# Patient Record
Sex: FEMALE | Race: WHITE | Employment: OTHER | URBAN - METROPOLITAN AREA
[De-identification: names, ages, dates, MRNs, and addresses within clinical notes are randomized per-mention and may not be internally consistent; named-entity substitution may affect disease eponyms.]

---

## 2017-02-21 ENCOUNTER — OFFICE VISIT (OUTPATIENT)
Dept: FAMILY MEDICINE CLINIC | Age: 62
End: 2017-02-21

## 2017-02-21 VITALS
BODY MASS INDEX: 24.29 KG/M2 | OXYGEN SATURATION: 99 % | RESPIRATION RATE: 19 BRPM | HEIGHT: 63 IN | DIASTOLIC BLOOD PRESSURE: 68 MMHG | WEIGHT: 137.1 LBS | SYSTOLIC BLOOD PRESSURE: 114 MMHG | HEART RATE: 79 BPM | TEMPERATURE: 99.1 F

## 2017-02-21 DIAGNOSIS — J20.9 ACUTE BRONCHITIS, UNSPECIFIED ORGANISM: Primary | ICD-10-CM

## 2017-02-21 DIAGNOSIS — J01.80 OTHER ACUTE SINUSITIS: ICD-10-CM

## 2017-02-21 LAB
INFLUENZA A ANTIBODY: NORMAL
INFLUENZA B ANTIBODY: NORMAL

## 2017-02-21 PROCEDURE — 99213 OFFICE O/P EST LOW 20 MIN: CPT | Performed by: FAMILY MEDICINE

## 2017-02-21 PROCEDURE — 87804 INFLUENZA ASSAY W/OPTIC: CPT | Performed by: FAMILY MEDICINE

## 2017-02-21 RX ORDER — DEXTROMETHORPHAN HYDROBROMIDE AND PROMETHAZINE HYDROCHLORIDE 15; 6.25 MG/5ML; MG/5ML
5 SYRUP ORAL 4 TIMES DAILY PRN
Qty: 150 BOTTLE | Refills: 0 | Status: SHIPPED | OUTPATIENT
Start: 2017-02-21 | End: 2017-02-28

## 2017-02-21 RX ORDER — CEFUROXIME AXETIL 250 MG/1
250 TABLET ORAL 2 TIMES DAILY
Qty: 20 TABLET | Refills: 0 | Status: SHIPPED | OUTPATIENT
Start: 2017-02-21 | End: 2017-03-03

## 2017-02-21 ASSESSMENT — ENCOUNTER SYMPTOMS
SHORTNESS OF BREATH: 0
ABDOMINAL PAIN: 0

## 2017-03-07 ENCOUNTER — OFFICE VISIT (OUTPATIENT)
Dept: FAMILY MEDICINE CLINIC | Age: 62
End: 2017-03-07

## 2017-03-07 VITALS
SYSTOLIC BLOOD PRESSURE: 128 MMHG | DIASTOLIC BLOOD PRESSURE: 64 MMHG | TEMPERATURE: 98.8 F | BODY MASS INDEX: 24.27 KG/M2 | OXYGEN SATURATION: 99 % | HEART RATE: 79 BPM | WEIGHT: 137 LBS | HEIGHT: 63 IN | RESPIRATION RATE: 19 BRPM

## 2017-03-07 DIAGNOSIS — J32.9 RECURRENT SINUSITIS: Primary | ICD-10-CM

## 2017-03-07 DIAGNOSIS — J30.9 ALLERGIC RHINITIS, UNSPECIFIED ALLERGIC RHINITIS TRIGGER, UNSPECIFIED RHINITIS SEASONALITY: ICD-10-CM

## 2017-03-07 PROCEDURE — 99213 OFFICE O/P EST LOW 20 MIN: CPT | Performed by: FAMILY MEDICINE

## 2017-03-07 RX ORDER — AMOXICILLIN AND CLAVULANATE POTASSIUM 875; 125 MG/1; MG/1
1 TABLET, FILM COATED ORAL 2 TIMES DAILY
Qty: 20 TABLET | Refills: 0 | Status: SHIPPED | OUTPATIENT
Start: 2017-03-07 | End: 2017-03-17

## 2017-03-07 ASSESSMENT — ENCOUNTER SYMPTOMS
ABDOMINAL PAIN: 0
SHORTNESS OF BREATH: 0

## 2017-04-05 ENCOUNTER — OFFICE VISIT (OUTPATIENT)
Dept: OBGYN | Age: 62
End: 2017-04-05

## 2017-04-05 VITALS
DIASTOLIC BLOOD PRESSURE: 80 MMHG | WEIGHT: 137 LBS | SYSTOLIC BLOOD PRESSURE: 108 MMHG | BODY MASS INDEX: 24.27 KG/M2 | HEIGHT: 63 IN

## 2017-04-05 DIAGNOSIS — N81.6 RECTOCELE: Primary | ICD-10-CM

## 2017-04-05 PROCEDURE — 99203 OFFICE O/P NEW LOW 30 MIN: CPT | Performed by: OBSTETRICS & GYNECOLOGY

## 2017-04-25 ENCOUNTER — OFFICE VISIT (OUTPATIENT)
Dept: FAMILY MEDICINE CLINIC | Age: 62
End: 2017-04-25

## 2017-04-25 VITALS
HEART RATE: 69 BPM | TEMPERATURE: 97.1 F | SYSTOLIC BLOOD PRESSURE: 118 MMHG | OXYGEN SATURATION: 99 % | WEIGHT: 137.8 LBS | BODY MASS INDEX: 24.41 KG/M2 | HEIGHT: 63 IN | RESPIRATION RATE: 14 BRPM | DIASTOLIC BLOOD PRESSURE: 68 MMHG

## 2017-04-25 DIAGNOSIS — R09.81 SINUS CONGESTION: ICD-10-CM

## 2017-04-25 DIAGNOSIS — R05.9 COUGH: ICD-10-CM

## 2017-04-25 DIAGNOSIS — R09.82 PND (POST-NASAL DRIP): ICD-10-CM

## 2017-04-25 DIAGNOSIS — J01.00 ACUTE NON-RECURRENT MAXILLARY SINUSITIS: Primary | ICD-10-CM

## 2017-04-25 PROCEDURE — 99213 OFFICE O/P EST LOW 20 MIN: CPT | Performed by: PHYSICIAN ASSISTANT

## 2017-04-25 RX ORDER — MOMETASONE FUROATE 50 UG/1
2 SPRAY, METERED NASAL DAILY
Status: ON HOLD | COMMUNITY
End: 2021-03-03

## 2017-04-25 RX ORDER — AMOXICILLIN AND CLAVULANATE POTASSIUM 875; 125 MG/1; MG/1
1 TABLET, FILM COATED ORAL EVERY 12 HOURS
Qty: 20 TABLET | Refills: 0 | Status: SHIPPED | OUTPATIENT
Start: 2017-04-25 | End: 2017-05-05

## 2017-04-25 ASSESSMENT — ENCOUNTER SYMPTOMS
CHEST TIGHTNESS: 0
SHORTNESS OF BREATH: 0
RHINORRHEA: 1
SINUS COMPLAINT: 1
COUGH: 1
SINUS PRESSURE: 1
COLOR CHANGE: 0

## 2017-05-10 ENCOUNTER — EMPLOYEE WELLNESS (OUTPATIENT)
Dept: OTHER | Age: 62
End: 2017-05-10

## 2017-05-10 LAB
CHOLESTEROL, TOTAL: 232 MG/DL (ref 0–199)
GLUCOSE BLD-MCNC: 78 MG/DL (ref 74–109)
HDLC SERPL-MCNC: 77 MG/DL (ref 40–59)
LDL CHOLESTEROL CALCULATED: 141 MG/DL (ref 0–129)
TRIGL SERPL-MCNC: 68 MG/DL (ref 0–200)

## 2017-06-27 ENCOUNTER — OFFICE VISIT (OUTPATIENT)
Dept: FAMILY MEDICINE CLINIC | Age: 62
End: 2017-06-27

## 2017-06-27 VITALS
WEIGHT: 138 LBS | HEART RATE: 50 BPM | DIASTOLIC BLOOD PRESSURE: 60 MMHG | BODY MASS INDEX: 24.45 KG/M2 | OXYGEN SATURATION: 98 % | TEMPERATURE: 97.2 F | SYSTOLIC BLOOD PRESSURE: 112 MMHG | RESPIRATION RATE: 12 BRPM | HEIGHT: 63 IN

## 2017-06-27 DIAGNOSIS — J45.20 MILD INTERMITTENT ASTHMA WITHOUT COMPLICATION: ICD-10-CM

## 2017-06-27 DIAGNOSIS — M77.42 METATARSALGIA OF LEFT FOOT: Primary | ICD-10-CM

## 2017-06-27 PROCEDURE — 99213 OFFICE O/P EST LOW 20 MIN: CPT | Performed by: FAMILY MEDICINE

## 2017-06-27 RX ORDER — ALBUTEROL SULFATE 90 UG/1
2 AEROSOL, METERED RESPIRATORY (INHALATION) EVERY 6 HOURS PRN
Qty: 1 INHALER | Refills: 3 | Status: SHIPPED | OUTPATIENT
Start: 2017-06-27 | End: 2019-06-10 | Stop reason: SDUPTHER

## 2017-06-27 ASSESSMENT — ENCOUNTER SYMPTOMS
RESPIRATORY NEGATIVE: 1
VOMITING: 0
SORE THROAT: 0
CHANGE IN BOWEL HABIT: 0
ABDOMINAL PAIN: 0
GASTROINTESTINAL NEGATIVE: 1
COUGH: 0
SWOLLEN GLANDS: 0
NAUSEA: 0
VISUAL CHANGE: 0

## 2017-07-05 ENCOUNTER — OFFICE VISIT (OUTPATIENT)
Dept: PODIATRY | Age: 62
End: 2017-07-05

## 2017-07-05 VITALS
HEIGHT: 63 IN | RESPIRATION RATE: 16 BRPM | BODY MASS INDEX: 24.8 KG/M2 | OXYGEN SATURATION: 99 % | DIASTOLIC BLOOD PRESSURE: 58 MMHG | WEIGHT: 140 LBS | SYSTOLIC BLOOD PRESSURE: 104 MMHG | HEART RATE: 58 BPM

## 2017-07-05 DIAGNOSIS — M77.40 METATARSALGIA, UNSPECIFIED LATERALITY: Primary | ICD-10-CM

## 2017-07-05 PROCEDURE — 99202 OFFICE O/P NEW SF 15 MIN: CPT | Performed by: PODIATRIST

## 2017-07-06 ENCOUNTER — OFFICE VISIT (OUTPATIENT)
Dept: OBGYN | Age: 62
End: 2017-07-06

## 2017-07-06 VITALS
BODY MASS INDEX: 24.27 KG/M2 | DIASTOLIC BLOOD PRESSURE: 86 MMHG | HEIGHT: 63 IN | SYSTOLIC BLOOD PRESSURE: 120 MMHG | WEIGHT: 137 LBS

## 2017-07-06 DIAGNOSIS — Z46.89 PESSARY MAINTENANCE: ICD-10-CM

## 2017-07-06 DIAGNOSIS — N81.6 RECTOCELE: Primary | ICD-10-CM

## 2017-07-06 PROCEDURE — 99213 OFFICE O/P EST LOW 20 MIN: CPT | Performed by: OBSTETRICS & GYNECOLOGY

## 2017-09-11 RX ORDER — FLUCONAZOLE 150 MG/1
TABLET ORAL
Qty: 3 TABLET | Refills: 1 | Status: SHIPPED | OUTPATIENT
Start: 2017-09-11 | End: 2017-11-07 | Stop reason: ALTCHOICE

## 2017-11-07 ENCOUNTER — OFFICE VISIT (OUTPATIENT)
Dept: FAMILY MEDICINE CLINIC | Age: 62
End: 2017-11-07

## 2017-11-07 ENCOUNTER — TELEPHONE (OUTPATIENT)
Dept: FAMILY MEDICINE CLINIC | Age: 62
End: 2017-11-07

## 2017-11-07 VITALS
TEMPERATURE: 98.7 F | DIASTOLIC BLOOD PRESSURE: 68 MMHG | HEIGHT: 63 IN | OXYGEN SATURATION: 98 % | BODY MASS INDEX: 25.16 KG/M2 | WEIGHT: 142 LBS | RESPIRATION RATE: 14 BRPM | SYSTOLIC BLOOD PRESSURE: 110 MMHG | HEART RATE: 76 BPM

## 2017-11-07 DIAGNOSIS — J01.90 ACUTE BACTERIAL SINUSITIS: ICD-10-CM

## 2017-11-07 DIAGNOSIS — B96.89 ACUTE BACTERIAL SINUSITIS: ICD-10-CM

## 2017-11-07 DIAGNOSIS — J40 BRONCHITIS: Primary | ICD-10-CM

## 2017-11-07 RX ORDER — PROMETHAZINE HYDROCHLORIDE AND CODEINE PHOSPHATE 6.25; 1 MG/5ML; MG/5ML
5 SYRUP ORAL EVERY 4 HOURS PRN
Qty: 180 ML | Refills: 0 | Status: SHIPPED | OUTPATIENT
Start: 2017-11-07 | End: 2019-06-10 | Stop reason: SDUPTHER

## 2017-11-07 RX ORDER — AMOXICILLIN AND CLAVULANATE POTASSIUM 875; 125 MG/1; MG/1
1 TABLET, FILM COATED ORAL 2 TIMES DAILY
Qty: 20 TABLET | Refills: 0 | Status: SHIPPED | OUTPATIENT
Start: 2017-11-07 | End: 2018-03-26 | Stop reason: SDUPTHER

## 2017-11-07 RX ORDER — BENZONATATE 100 MG/1
CAPSULE ORAL
Refills: 0 | COMMUNITY
Start: 2017-11-05 | End: 2017-11-07 | Stop reason: SDUPTHER

## 2017-11-07 RX ORDER — DOXYCYCLINE HYCLATE 100 MG/1
100 CAPSULE ORAL
COMMUNITY
Start: 2017-11-05 | End: 2017-11-13

## 2017-11-07 RX ORDER — BENZONATATE 100 MG/1
CAPSULE ORAL
Qty: 40 CAPSULE | Refills: 0 | Status: SHIPPED | OUTPATIENT
Start: 2017-11-07 | End: 2018-03-26 | Stop reason: ALTCHOICE

## 2017-11-07 RX ORDER — PREDNISONE 10 MG/1
TABLET ORAL
Qty: 30 TABLET | Refills: 0 | Status: SHIPPED | OUTPATIENT
Start: 2017-11-07 | End: 2018-03-26 | Stop reason: ALTCHOICE

## 2017-11-07 RX ORDER — BENZONATATE 100 MG/1
CAPSULE ORAL
COMMUNITY
Start: 2017-11-05 | End: 2017-11-07 | Stop reason: SDUPTHER

## 2017-11-07 ASSESSMENT — PATIENT HEALTH QUESTIONNAIRE - PHQ9
2. FEELING DOWN, DEPRESSED OR HOPELESS: 0
SUM OF ALL RESPONSES TO PHQ9 QUESTIONS 1 & 2: 0
SUM OF ALL RESPONSES TO PHQ QUESTIONS 1-9: 0
1. LITTLE INTEREST OR PLEASURE IN DOING THINGS: 0

## 2017-11-07 NOTE — TELEPHONE ENCOUNTER
States she was seen for a sinus infection at [address] [facility], was given antibiotics, she states shes still coughing and having problems with congestion. She states she really needs to see you because she has taking so many meds and nothing is still working. I let her know you are booked for the rest of the week and she states she does not want to see no once but you.  Is there anyway she can be squeezed in somewhere she wants to know

## 2017-11-12 ASSESSMENT — ENCOUNTER SYMPTOMS
SNORING: 0
NAUSEA: 0
HOARSE VOICE: 0
COUGH: 1
RHINORRHEA: 0
SORE THROAT: 0
SHORTNESS OF BREATH: 0
SWOLLEN GLANDS: 1
VOMITING: 0
WHEEZING: 1

## 2017-11-12 NOTE — PROGRESS NOTES
Subjective  Lara Bigness, 65 y.o. female presents today with:  Chief Complaint   Patient presents with    Check-Up     sinus infection       The history is provided by the patient. Sinusitis   Pain details:     Location:  Frontal and maxillary    Quality:  Pressure    Severity:  Moderate    Duration:  2 weeks    Timing:  Constant  Progression:  Waxing and waning  Context: not allergies, not chemical odor, not deviated nasal septum, not recent URI and not smoke inhalation    Relieved by:  Nothing  Worsened by:  Nothing  Ineffective treatments:  Antibiotics and oral decongestants  Associated symptoms: congestion, cough, ear pain, headaches, swollen glands and wheezing    Associated symptoms: no chest pain, no chills, no fatigue, no fever, no hoarse voice, no mouth breathing, no nausea, no rhinorrhea, no shortness of breath, no sneezing, no snoring, no sore throat, no tooth pain, no vertigo and no vomiting    Risk factors: no allergic reaction, no asthma, no BiPAP, no COPD, no CPAP use, no cystic fibrosis, no diabetes, no immune deficiency, no nasal cannula, no nasal polyps and no smoke exposure          Review of Systems   Constitutional: Negative for chills, fatigue and fever. HENT: Positive for congestion and ear pain. Negative for hoarse voice, rhinorrhea, sneezing and sore throat. Respiratory: Positive for cough and wheezing. Negative for snoring and shortness of breath. Cardiovascular: Negative for chest pain. Gastrointestinal: Negative for nausea and vomiting. Neurological: Positive for headaches. Negative for vertigo. Past Medical History:   Diagnosis Date    Asthma     GERD (gastroesophageal reflux disease)     Osteopenia      Past Surgical History:   Procedure Laterality Date    HYSTERECTOMY  2004    total    KNEE ARTHROSCOPY  age 22    POLYPECTOMY  2010    colon, stomach    TONSILLECTOMY      child    UPPER GASTROINTESTINAL ENDOSCOPY  11/5/15     DR. Ryan Pacheco      Social History tablet Take 1 tablet by mouth daily.  Ascorbic Acid (VITAMIN C) 250 MG tablet Take 250 mg by mouth daily. No current facility-administered medications for this visit. PMH, Surgical Hx, Family Hx, and Social Hx reviewed and updated. Health Maintenance reviewed. Objective    Vitals:    11/07/17 1411   BP: 110/68   Site: Left Arm   Position: Sitting   Cuff Size: Medium Adult   Pulse: 76   Resp: 14   Temp: 98.7 °F (37.1 °C)   TempSrc: Tympanic   SpO2: 98%   Weight: 142 lb (64.4 kg)   Height: 5' 3\" (1.6 m)       Physical Exam   Constitutional: She is oriented to person, place, and time. She appears well-developed and well-nourished. No distress. HENT:   Head: Normocephalic. Right Ear: Tympanic membrane, external ear and ear canal normal.   Left Ear: Tympanic membrane, external ear and ear canal normal.   Nose: Mucosal edema, rhinorrhea and sinus tenderness present. Right sinus exhibits frontal sinus tenderness. Right sinus exhibits no maxillary sinus tenderness. Left sinus exhibits frontal sinus tenderness. Left sinus exhibits no maxillary sinus tenderness. Mouth/Throat: Uvula is midline and mucous membranes are normal. Posterior oropharyngeal erythema present. No oropharyngeal exudate. Eyes: Conjunctivae are normal. Pupils are equal, round, and reactive to light. Neck: Neck supple. Cardiovascular: Normal rate, regular rhythm and intact distal pulses. Pulmonary/Chest: Effort normal and breath sounds normal. No respiratory distress. She has no rales. Lymphadenopathy:     She has cervical adenopathy. Neurological: She is alert and oriented to person, place, and time. Skin: Skin is warm and dry. No rash noted. Psychiatric: She has a normal mood and affect. Assessment & Plan   Joe Tristan was seen today for check-up.     Diagnoses and all orders for this visit:    Bronchitis  -     benzonatate (TESSALON) 100 MG capsule; 1-2 capsules every 8 hours as needed  -

## 2017-11-13 ENCOUNTER — PATIENT MESSAGE (OUTPATIENT)
Dept: FAMILY MEDICINE CLINIC | Age: 62
End: 2017-11-13

## 2017-11-13 NOTE — TELEPHONE ENCOUNTER
From: Zaira Sutton  To: Marylene Pollen, NP  Sent: 11/12/2017 3:27 PM EST  Subject: RE:Thank you for your visit! Alvaro Darnell,   Yesterday the sinus headache and drainage started again and I am still feeling worn out. Chest cough has improved tons. Was I supposed to stop the Vibramycin while I was on the Augmentin? I did, but after starting to feel like the sinusitis was returning with frontal headaches, chills , and having yellow drainage down the back of my throat, I restarted the Vibramycin also. Hope that is ok. Feel like I did not get rid of the sinusitis and it is still infected up in frontal sinuses. Sandra Cruz  ----- Message -----  From: Julia Rivas NP  Sent: 11/12/2017 2:53 PM EST  To: Zaira Sutton  Subject: Thank you for your visit!  11/12/2017   Julia Rivas NP   [facility] PCP  [address] [address]  Dept: [phone]  Dept Fax: [phone]: [phone]     Dear Zena Myrick,  Thank you for your recent visit. We at [facility] are committed to providing amazing patient care, and would like to make sure we were successful in providing you a great experience at our office. In the coming days, you may receive a survey via mail or email about your experience with my office. We ask that you please take a couple of minutes to complete and return it. We use our patients feedback to make improvements within the office that will make the experience even better for all of our patients. Thank you, and be well!   Julia Rivas NP

## 2018-03-20 VITALS — BODY MASS INDEX: 24.62 KG/M2 | WEIGHT: 139 LBS

## 2018-03-26 ENCOUNTER — OFFICE VISIT (OUTPATIENT)
Dept: INTERNAL MEDICINE CLINIC | Age: 63
End: 2018-03-26
Payer: COMMERCIAL

## 2018-03-26 VITALS
RESPIRATION RATE: 18 BRPM | BODY MASS INDEX: 25.69 KG/M2 | DIASTOLIC BLOOD PRESSURE: 77 MMHG | OXYGEN SATURATION: 98 % | TEMPERATURE: 98.1 F | HEART RATE: 62 BPM | HEIGHT: 63 IN | WEIGHT: 145 LBS | SYSTOLIC BLOOD PRESSURE: 128 MMHG

## 2018-03-26 DIAGNOSIS — H93.11 TINNITUS OF RIGHT EAR: ICD-10-CM

## 2018-03-26 DIAGNOSIS — H66.90 ACUTE OTITIS MEDIA, UNSPECIFIED OTITIS MEDIA TYPE: Primary | ICD-10-CM

## 2018-03-26 DIAGNOSIS — T36.95XA ANTIBIOTIC-INDUCED YEAST INFECTION: ICD-10-CM

## 2018-03-26 DIAGNOSIS — B37.9 ANTIBIOTIC-INDUCED YEAST INFECTION: ICD-10-CM

## 2018-03-26 PROCEDURE — 99213 OFFICE O/P EST LOW 20 MIN: CPT | Performed by: NURSE PRACTITIONER

## 2018-03-26 RX ORDER — FLUCONAZOLE 150 MG/1
150 TABLET ORAL ONCE
Qty: 2 TABLET | Refills: 0 | Status: SHIPPED | OUTPATIENT
Start: 2018-03-26 | End: 2019-06-10 | Stop reason: SDUPTHER

## 2018-03-26 RX ORDER — AMOXICILLIN AND CLAVULANATE POTASSIUM 875; 125 MG/1; MG/1
1 TABLET, FILM COATED ORAL 2 TIMES DAILY
Qty: 20 TABLET | Refills: 0 | Status: SHIPPED | OUTPATIENT
Start: 2018-03-26 | End: 2018-04-05

## 2018-03-26 ASSESSMENT — ENCOUNTER SYMPTOMS
FACIAL SWELLING: 0
SINUS PRESSURE: 0
SORE THROAT: 0
WHEEZING: 0
SHORTNESS OF BREATH: 0
COUGH: 0

## 2018-03-26 NOTE — PROGRESS NOTES
name: N/A    Number of children: N/A    Years of education: N/A     Occupational History    Not on file. Social History Main Topics    Smoking status: Never Smoker    Smokeless tobacco: Never Used    Alcohol use Yes      Comment: socially    Drug use: No    Sexual activity: Yes     Partners: Male     Other Topics Concern    Not on file     Social History Narrative    No narrative on file     Allergies:  Sulfamethoxazole-trimethoprim and Sulfamethoxazole-trimethoprim    Review of Systems   Constitutional: Negative for chills, diaphoresis and fever. HENT: Positive for postnasal drip. Negative for congestion, ear pain, facial swelling, sinus pressure and sore throat. Respiratory: Negative for cough, shortness of breath and wheezing. Cardiovascular: Negative for chest pain and palpitations. Neurological: Positive for dizziness (reports very intermittent and only lasts a moment). Negative for syncope, weakness, light-headedness and headaches. Objective:   /77 (Site: Right Arm, Position: Sitting, Cuff Size: Large Adult)   Pulse 62   Temp 98.1 °F (36.7 °C) (Temporal)   Resp 18   Ht 5' 3\" (1.6 m)   Wt 145 lb (65.8 kg)   SpO2 98%   Breastfeeding? No   BMI 25.69 kg/m²     Physical Exam   Constitutional: She is oriented to person, place, and time. She appears well-developed and well-nourished. No distress. HENT:   Head: Normocephalic and atraumatic. Right Ear: Hearing and ear canal normal. No tenderness. Tympanic membrane is injected. A middle ear effusion is present. Left Ear: External ear and ear canal normal. A middle ear effusion is present. Nose: Nose normal.   Mouth/Throat: Uvula is midline, oropharynx is clear and moist and mucous membranes are normal.   Eyes: Conjunctivae are normal. Pupils are equal, round, and reactive to light. Neck: Neck supple. Cardiovascular: Normal rate, regular rhythm and normal heart sounds.     Pulmonary/Chest: Effort normal and breath

## 2018-06-28 ENCOUNTER — OFFICE VISIT (OUTPATIENT)
Dept: INTERNAL MEDICINE CLINIC | Age: 63
End: 2018-06-28
Payer: COMMERCIAL

## 2018-06-28 VITALS
BODY MASS INDEX: 25.48 KG/M2 | HEIGHT: 63 IN | RESPIRATION RATE: 16 BRPM | TEMPERATURE: 97.6 F | DIASTOLIC BLOOD PRESSURE: 72 MMHG | WEIGHT: 143.8 LBS | OXYGEN SATURATION: 98 % | HEART RATE: 67 BPM | SYSTOLIC BLOOD PRESSURE: 124 MMHG

## 2018-06-28 DIAGNOSIS — H69.83 DYSFUNCTION OF BOTH EUSTACHIAN TUBES: ICD-10-CM

## 2018-06-28 DIAGNOSIS — Z00.00 PHYSICAL EXAM, ANNUAL: Primary | ICD-10-CM

## 2018-06-28 PROCEDURE — 99396 PREV VISIT EST AGE 40-64: CPT | Performed by: FAMILY MEDICINE

## 2018-06-28 ASSESSMENT — ENCOUNTER SYMPTOMS
EYES NEGATIVE: 1
CHEST TIGHTNESS: 0
GASTROINTESTINAL NEGATIVE: 1
RESPIRATORY NEGATIVE: 1
RHINORRHEA: 0
COUGH: 0

## 2018-06-28 NOTE — PROGRESS NOTES
108 (90 Base) MCG/ACT inhaler Inhale 2 puffs into the lungs every 6 hours as needed for Wheezing 1 Inhaler 3    mometasone (NASONEX) 50 MCG/ACT nasal spray 2 sprays by Nasal route daily      Vitamin D (CHOLECALCIFEROL) 1000 UNITS CAPS capsule Take 2 capsules by mouth daily. 30 capsule     calcium carbonate 600 MG TABS tablet Take 1 tablet by mouth daily.  Ascorbic Acid (VITAMIN C) 250 MG tablet Take 250 mg by mouth daily. No current facility-administered medications for this visit. Current Outpatient Prescriptions on File Prior to Visit   Medication Sig Dispense Refill    estradiol (VIVELLE) 0.1 MG/24HR 1 patch bid 24 patch 3    albuterol sulfate  (90 Base) MCG/ACT inhaler Inhale 2 puffs into the lungs every 6 hours as needed for Wheezing 1 Inhaler 3    mometasone (NASONEX) 50 MCG/ACT nasal spray 2 sprays by Nasal route daily      Vitamin D (CHOLECALCIFEROL) 1000 UNITS CAPS capsule Take 2 capsules by mouth daily. 30 capsule     calcium carbonate 600 MG TABS tablet Take 1 tablet by mouth daily.  Ascorbic Acid (VITAMIN C) 250 MG tablet Take 250 mg by mouth daily. No current facility-administered medications on file prior to visit.       Allergies   Allergen Reactions    Sulfamethoxazole-Trimethoprim Itching and Rash    Sulfamethoxazole-Trimethoprim Itching and Rash     body rash     Health Maintenance   Topic Date Due    Pneumococcal med risk (1 of 1 - PPSV23) 09/28/2018 (Originally 5/4/1975)    Shingles Vaccine (1 of 2 - 2 Dose Series) 10/28/2018 (Originally 5/4/2006)    Breast cancer screen  12/01/2018    Cervical cancer screen  05/27/2019    Lipid screen  05/10/2022    DTaP/Tdap/Td vaccine (2 - Td) 08/13/2023    Colon cancer screen colonoscopy  11/05/2025    Flu vaccine  Completed    Hepatitis C screen  Addressed    HIV screen  Addressed       Review of Systems    Review of Systems   Constitutional: Negative for activity change, appetite change, fatigue and Panel     Standing Status:   Future     Standing Expiration Date:   6/28/2019     Order Specific Question:   Is Patient Fasting?/# of Hours     Answer:   8     Will try flonase twice a day  No Follow-up on file.   Dolores Amos MD

## 2018-06-29 ENCOUNTER — TELEPHONE (OUTPATIENT)
Dept: INTERNAL MEDICINE CLINIC | Age: 63
End: 2018-06-29

## 2018-06-29 DIAGNOSIS — Z00.00 PHYSICAL EXAM, ANNUAL: ICD-10-CM

## 2018-06-29 LAB
ALBUMIN SERPL-MCNC: 4.1 G/DL (ref 3.9–4.9)
ALP BLD-CCNC: 38 U/L (ref 40–130)
ALT SERPL-CCNC: 12 U/L (ref 0–33)
ANION GAP SERPL CALCULATED.3IONS-SCNC: 10 MEQ/L (ref 7–13)
AST SERPL-CCNC: 15 U/L (ref 0–35)
BASOPHILS ABSOLUTE: 0 K/UL (ref 0–0.2)
BASOPHILS RELATIVE PERCENT: 0.7 %
BILIRUB SERPL-MCNC: 0.5 MG/DL (ref 0–1.2)
BUN BLDV-MCNC: 13 MG/DL (ref 8–23)
CALCIUM SERPL-MCNC: 8.7 MG/DL (ref 8.6–10.2)
CHLORIDE BLD-SCNC: 100 MEQ/L (ref 98–107)
CHOLESTEROL, TOTAL: 237 MG/DL (ref 0–199)
CO2: 30 MEQ/L (ref 22–29)
CREAT SERPL-MCNC: 0.53 MG/DL (ref 0.5–0.9)
EOSINOPHILS ABSOLUTE: 0.1 K/UL (ref 0–0.7)
EOSINOPHILS RELATIVE PERCENT: 3 %
GFR AFRICAN AMERICAN: >60
GFR NON-AFRICAN AMERICAN: >60
GLOBULIN: 2.5 G/DL (ref 2.3–3.5)
GLUCOSE BLD-MCNC: 84 MG/DL (ref 74–109)
HCT VFR BLD CALC: 40.5 % (ref 37–47)
HDLC SERPL-MCNC: 81 MG/DL (ref 40–59)
HEMOGLOBIN: 13.8 G/DL (ref 12–16)
LDL CHOLESTEROL CALCULATED: 145 MG/DL (ref 0–129)
LYMPHOCYTES ABSOLUTE: 1.6 K/UL (ref 1–4.8)
LYMPHOCYTES RELATIVE PERCENT: 33.8 %
MCH RBC QN AUTO: 32.1 PG (ref 27–31.3)
MCHC RBC AUTO-ENTMCNC: 34.2 % (ref 33–37)
MCV RBC AUTO: 94 FL (ref 82–100)
MONOCYTES ABSOLUTE: 0.4 K/UL (ref 0.2–0.8)
MONOCYTES RELATIVE PERCENT: 9.4 %
NEUTROPHILS ABSOLUTE: 2.5 K/UL (ref 1.4–6.5)
NEUTROPHILS RELATIVE PERCENT: 53.1 %
PDW BLD-RTO: 13.5 % (ref 11.5–14.5)
PLATELET # BLD: 220 K/UL (ref 130–400)
POTASSIUM SERPL-SCNC: 4.2 MEQ/L (ref 3.5–5.1)
RBC # BLD: 4.31 M/UL (ref 4.2–5.4)
SODIUM BLD-SCNC: 140 MEQ/L (ref 132–144)
TOTAL PROTEIN: 6.6 G/DL (ref 6.4–8.1)
TRIGL SERPL-MCNC: 56 MG/DL (ref 0–200)
WBC # BLD: 4.7 K/UL (ref 4.8–10.8)

## 2018-07-03 ENCOUNTER — OFFICE VISIT (OUTPATIENT)
Dept: SURGERY | Age: 63
End: 2018-07-03
Payer: COMMERCIAL

## 2018-07-03 VITALS
HEIGHT: 63 IN | BODY MASS INDEX: 25.52 KG/M2 | SYSTOLIC BLOOD PRESSURE: 124 MMHG | WEIGHT: 144 LBS | TEMPERATURE: 97.6 F | DIASTOLIC BLOOD PRESSURE: 78 MMHG

## 2018-07-03 DIAGNOSIS — N81.6 RECTOCELE: Primary | ICD-10-CM

## 2018-07-03 PROCEDURE — 99204 OFFICE O/P NEW MOD 45 MIN: CPT | Performed by: COLON & RECTAL SURGERY

## 2018-07-03 ASSESSMENT — ENCOUNTER SYMPTOMS
ABDOMINAL PAIN: 0
RECTAL PAIN: 0
ABDOMINAL DISTENTION: 0
CHEST TIGHTNESS: 0
VOMITING: 0
CONSTIPATION: 0
SHORTNESS OF BREATH: 0
BLOOD IN STOOL: 0
WHEEZING: 0
VOICE CHANGE: 0

## 2018-07-03 NOTE — PROGRESS NOTES
Subjective:      Patient ID: Delfino Wright is a 59 y.o. female who presents for:  Chief Complaint   Patient presents with    New Patient     rectocele had a few years uncomfortable hard to move marla        This is a 73-year-old female who's had a long-standing problem with rectocele with obstructive defecation. Her last colonoscopy was 2015 with some benign polyps removed. Patient denies fecal incontinence or smearing. Patient requires posterior vaginal wall manipulation during defecation. She denies any abdominal issues other than a total abdominal hysterectomy in the past.    She reports that she eats well with a good fiber intake. She doesn't drink a lot of water during the day. She denies rectal bleeding. She denies any abdominal pain or recent weight loss. Past Medical History:   Diagnosis Date    Asthma     GERD (gastroesophageal reflux disease)     Osteopenia      Past Surgical History:   Procedure Laterality Date    HYSTERECTOMY  2004    total    KNEE ARTHROSCOPY  age 22    POLYPECTOMY  2010    colon, stomach    TONSILLECTOMY      child    UPPER GASTROINTESTINAL ENDOSCOPY  11/5/15     DR. LOVING      Social History     Social History    Marital status:      Spouse name: N/A    Number of children: N/A    Years of education: N/A     Occupational History    Not on file.      Social History Main Topics    Smoking status: Never Smoker    Smokeless tobacco: Never Used    Alcohol use Yes      Comment: socially    Drug use: No    Sexual activity: Yes     Partners: Male     Other Topics Concern    Not on file     Social History Narrative    No narrative on file     Family History   Problem Relation Age of Onset    Arthritis Mother         RA    Asthma Mother     Kidney Disease Mother     Arthritis Father         RA    Cancer Father         prostate    Heart Disease Father     High Blood Pressure Father     High Cholesterol Father     Stroke Father     Other

## 2018-07-06 ENCOUNTER — TELEPHONE (OUTPATIENT)
Dept: INTERNAL MEDICINE CLINIC | Age: 63
End: 2018-07-06

## 2018-07-06 NOTE — TELEPHONE ENCOUNTER
Patient needs [facility] form completed. It is done and awaiting her signature, then it can be faxed. Left message for patient to call back.

## 2018-07-24 ENCOUNTER — HOSPITAL ENCOUNTER (OUTPATIENT)
Dept: WOMENS IMAGING | Age: 63
Discharge: HOME | End: 2018-07-26
Payer: COMMERCIAL

## 2018-07-24 DIAGNOSIS — Z01.419 WELL WOMAN EXAM WITH ROUTINE GYNECOLOGICAL EXAM: ICD-10-CM

## 2018-07-24 DIAGNOSIS — Z12.31 SCREENING MAMMOGRAM, ENCOUNTER FOR: ICD-10-CM

## 2018-07-24 PROCEDURE — 77080 DXA BONE DENSITY AXIAL: CPT

## 2018-07-24 PROCEDURE — 77063 BREAST TOMOSYNTHESIS BI: CPT

## 2018-07-24 NOTE — TELEPHONE ENCOUNTER
Spoke with patient today, she requests I fax it to her tomorrow morning to [phone]. She will then forward it to [facility].

## 2018-09-14 ENCOUNTER — OFFICE VISIT (OUTPATIENT)
Dept: SURGERY | Age: 63
End: 2018-09-14
Payer: COMMERCIAL

## 2018-09-14 VITALS — HEIGHT: 63 IN | BODY MASS INDEX: 25.69 KG/M2 | WEIGHT: 145 LBS | TEMPERATURE: 98.3 F

## 2018-09-14 DIAGNOSIS — N81.6 RECTOCELE: Primary | ICD-10-CM

## 2018-09-14 PROCEDURE — 99214 OFFICE O/P EST MOD 30 MIN: CPT | Performed by: COLON & RECTAL SURGERY

## 2018-09-14 ASSESSMENT — ENCOUNTER SYMPTOMS
ANAL BLEEDING: 0
DIARRHEA: 0
WHEEZING: 0
VOMITING: 0
APNEA: 0
ABDOMINAL DISTENTION: 0
BACK PAIN: 0
ABDOMINAL PAIN: 0
CONSTIPATION: 0
RECTAL PAIN: 0
NAUSEA: 0
SHORTNESS OF BREATH: 0
COLOR CHANGE: 0
BLOOD IN STOOL: 0
CHEST TIGHTNESS: 0

## 2018-09-17 RX ORDER — FLUCONAZOLE 150 MG/1
TABLET ORAL
Qty: 3 TABLET | Refills: 3 | Status: SHIPPED | OUTPATIENT
Start: 2018-09-17 | End: 2018-09-17 | Stop reason: SDUPTHER

## 2018-09-17 RX ORDER — FLUCONAZOLE 150 MG/1
TABLET ORAL
Qty: 3 TABLET | Refills: 3 | Status: SHIPPED | OUTPATIENT
Start: 2018-09-17 | End: 2018-11-02

## 2018-09-24 ENCOUNTER — ANESTHESIA EVENT (OUTPATIENT)
Dept: OPERATING ROOM | Age: 63
End: 2018-09-24
Payer: COMMERCIAL

## 2018-09-26 ENCOUNTER — HOSPITAL ENCOUNTER (OUTPATIENT)
Dept: PREADMISSION TESTING | Age: 63
Discharge: HOME | End: 2018-09-30
Payer: COMMERCIAL

## 2018-09-26 VITALS
HEART RATE: 71 BPM | RESPIRATION RATE: 16 BRPM | DIASTOLIC BLOOD PRESSURE: 68 MMHG | WEIGHT: 141 LBS | TEMPERATURE: 97.8 F | BODY MASS INDEX: 24.98 KG/M2 | SYSTOLIC BLOOD PRESSURE: 121 MMHG | HEIGHT: 63 IN | OXYGEN SATURATION: 100 %

## 2018-09-26 LAB
ANION GAP SERPL CALCULATED.3IONS-SCNC: 9 MEQ/L (ref 7–13)
BUN BLDV-MCNC: 13 MG/DL (ref 8–23)
CALCIUM SERPL-MCNC: 9.2 MG/DL (ref 8.6–10.2)
CHLORIDE BLD-SCNC: 100 MEQ/L (ref 98–107)
CO2: 29 MEQ/L (ref 22–29)
CREAT SERPL-MCNC: 0.49 MG/DL (ref 0.5–0.9)
EKG ATRIAL RATE: 57 BPM
EKG P AXIS: 69 DEGREES
EKG P-R INTERVAL: 140 MS
EKG Q-T INTERVAL: 424 MS
EKG QRS DURATION: 84 MS
EKG QTC CALCULATION (BAZETT): 412 MS
EKG R AXIS: 63 DEGREES
EKG T AXIS: 71 DEGREES
EKG VENTRICULAR RATE: 57 BPM
GFR AFRICAN AMERICAN: >60
GFR NON-AFRICAN AMERICAN: >60
GLUCOSE BLD-MCNC: 86 MG/DL (ref 74–109)
HCT VFR BLD CALC: 42.5 % (ref 37–47)
HEMOGLOBIN: 14.4 G/DL (ref 12–16)
MCH RBC QN AUTO: 32 PG (ref 27–31.3)
MCHC RBC AUTO-ENTMCNC: 34 % (ref 33–37)
MCV RBC AUTO: 94 FL (ref 82–100)
PDW BLD-RTO: 13.3 % (ref 11.5–14.5)
PLATELET # BLD: 228 K/UL (ref 130–400)
POTASSIUM SERPL-SCNC: 3.9 MEQ/L (ref 3.5–5.1)
RBC # BLD: 4.52 M/UL (ref 4.2–5.4)
SODIUM BLD-SCNC: 138 MEQ/L (ref 132–144)
WBC # BLD: 6.9 K/UL (ref 4.8–10.8)

## 2018-09-26 PROCEDURE — 93005 ELECTROCARDIOGRAM TRACING: CPT

## 2018-09-26 PROCEDURE — 80048 BASIC METABOLIC PNL TOTAL CA: CPT

## 2018-09-26 PROCEDURE — 85027 COMPLETE CBC AUTOMATED: CPT

## 2018-09-26 RX ORDER — SODIUM CHLORIDE 0.9 % (FLUSH) 0.9 %
10 SYRINGE (ML) INJECTION EVERY 12 HOURS SCHEDULED
Status: CANCELLED | OUTPATIENT
Start: 2018-09-26

## 2018-09-26 RX ORDER — SODIUM CHLORIDE 0.9 % (FLUSH) 0.9 %
10 SYRINGE (ML) INJECTION PRN
Status: CANCELLED | OUTPATIENT
Start: 2018-09-26

## 2018-09-26 RX ORDER — SODIUM CHLORIDE, SODIUM LACTATE, POTASSIUM CHLORIDE, CALCIUM CHLORIDE 600; 310; 30; 20 MG/100ML; MG/100ML; MG/100ML; MG/100ML
INJECTION, SOLUTION INTRAVENOUS CONTINUOUS
Status: CANCELLED | OUTPATIENT
Start: 2018-09-26

## 2018-09-26 RX ORDER — LIDOCAINE HYDROCHLORIDE 10 MG/ML
1 INJECTION, SOLUTION EPIDURAL; INFILTRATION; INTRACAUDAL; PERINEURAL
Status: CANCELLED | OUTPATIENT
Start: 2018-09-26 | End: 2018-09-26

## 2018-09-28 PROCEDURE — 93010 ELECTROCARDIOGRAM REPORT: CPT | Performed by: INTERNAL MEDICINE

## 2018-10-03 ENCOUNTER — HOSPITAL ENCOUNTER (OUTPATIENT)
Age: 63
Setting detail: OBSERVATION
Discharge: HOME | End: 2018-10-04
Attending: COLON & RECTAL SURGERY | Admitting: COLON & RECTAL SURGERY
Payer: COMMERCIAL

## 2018-10-03 ENCOUNTER — ANESTHESIA (OUTPATIENT)
Dept: OPERATING ROOM | Age: 63
End: 2018-10-03
Payer: COMMERCIAL

## 2018-10-03 VITALS — TEMPERATURE: 95 F | DIASTOLIC BLOOD PRESSURE: 71 MMHG | OXYGEN SATURATION: 100 % | SYSTOLIC BLOOD PRESSURE: 123 MMHG

## 2018-10-03 DIAGNOSIS — K46.9 ENTEROCELE: Primary | ICD-10-CM

## 2018-10-03 PROCEDURE — 57268 REPAIR OF BOWEL BULGE: CPT | Performed by: COLON & RECTAL SURGERY

## 2018-10-03 PROCEDURE — 94664 DEMO&/EVAL PT USE INHALER: CPT

## 2018-10-03 PROCEDURE — 2500000003 HC RX 250 WO HCPCS: Performed by: COLON & RECTAL SURGERY

## 2018-10-03 PROCEDURE — 6360000002 HC RX W HCPCS: Performed by: NURSE ANESTHETIST, CERTIFIED REGISTERED

## 2018-10-03 PROCEDURE — 2580000003 HC RX 258: Performed by: COLON & RECTAL SURGERY

## 2018-10-03 PROCEDURE — 2500000003 HC RX 250 WO HCPCS: Performed by: NURSE ANESTHETIST, CERTIFIED REGISTERED

## 2018-10-03 PROCEDURE — 2580000003 HC RX 258: Performed by: NURSE PRACTITIONER

## 2018-10-03 PROCEDURE — 2709999900 HC NON-CHARGEABLE SUPPLY: Performed by: COLON & RECTAL SURGERY

## 2018-10-03 PROCEDURE — 7100000000 HC PACU RECOVERY - FIRST 15 MIN: Performed by: COLON & RECTAL SURGERY

## 2018-10-03 PROCEDURE — 6360000002 HC RX W HCPCS: Performed by: STUDENT IN AN ORGANIZED HEALTH CARE EDUCATION/TRAINING PROGRAM

## 2018-10-03 PROCEDURE — G0378 HOSPITAL OBSERVATION PER HR: HCPCS

## 2018-10-03 PROCEDURE — 2500000003 HC RX 250 WO HCPCS: Performed by: NURSE PRACTITIONER

## 2018-10-03 PROCEDURE — 3600000014 HC SURGERY LEVEL 4 ADDTL 15MIN: Performed by: COLON & RECTAL SURGERY

## 2018-10-03 PROCEDURE — 3700000000 HC ANESTHESIA ATTENDED CARE: Performed by: COLON & RECTAL SURGERY

## 2018-10-03 PROCEDURE — 6360000002 HC RX W HCPCS: Performed by: COLON & RECTAL SURGERY

## 2018-10-03 PROCEDURE — 7100000001 HC PACU RECOVERY - ADDTL 15 MIN: Performed by: COLON & RECTAL SURGERY

## 2018-10-03 PROCEDURE — 3600000004 HC SURGERY LEVEL 4 BASE: Performed by: COLON & RECTAL SURGERY

## 2018-10-03 PROCEDURE — 6370000000 HC RX 637 (ALT 250 FOR IP): Performed by: COLON & RECTAL SURGERY

## 2018-10-03 PROCEDURE — 6370000000 HC RX 637 (ALT 250 FOR IP): Performed by: SURGERY

## 2018-10-03 PROCEDURE — 49320 DIAG LAPARO SEPARATE PROC: CPT | Performed by: COLON & RECTAL SURGERY

## 2018-10-03 PROCEDURE — 3700000001 HC ADD 15 MINUTES (ANESTHESIA): Performed by: COLON & RECTAL SURGERY

## 2018-10-03 RX ORDER — SODIUM CHLORIDE 0.9 % (FLUSH) 0.9 %
10 SYRINGE (ML) INJECTION PRN
Status: DISCONTINUED | OUTPATIENT
Start: 2018-10-03 | End: 2018-10-04 | Stop reason: HOSPADM

## 2018-10-03 RX ORDER — OXYCODONE HYDROCHLORIDE 5 MG/1
10 TABLET ORAL EVERY 4 HOURS PRN
Status: DISCONTINUED | OUTPATIENT
Start: 2018-10-03 | End: 2018-10-04 | Stop reason: HOSPADM

## 2018-10-03 RX ORDER — SODIUM CHLORIDE 0.9 % (FLUSH) 0.9 %
10 SYRINGE (ML) INJECTION EVERY 12 HOURS SCHEDULED
Status: DISCONTINUED | OUTPATIENT
Start: 2018-10-03 | End: 2018-10-04 | Stop reason: HOSPADM

## 2018-10-03 RX ORDER — ROCURONIUM BROMIDE 10 MG/ML
INJECTION, SOLUTION INTRAVENOUS PRN
Status: DISCONTINUED | OUTPATIENT
Start: 2018-10-03 | End: 2018-10-03 | Stop reason: SDUPTHER

## 2018-10-03 RX ORDER — METOCLOPRAMIDE HYDROCHLORIDE 5 MG/ML
10 INJECTION INTRAMUSCULAR; INTRAVENOUS
Status: ACTIVE | OUTPATIENT
Start: 2018-10-03 | End: 2018-10-03

## 2018-10-03 RX ORDER — HYDROCODONE BITARTRATE AND ACETAMINOPHEN 5; 325 MG/1; MG/1
1 TABLET ORAL PRN
Status: ACTIVE | OUTPATIENT
Start: 2018-10-03 | End: 2018-10-03

## 2018-10-03 RX ORDER — DEXTROSE AND SODIUM CHLORIDE 5; .45 G/100ML; G/100ML
INJECTION, SOLUTION INTRAVENOUS CONTINUOUS
Status: DISCONTINUED | OUTPATIENT
Start: 2018-10-03 | End: 2018-10-04 | Stop reason: HOSPADM

## 2018-10-03 RX ORDER — MIDAZOLAM HYDROCHLORIDE 1 MG/ML
INJECTION INTRAMUSCULAR; INTRAVENOUS PRN
Status: DISCONTINUED | OUTPATIENT
Start: 2018-10-03 | End: 2018-10-03 | Stop reason: SDUPTHER

## 2018-10-03 RX ORDER — DIPHENHYDRAMINE HYDROCHLORIDE 50 MG/ML
12.5 INJECTION INTRAMUSCULAR; INTRAVENOUS
Status: ACTIVE | OUTPATIENT
Start: 2018-10-03 | End: 2018-10-03

## 2018-10-03 RX ORDER — FENTANYL CITRATE 50 UG/ML
50 INJECTION, SOLUTION INTRAMUSCULAR; INTRAVENOUS EVERY 10 MIN PRN
Status: DISCONTINUED | OUTPATIENT
Start: 2018-10-03 | End: 2018-10-04 | Stop reason: HOSPADM

## 2018-10-03 RX ORDER — MEPERIDINE HYDROCHLORIDE 25 MG/ML
12.5 INJECTION INTRAMUSCULAR; INTRAVENOUS; SUBCUTANEOUS EVERY 5 MIN PRN
Status: DISCONTINUED | OUTPATIENT
Start: 2018-10-03 | End: 2018-10-04 | Stop reason: HOSPADM

## 2018-10-03 RX ORDER — OXYCODONE HYDROCHLORIDE 5 MG/1
5 TABLET ORAL EVERY 4 HOURS PRN
Status: DISCONTINUED | OUTPATIENT
Start: 2018-10-03 | End: 2018-10-04 | Stop reason: HOSPADM

## 2018-10-03 RX ORDER — PROPOFOL 10 MG/ML
INJECTION, EMULSION INTRAVENOUS PRN
Status: DISCONTINUED | OUTPATIENT
Start: 2018-10-03 | End: 2018-10-03 | Stop reason: SDUPTHER

## 2018-10-03 RX ORDER — MAGNESIUM HYDROXIDE 1200 MG/15ML
LIQUID ORAL CONTINUOUS PRN
Status: COMPLETED | OUTPATIENT
Start: 2018-10-03 | End: 2018-10-03

## 2018-10-03 RX ORDER — FENTANYL CITRATE 50 UG/ML
INJECTION, SOLUTION INTRAMUSCULAR; INTRAVENOUS PRN
Status: DISCONTINUED | OUTPATIENT
Start: 2018-10-03 | End: 2018-10-03 | Stop reason: SDUPTHER

## 2018-10-03 RX ORDER — LIDOCAINE HYDROCHLORIDE 20 MG/ML
INJECTION, SOLUTION INFILTRATION; PERINEURAL PRN
Status: DISCONTINUED | OUTPATIENT
Start: 2018-10-03 | End: 2018-10-03 | Stop reason: SDUPTHER

## 2018-10-03 RX ORDER — DOCUSATE SODIUM 100 MG/1
100 CAPSULE, LIQUID FILLED ORAL DAILY
Status: DISCONTINUED | OUTPATIENT
Start: 2018-10-04 | End: 2018-10-04 | Stop reason: HOSPADM

## 2018-10-03 RX ORDER — ACETAMINOPHEN 325 MG/1
650 TABLET ORAL EVERY 4 HOURS PRN
Status: DISCONTINUED | OUTPATIENT
Start: 2018-10-03 | End: 2018-10-04 | Stop reason: HOSPADM

## 2018-10-03 RX ORDER — ALBUTEROL SULFATE 90 UG/1
2 AEROSOL, METERED RESPIRATORY (INHALATION) EVERY 6 HOURS PRN
Status: DISCONTINUED | OUTPATIENT
Start: 2018-10-03 | End: 2018-10-04 | Stop reason: HOSPADM

## 2018-10-03 RX ORDER — ONDANSETRON 2 MG/ML
4 INJECTION INTRAMUSCULAR; INTRAVENOUS
Status: ACTIVE | OUTPATIENT
Start: 2018-10-03 | End: 2018-10-03

## 2018-10-03 RX ORDER — BUPIVACAINE HYDROCHLORIDE AND EPINEPHRINE 5; 5 MG/ML; UG/ML
INJECTION, SOLUTION EPIDURAL; INTRACAUDAL; PERINEURAL PRN
Status: DISCONTINUED | OUTPATIENT
Start: 2018-10-03 | End: 2018-10-03 | Stop reason: HOSPADM

## 2018-10-03 RX ORDER — HYDROCODONE BITARTRATE AND ACETAMINOPHEN 5; 325 MG/1; MG/1
2 TABLET ORAL PRN
Status: ACTIVE | OUTPATIENT
Start: 2018-10-03 | End: 2018-10-03

## 2018-10-03 RX ORDER — MAGNESIUM HYDROXIDE/ALUMINUM HYDROXICE/SIMETHICONE 120; 1200; 1200 MG/30ML; MG/30ML; MG/30ML
30 SUSPENSION ORAL EVERY 6 HOURS PRN
Status: DISCONTINUED | OUTPATIENT
Start: 2018-10-03 | End: 2018-10-04 | Stop reason: HOSPADM

## 2018-10-03 RX ORDER — KETOROLAC TROMETHAMINE 30 MG/ML
INJECTION, SOLUTION INTRAMUSCULAR; INTRAVENOUS PRN
Status: DISCONTINUED | OUTPATIENT
Start: 2018-10-03 | End: 2018-10-03 | Stop reason: SDUPTHER

## 2018-10-03 RX ORDER — DEXAMETHASONE SODIUM PHOSPHATE 4 MG/ML
INJECTION, SOLUTION INTRA-ARTICULAR; INTRALESIONAL; INTRAMUSCULAR; INTRAVENOUS; SOFT TISSUE PRN
Status: DISCONTINUED | OUTPATIENT
Start: 2018-10-03 | End: 2018-10-03 | Stop reason: SDUPTHER

## 2018-10-03 RX ORDER — ONDANSETRON 2 MG/ML
INJECTION INTRAMUSCULAR; INTRAVENOUS PRN
Status: DISCONTINUED | OUTPATIENT
Start: 2018-10-03 | End: 2018-10-03 | Stop reason: SDUPTHER

## 2018-10-03 RX ORDER — ONDANSETRON 2 MG/ML
4 INJECTION INTRAMUSCULAR; INTRAVENOUS EVERY 6 HOURS PRN
Status: DISCONTINUED | OUTPATIENT
Start: 2018-10-03 | End: 2018-10-04 | Stop reason: HOSPADM

## 2018-10-03 RX ORDER — GLYCOPYRROLATE 1 MG/5 ML
SYRINGE (ML) INTRAVENOUS PRN
Status: DISCONTINUED | OUTPATIENT
Start: 2018-10-03 | End: 2018-10-03 | Stop reason: SDUPTHER

## 2018-10-03 RX ORDER — LIDOCAINE HYDROCHLORIDE 10 MG/ML
1 INJECTION, SOLUTION EPIDURAL; INFILTRATION; INTRACAUDAL; PERINEURAL
Status: COMPLETED | OUTPATIENT
Start: 2018-10-03 | End: 2018-10-03

## 2018-10-03 RX ORDER — SODIUM CHLORIDE, SODIUM LACTATE, POTASSIUM CHLORIDE, CALCIUM CHLORIDE 600; 310; 30; 20 MG/100ML; MG/100ML; MG/100ML; MG/100ML
INJECTION, SOLUTION INTRAVENOUS CONTINUOUS
Status: DISCONTINUED | OUTPATIENT
Start: 2018-10-03 | End: 2018-10-03

## 2018-10-03 RX ADMIN — PHENYLEPHRINE HYDROCHLORIDE 50 MCG: 10 INJECTION INTRAVENOUS at 07:56

## 2018-10-03 RX ADMIN — MIDAZOLAM HYDROCHLORIDE 2 MG: 1 INJECTION, SOLUTION INTRAMUSCULAR; INTRAVENOUS at 07:24

## 2018-10-03 RX ADMIN — OXYCODONE HYDROCHLORIDE 5 MG: 5 TABLET ORAL at 22:08

## 2018-10-03 RX ADMIN — SODIUM CHLORIDE, POTASSIUM CHLORIDE, SODIUM LACTATE AND CALCIUM CHLORIDE: 600; 310; 30; 20 INJECTION, SOLUTION INTRAVENOUS at 06:32

## 2018-10-03 RX ADMIN — PHENYLEPHRINE HYDROCHLORIDE 50 MCG: 10 INJECTION INTRAVENOUS at 07:49

## 2018-10-03 RX ADMIN — HYDROMORPHONE HYDROCHLORIDE 0.5 MG: 1 INJECTION, SOLUTION INTRAMUSCULAR; INTRAVENOUS; SUBCUTANEOUS at 10:13

## 2018-10-03 RX ADMIN — FENTANYL CITRATE 25 MCG: 50 INJECTION, SOLUTION INTRAMUSCULAR; INTRAVENOUS at 08:05

## 2018-10-03 RX ADMIN — PROPOFOL 100 MG: 10 INJECTION, EMULSION INTRAVENOUS at 07:30

## 2018-10-03 RX ADMIN — SODIUM CHLORIDE, POTASSIUM CHLORIDE, SODIUM LACTATE AND CALCIUM CHLORIDE: 600; 310; 30; 20 INJECTION, SOLUTION INTRAVENOUS at 09:21

## 2018-10-03 RX ADMIN — Medication 0.2 MG: at 08:12

## 2018-10-03 RX ADMIN — PHENYLEPHRINE HYDROCHLORIDE 50 MCG: 10 INJECTION INTRAVENOUS at 08:27

## 2018-10-03 RX ADMIN — ONDANSETRON HYDROCHLORIDE 4 MG: 2 INJECTION, SOLUTION INTRAMUSCULAR; INTRAVENOUS at 07:34

## 2018-10-03 RX ADMIN — PROPOFOL 175 MG: 10 INJECTION, EMULSION INTRAVENOUS at 07:28

## 2018-10-03 RX ADMIN — PHENYLEPHRINE HYDROCHLORIDE 50 MCG: 10 INJECTION INTRAVENOUS at 08:00

## 2018-10-03 RX ADMIN — ENOXAPARIN SODIUM 40 MG: 40 INJECTION SUBCUTANEOUS at 11:25

## 2018-10-03 RX ADMIN — LIDOCAINE HYDROCHLORIDE 0.1 ML: 10 INJECTION, SOLUTION EPIDURAL; INFILTRATION; INTRACAUDAL; PERINEURAL at 06:31

## 2018-10-03 RX ADMIN — SODIUM CHLORIDE, PRESERVATIVE FREE 10 ML: 5 INJECTION INTRAVENOUS at 20:06

## 2018-10-03 RX ADMIN — PROPOFOL 25 MG: 10 INJECTION, EMULSION INTRAVENOUS at 07:29

## 2018-10-03 RX ADMIN — OXYCODONE HYDROCHLORIDE 5 MG: 5 TABLET ORAL at 12:50

## 2018-10-03 RX ADMIN — Medication 2 G: at 07:34

## 2018-10-03 RX ADMIN — ROCURONIUM BROMIDE 40 MG: 10 INJECTION INTRAVENOUS at 08:55

## 2018-10-03 RX ADMIN — FENTANYL CITRATE 25 MCG: 50 INJECTION, SOLUTION INTRAMUSCULAR; INTRAVENOUS at 07:28

## 2018-10-03 RX ADMIN — DEXAMETHASONE SODIUM PHOSPHATE 4 MG: 4 INJECTION, SOLUTION INTRAMUSCULAR; INTRAVENOUS at 07:34

## 2018-10-03 RX ADMIN — SODIUM CHLORIDE, POTASSIUM CHLORIDE, SODIUM LACTATE AND CALCIUM CHLORIDE: 600; 310; 30; 20 INJECTION, SOLUTION INTRAVENOUS at 07:51

## 2018-10-03 RX ADMIN — LIDOCAINE HYDROCHLORIDE 40 MG: 20 INJECTION, SOLUTION INFILTRATION; PERINEURAL at 07:28

## 2018-10-03 RX ADMIN — HYDROMORPHONE HYDROCHLORIDE 0.5 MG: 1 INJECTION, SOLUTION INTRAMUSCULAR; INTRAVENOUS; SUBCUTANEOUS at 09:20

## 2018-10-03 RX ADMIN — MEPERIDINE HYDROCHLORIDE 12.5 MG: 25 INJECTION INTRAMUSCULAR; INTRAVENOUS; SUBCUTANEOUS at 10:04

## 2018-10-03 RX ADMIN — ALUMINUM HYDROXIDE, MAGNESIUM HYDROXIDE, AND SIMETHICONE 30 ML: 200; 200; 20 SUSPENSION ORAL at 22:08

## 2018-10-03 RX ADMIN — PHENYLEPHRINE HYDROCHLORIDE 50 MCG: 10 INJECTION INTRAVENOUS at 08:42

## 2018-10-03 RX ADMIN — PHENYLEPHRINE HYDROCHLORIDE 50 MCG: 10 INJECTION INTRAVENOUS at 08:04

## 2018-10-03 RX ADMIN — SUGAMMADEX 200 MG: 100 INJECTION, SOLUTION INTRAVENOUS at 09:26

## 2018-10-03 RX ADMIN — OXYCODONE HYDROCHLORIDE 5 MG: 5 TABLET ORAL at 17:07

## 2018-10-03 RX ADMIN — KETOROLAC TROMETHAMINE 30 MG: 30 INJECTION, SOLUTION INTRAMUSCULAR; INTRAVENOUS at 09:23

## 2018-10-03 RX ADMIN — FENTANYL CITRATE 50 MCG: 50 INJECTION, SOLUTION INTRAMUSCULAR; INTRAVENOUS at 08:08

## 2018-10-03 RX ADMIN — SODIUM CHLORIDE, PRESERVATIVE FREE 10 ML: 5 INJECTION INTRAVENOUS at 11:32

## 2018-10-03 RX ADMIN — DEXTROSE AND SODIUM CHLORIDE: 5; 450 INJECTION, SOLUTION INTRAVENOUS at 11:25

## 2018-10-03 RX ADMIN — METRONIDAZOLE 500 MG: 500 INJECTION, SOLUTION INTRAVENOUS at 07:41

## 2018-10-03 RX ADMIN — PHENYLEPHRINE HYDROCHLORIDE 100 MCG: 10 INJECTION INTRAVENOUS at 08:36

## 2018-10-03 ASSESSMENT — PULMONARY FUNCTION TESTS
PIF_VALUE: 12
PIF_VALUE: 16
PIF_VALUE: 2
PIF_VALUE: 22
PIF_VALUE: 10
PIF_VALUE: 2
PIF_VALUE: 1
PIF_VALUE: 11
PIF_VALUE: 11
PIF_VALUE: 2
PIF_VALUE: 10
PIF_VALUE: 17
PIF_VALUE: 0
PIF_VALUE: 10
PIF_VALUE: 10
PIF_VALUE: 12
PIF_VALUE: 16
PIF_VALUE: 16
PIF_VALUE: 10
PIF_VALUE: 16
PIF_VALUE: 12
PIF_VALUE: 10
PIF_VALUE: 2
PIF_VALUE: 18
PIF_VALUE: 23
PIF_VALUE: 17
PIF_VALUE: 12
PIF_VALUE: 13
PIF_VALUE: 10
PIF_VALUE: 11
PIF_VALUE: 10
PIF_VALUE: 10
PIF_VALUE: 16
PIF_VALUE: 10
PIF_VALUE: 10
PIF_VALUE: 12
PIF_VALUE: 11
PIF_VALUE: 11
PIF_VALUE: 21
PIF_VALUE: 2
PIF_VALUE: 10
PIF_VALUE: 12
PIF_VALUE: 10
PIF_VALUE: 23
PIF_VALUE: 16
PIF_VALUE: 10
PIF_VALUE: 1
PIF_VALUE: 10
PIF_VALUE: 10
PIF_VALUE: 4
PIF_VALUE: 18
PIF_VALUE: 10
PIF_VALUE: 14
PIF_VALUE: 10
PIF_VALUE: 10
PIF_VALUE: 16
PIF_VALUE: 20
PIF_VALUE: 10
PIF_VALUE: 10
PIF_VALUE: 15
PIF_VALUE: 15
PIF_VALUE: 23
PIF_VALUE: 11
PIF_VALUE: 10
PIF_VALUE: 12
PIF_VALUE: 10
PIF_VALUE: 12
PIF_VALUE: 2
PIF_VALUE: 16
PIF_VALUE: 15
PIF_VALUE: 10
PIF_VALUE: 16
PIF_VALUE: 10
PIF_VALUE: 11
PIF_VALUE: 10
PIF_VALUE: 14
PIF_VALUE: 21
PIF_VALUE: 10
PIF_VALUE: 10
PIF_VALUE: 15
PIF_VALUE: 24
PIF_VALUE: 14
PIF_VALUE: 16
PIF_VALUE: 24
PIF_VALUE: 10
PIF_VALUE: 10
PIF_VALUE: 18
PIF_VALUE: 23
PIF_VALUE: 10
PIF_VALUE: 11
PIF_VALUE: 10
PIF_VALUE: 22
PIF_VALUE: 22
PIF_VALUE: 16
PIF_VALUE: 18
PIF_VALUE: 10
PIF_VALUE: 10
PIF_VALUE: 11
PIF_VALUE: 10
PIF_VALUE: 16
PIF_VALUE: 10
PIF_VALUE: 24
PIF_VALUE: 10
PIF_VALUE: 16
PIF_VALUE: 16
PIF_VALUE: 10
PIF_VALUE: 10
PIF_VALUE: 17

## 2018-10-03 ASSESSMENT — PAIN DESCRIPTION - PROGRESSION
CLINICAL_PROGRESSION: GRADUALLY IMPROVING
CLINICAL_PROGRESSION: GRADUALLY WORSENING

## 2018-10-03 ASSESSMENT — PAIN DESCRIPTION - LOCATION
LOCATION: ABDOMEN;PELVIS
LOCATION: ABDOMEN;PELVIS

## 2018-10-03 ASSESSMENT — PAIN DESCRIPTION - ONSET
ONSET: ON-GOING
ONSET: ON-GOING

## 2018-10-03 ASSESSMENT — PAIN DESCRIPTION - DESCRIPTORS
DESCRIPTORS: PRESSURE
DESCRIPTORS: PRESSURE

## 2018-10-03 ASSESSMENT — PAIN SCALES - GENERAL
PAINLEVEL_OUTOF10: 5
PAINLEVEL_OUTOF10: 0
PAINLEVEL_OUTOF10: 4
PAINLEVEL_OUTOF10: 5
PAINLEVEL_OUTOF10: 4
PAINLEVEL_OUTOF10: 3

## 2018-10-03 ASSESSMENT — PAIN - FUNCTIONAL ASSESSMENT: PAIN_FUNCTIONAL_ASSESSMENT: 0-10

## 2018-10-03 ASSESSMENT — PAIN DESCRIPTION - PAIN TYPE
TYPE: SURGICAL PAIN
TYPE: SURGICAL PAIN

## 2018-10-03 ASSESSMENT — PAIN DESCRIPTION - FREQUENCY
FREQUENCY: CONTINUOUS
FREQUENCY: CONTINUOUS

## 2018-10-03 NOTE — ANESTHESIA PRE PROCEDURE
Department of Anesthesiology  Preprocedure Note       Name:  Saman Nelson   Age:  59 y.o.  :  1955                                          MRN:  [id number]         Date:  10/3/2018      Surgeon: Winsome Kauffman):  Andrew Anderson MD    Procedure: Procedure(s):  TRANSVAGINAL RECTOCELE REPAIR,(PT REQUESTING DR Lina Hanson)    Medications prior to admission:   Prior to Admission medications    Medication Sig Start Date End Date Taking? Authorizing Provider   terconazole (TERAZOL 7) 0.4 % vaginal cream Place 3 applicators vaginally nightly Place vaginally nightly. 10/2/18  Yes Neeraj Quinones,    mometasone (NASONEX) 50 MCG/ACT nasal spray 2 sprays by Nasal route daily   Yes Historical Provider, MD   terconazole (TERAZOL 7) 0.4 % vaginal cream Place vaginally nightly. 10/2/18 10/9/18  Lb Quinones DO   fluconazole (DIFLUCAN) 150 MG tablet Every three days for three doses 18   Sylvie Thomas,    estradiol (VIVELLE) 0.1 MG/24HR 1 patch bid 18   Lb Quinones DO   albuterol sulfate  (90 Base) MCG/ACT inhaler Inhale 2 puffs into the lungs every 6 hours as needed for Wheezing 17   Danielle Montenegro, DO   Vitamin D (CHOLECALCIFEROL) 1000 UNITS CAPS capsule Take 2 capsules by mouth daily. 14   Ilya Mckeon MD   calcium carbonate 600 MG TABS tablet Take 1 tablet by mouth daily.     Historical Provider, MD       Current medications:    Current Facility-Administered Medications   Medication Dose Route Frequency Provider Last Rate Last Dose    metronidazole (FLAGYL) 500 mg in NaCl 100 mL IVPB premix  500 mg Intravenous Once Andrew Anderson MD        lactated ringers infusion   Intravenous Continuous Sherlynn Bodfish, APRN -  mL/hr at 10/03/18 8380      sodium chloride flush 0.9 % injection 10 mL  10 mL Intravenous 2 times per day Sherlynn Bodfish, APRN - CNP        sodium chloride flush 0.9 % injection 10 mL  10 mL Intravenous PRN Jeronimo Ulloa PAUL Chowdhury - CNP        ceFAZolin (ANCEF) 2 g in dextrose 5 % 100 mL IVPB  2 g Intravenous Once Nela Last MD           Allergies: Allergies   Allergen Reactions    Sulfamethoxazole-Trimethoprim Itching and Rash    Sulfamethoxazole-Trimethoprim Itching and Rash     body rash       Problem List:    Patient Active Problem List   Diagnosis Code    Osteopenia M85.80    Vitamin D deficiency E55.9    Asthma J45.909    GERD (gastroesophageal reflux disease) K21.9    Pain in joint, lower leg M25.569    Synovitis and tenosynovitis, unspecified M65.9       Past Medical History:        Diagnosis Date    Asthma     GERD (gastroesophageal reflux disease)     Osteopenia        Past Surgical History:        Procedure Laterality Date    HYSTERECTOMY  2004    total    KNEE ARTHROSCOPY  age 22    POLYPECTOMY  2010    colon, stomach    TONSILLECTOMY      child    UPPER GASTROINTESTINAL ENDOSCOPY  11/5/15     DR. LOVING        Social History:    Social History   Substance Use Topics    Smoking status: Never Smoker    Smokeless tobacco: Never Used    Alcohol use Yes      Comment: socially                                Counseling given: Not Answered      Vital Signs (Current):   Vitals:    10/03/18 0612   BP: 116/66   Pulse: 68   Resp: 16   Temp: 97.9 °F (36.6 °C)   TempSrc: Temporal   SpO2: 100%                                              BP Readings from Last 3 Encounters:   10/03/18 116/66   10/02/18 (!) 142/76   09/26/18 121/68       NPO Status: Time of last liquid consumption: 2200                        Time of last solid consumption: 2100                        Date of last liquid consumption: 10/02/18                        Date of last solid food consumption: 10/01/18    BMI:   Wt Readings from Last 3 Encounters:   10/02/18 143 lb (64.9 kg)   09/26/18 141 lb (64 kg)   09/14/18 145 lb (65.8 kg)     There is no height or weight on file to calculate BMI.    CBC:   Lab Results   Component Value Date    WBC 6.9 09/26/2018    RBC 4.52 09/26/2018    HGB 14.4 09/26/2018    HCT 42.5 09/26/2018    MCV 94.0 09/26/2018    RDW 13.3 09/26/2018     09/26/2018       CMP:   Lab Results   Component Value Date     09/26/2018    K 3.9 09/26/2018     09/26/2018    CO2 29 09/26/2018    BUN 13 09/26/2018    CREATININE 0.49 09/26/2018    GFRAA >60.0 09/26/2018    LABGLOM >60.0 09/26/2018    GLUCOSE 86 09/26/2018    PROT 6.6 06/29/2018    CALCIUM 9.2 09/26/2018    BILITOT 0.5 06/29/2018    ALKPHOS 38 06/29/2018    AST 15 06/29/2018    ALT 12 06/29/2018       POC Tests: No results for input(s): POCGLU, POCNA, POCK, POCCL, POCBUN, POCHEMO, POCHCT in the last 72 hours. Coags: No results found for: PROTIME, INR, APTT    HCG (If Applicable): No results found for: PREGTESTUR, PREGSERUM, HCG, HCGQUANT     ABGs: No results found for: PHART, PO2ART, FWQ2MAH, BZY4FRL, BEART, D2FNXFSN     Type & Screen (If Applicable):  No results found for: LABABO, [address]    Anesthesia Evaluation  Patient summary reviewed and Nursing notes reviewed no history of anesthetic complications:   Airway: Mallampati: II  TM distance: >3 FB   Neck ROM: full  Mouth opening: > = 3 FB Dental: normal exam         Pulmonary:normal exam  breath sounds clear to auscultation  (+) asthma:                            Cardiovascular:Negative CV ROS  Exercise tolerance: good (>4 METS),         ECG reviewed  Rhythm: regular  Rate: normal           Beta Blocker:  Not on Beta Blocker      ROS comment: Sinus bradycardia  Otherwise normal ECG  No previous ECGs available     Neuro/Psych:   Negative Neuro/Psych ROS              GI/Hepatic/Renal:   (+) GERD:,           Endo/Other: Negative Endo/Other ROS             Pt had PAT visit. Abdominal:           Vascular:                                        Anesthesia Plan      general     ASA 1     (LMA)  Induction: intravenous.     MIPS: Postoperative opioids intended and Prophylactic antiemetics

## 2018-10-03 NOTE — BRIEF OP NOTE
Brief Postoperative Note    Tommy April  YOB: 1955  [id number]    Pre-operative Diagnosis: Symptomatic rectocele    Post-operative Diagnosis: Same, enterocele    Procedure: Transvaginal enterocele repair, diagnostic laparoscopy    Anesthesia: General    Surgeons/Assistants: Karissa Acevedo    Estimated Blood Loss: 50    Complications: None    Specimens: Was Not Obtained    Findings: Large enterocele    Electronically signed by Dafne Daily MD on 10/3/2018 at 9:41 AM

## 2018-10-03 NOTE — OP NOTE
[address]                       [facility], [address] [facility]                                 OPERATIVE REPORT    PATIENT NAME: Prakash Officer                     :        1955  MED REC NO:   [id number]                            ROOM:  ACCOUNT NO:   [de-identified]                           ADMIT DATE: 10/03/2018  PROVIDER:     Andrew Perea MD    DATE OF PROCEDURE:  10/03/2018    PREOPERATIVE DIAGNOSIS:  Third-degree rectocele. POSTOPERATIVE DIAGNOSES:  1. Third-degree rectocele. 2.  Enterocele. OPERATION PERFORMED:  1. Transvaginal rectocele and enterocele repair. 2.  Diagnostic laparoscopy. ANESTHESIA:  General endotracheal anesthesia. SPECIMENS:  None. ESTIMATED BLOOD LOSS:  50 mL. COMPLICATIONS:  None. INDICATIONS:  This is a 59-year-old female with a symptomatic rectocele,  who has been having problems with defecation along with pain during  intercourse. She had failed conservative management of this rectocele. I  discussed the risks and benefits of transvaginal rectocele repair. Risks  of infection, bleeding, and recurrence were outlined. Decrease in efficacy  overtime was outlined as well. Wound complications from the vaginal  incision were discussed as well. Despite these risks, she understood the  potential benefits and wished to proceed. Consents obtained. OPERATIVE PROCEDURE:  She was taken to the operating room and placed in the  supine position. General endotracheal anesthesia was administered without  problems. She was placed in stirrups. All pressure points were properly  padded. An in and out catheter was placed to drain her bladder. Her  vaginal area was prepped and draped with a Betadine-containing solution. She received Ancef and Flagyl preoperatively. A time-out was taken for  appropriate verification. She had mechanical DVT prophylaxis present.     A vaginal exam combined with a rectal exam showed a large rectocele. At  this time, I injected 0.25% Marcaine into the vaginal mucosa and made an  elliptical incision removing a section of vaginal mucosa. When I was doing  this, I entered into a hernia sac consistent with an enterocele. Small  bowel was identified in the sac. There was no injury to the small bowel. I took the sac and excised the redundant peritoneum using electrocautery  keeping the bowel proximally with a sponge stick. Once I excised all the  redundant hernia sac, I used 0 Ethibond to reapproximate the pelvic floor  and collapse the space. I used six Ethibonds to recreate the pelvic floor  in this area to not only obliterate the enterocele but also repair  concurrent rectocele. This was done with interrupted 0 Ethibond as  described above. Once this was complete, rectal exam showed no impingement of the rectum. The rectovaginal septum was much stronger after repair. Excellent  hemostasis was achieved and assured. A running 2-0 Vicryl suture was used  to approximate the vaginal mucosa. Additional 0.25% Marcaine was injected. At this time, given the unsuspected enterocele diagnosis, I wished to  proceed with laparoscopy to ensure that there was no injury to the small  bowel and to ensure that the cavity had been obliterated. I went out  personally and spoke to the  who gave permission for diagnostic  laparoscopy confirmed by the nurse in the room. A Betadine-soaked vaginal packing was placed and the dressings were all  removed. The abdomen was prepped and draped with a ChloraPrep-containing  solution. A Vance catheter was placed. A time-out was again taken for  verification. A left upper quadrant 5-mm incision was made and optical trocar was placed. Pneumoperitoneum was established. The patient was placed in Trendelenburg. The enterocele cavity was completely obliterated with the repair and  appropriate pictures and images were taken.   These were added to the

## 2018-10-04 VITALS
HEART RATE: 50 BPM | DIASTOLIC BLOOD PRESSURE: 46 MMHG | SYSTOLIC BLOOD PRESSURE: 102 MMHG | WEIGHT: 142.86 LBS | HEIGHT: 63 IN | BODY MASS INDEX: 25.31 KG/M2 | OXYGEN SATURATION: 100 % | TEMPERATURE: 97.9 F | RESPIRATION RATE: 18 BRPM

## 2018-10-04 PROCEDURE — G0378 HOSPITAL OBSERVATION PER HR: HCPCS

## 2018-10-04 PROCEDURE — 6370000000 HC RX 637 (ALT 250 FOR IP): Performed by: SURGERY

## 2018-10-04 PROCEDURE — 6370000000 HC RX 637 (ALT 250 FOR IP): Performed by: COLON & RECTAL SURGERY

## 2018-10-04 PROCEDURE — 6360000002 HC RX W HCPCS: Performed by: COLON & RECTAL SURGERY

## 2018-10-04 PROCEDURE — 99024 POSTOP FOLLOW-UP VISIT: CPT | Performed by: COLON & RECTAL SURGERY

## 2018-10-04 RX ORDER — OXYCODONE HYDROCHLORIDE AND ACETAMINOPHEN 5; 325 MG/1; MG/1
1 TABLET ORAL EVERY 4 HOURS PRN
Qty: 20 TABLET | Refills: 0 | Status: SHIPPED | OUTPATIENT
Start: 2018-10-04 | End: 2018-10-07

## 2018-10-04 RX ADMIN — ENOXAPARIN SODIUM 40 MG: 40 INJECTION SUBCUTANEOUS at 09:14

## 2018-10-04 RX ADMIN — OXYCODONE HYDROCHLORIDE 5 MG: 5 TABLET ORAL at 02:19

## 2018-10-04 RX ADMIN — OXYCODONE HYDROCHLORIDE 5 MG: 5 TABLET ORAL at 11:20

## 2018-10-04 RX ADMIN — DOCUSATE SODIUM 100 MG: 100 CAPSULE, LIQUID FILLED ORAL at 09:14

## 2018-10-04 RX ADMIN — OXYCODONE HYDROCHLORIDE 10 MG: 5 TABLET ORAL at 06:22

## 2018-10-04 ASSESSMENT — PAIN SCALES - GENERAL
PAINLEVEL_OUTOF10: 2
PAINLEVEL_OUTOF10: 5
PAINLEVEL_OUTOF10: 7
PAINLEVEL_OUTOF10: 5

## 2018-10-04 NOTE — DISCHARGE SUMMARY
Physician Discharge Summary     Patient ID:  Tyson Thurston  [id number]  4 y.o.  1955    Admit date: 10/3/2018    Discharge date and time: 10/4/2018    Admitting Physician: Nela Last MD     Discharge Physician: Same    Admission Diagnoses: Enterocele [K46.9]    Discharge Diagnoses: Same    Admission Condition: good    Discharged Condition: good    Indication for Admission: Symptomatic rectocele    Hospital Course: She was brought to the operating room on 10/3/2018 for a rectocele repair. She had a concurrent enterocele which was repaired as well. The details of the operation can be found in the operative report. She was taken to the floor. She had excellent pain control. DVT prophylaxis was initiated. She was started on clear liquids and advance to regular diet prior to discharge. The following morning her Vance catheter was removed and the vaginal packing was removed. Discharge instructions were given the bedside regarding activity, medications, activity, and wound care. Follow-up was given. Consults: none    Significant Diagnostic Studies: None    Treatments: surgery: Diagnostic laparoscopy/rectocele/enterocele repair    Discharge Exam:  See progress note dated 10/4/2018    Disposition: home    In process/preliminary results:  Outstanding Order Results     No orders found for last 30 day(s). Patient Instructions:   Current Discharge Medication List      START taking these medications    Details   oxyCODONE-acetaminophen (PERCOCET) 5-325 MG per tablet Take 1 tablet by mouth every 4 hours as needed for Pain for up to 3 days. Intended supply: 3 days. Take lowest dose possible to manage pain.   Qty: 20 tablet, Refills: 0    Associated Diagnoses: Enterocele         CONTINUE these medications which have NOT CHANGED    Details   mometasone (NASONEX) 50 MCG/ACT nasal spray 2 sprays by Nasal route daily    Associated Diagnoses: Sinus congestion      fluconazole (DIFLUCAN) 150 MG tablet Every three days for three doses  Qty: 3 tablet, Refills: 3      estradiol (VIVELLE) 0.1 MG/24HR 1 patch bid  Qty: 24 patch, Refills: 3      albuterol sulfate  (90 Base) MCG/ACT inhaler Inhale 2 puffs into the lungs every 6 hours as needed for Wheezing  Qty: 1 Inhaler, Refills: 3    Associated Diagnoses: Mild intermittent asthma without complication      Vitamin D (CHOLECALCIFEROL) 1000 UNITS CAPS capsule Take 2 capsules by mouth daily. Qty: 30 capsule      calcium carbonate 600 MG TABS tablet Take 1 tablet by mouth daily. STOP taking these medications       terconazole (TERAZOL 7) 0.4 % vaginal cream Comments:   Reason for Stopping:         terconazole (TERAZOL 7) 0.4 % vaginal cream Comments:   Reason for Stopping:             Activity: no heavy lifting for 3 weeks  Diet: regular diet  Wound Care: keep wound clean and dry    Follow-up with Elizabeth Trinidad in 8 days.     SignedVern Byron  10/4/2018  9:28 AM

## 2018-10-08 ENCOUNTER — TELEPHONE (OUTPATIENT)
Dept: INTERNAL MEDICINE CLINIC | Age: 63
End: 2018-10-08

## 2018-10-12 ENCOUNTER — OFFICE VISIT (OUTPATIENT)
Dept: SURGERY | Age: 63
End: 2018-10-12

## 2018-10-12 VITALS — WEIGHT: 143 LBS | BODY MASS INDEX: 25.34 KG/M2 | HEIGHT: 63 IN | TEMPERATURE: 97.3 F

## 2018-10-12 DIAGNOSIS — K46.9 ENTEROCELE: Primary | ICD-10-CM

## 2018-10-12 PROCEDURE — 99024 POSTOP FOLLOW-UP VISIT: CPT | Performed by: COLON & RECTAL SURGERY

## 2018-10-12 NOTE — PROGRESS NOTES
Subjective:      Patient ID: Yasmine Plata is a 59 y.o. female who presents for:  Chief Complaint   Patient presents with    Post-Op Check       She returns to the office 9 days out from a enterocele/rectocele repair. She's done very well. Her bowels are working without issues or difficulty. She had some urinary retention which has since resolved. She has minimal vaginal drainage. She no longer requires pain medication. Past Medical History:   Diagnosis Date    Asthma     GERD (gastroesophageal reflux disease)     Osteopenia      Past Surgical History:   Procedure Laterality Date    HYSTERECTOMY  2004    total    KNEE ARTHROSCOPY  age 22    LAPAROSCOPY N/A 10/3/2018    LAPAROSCOPY EXPLORATORY performed by Houston Quintana MD at [address] [place] [address] POLYPECTOMY  2010    colon, stomach    VT RECTUM SURGERY PROCEDURE UNLISTED N/A 10/3/2018    TRANSVAGINAL RECTOCELE REPAIR and Diagnostic Laproscopy performed by Houston Quintana MD at [address] [place] [address]      child    UPPER GASTROINTESTINAL ENDOSCOPY  11/5/15     DR. LOVING      Social History     Social History    Marital status:      Spouse name: N/A    Number of children: N/A    Years of education: N/A     Occupational History    Not on file.      Social History Main Topics    Smoking status: Never Smoker    Smokeless tobacco: Never Used    Alcohol use Yes      Comment: socially    Drug use: No    Sexual activity: Yes     Partners: Male     Other Topics Concern    Not on file     Social History Narrative    No narrative on file     Family History   Problem Relation Age of Onset    Arthritis Mother         RA    Asthma Mother     Kidney Disease Mother     Arthritis Father         RA    Cancer Father         prostate    Heart Disease Father     High Blood Pressure Father     High Cholesterol Father     Stroke Father     Other Father         melenoma     Allergies:  Sulfamethoxazole-trimethoprim and

## 2018-11-02 ENCOUNTER — OFFICE VISIT (OUTPATIENT)
Dept: SURGERY | Age: 63
End: 2018-11-02

## 2018-11-02 VITALS
BODY MASS INDEX: 25.16 KG/M2 | HEIGHT: 63 IN | WEIGHT: 142 LBS | TEMPERATURE: 98.2 F | SYSTOLIC BLOOD PRESSURE: 116 MMHG | DIASTOLIC BLOOD PRESSURE: 78 MMHG

## 2018-11-02 DIAGNOSIS — N81.5 VAGINAL ENTEROCELE: Primary | ICD-10-CM

## 2018-11-02 PROCEDURE — 99024 POSTOP FOLLOW-UP VISIT: CPT | Performed by: COLON & RECTAL SURGERY

## 2018-11-02 ASSESSMENT — ENCOUNTER SYMPTOMS
ANAL BLEEDING: 0
CONSTIPATION: 0
ABDOMINAL DISTENTION: 0
DIARRHEA: 0
RECTAL PAIN: 0
BLOOD IN STOOL: 0

## 2018-12-14 ENCOUNTER — OFFICE VISIT (OUTPATIENT)
Dept: INTERNAL MEDICINE CLINIC | Age: 63
End: 2018-12-14
Payer: COMMERCIAL

## 2018-12-14 VITALS
BODY MASS INDEX: 25.65 KG/M2 | WEIGHT: 144.8 LBS | TEMPERATURE: 98.5 F | SYSTOLIC BLOOD PRESSURE: 119 MMHG | DIASTOLIC BLOOD PRESSURE: 63 MMHG | RESPIRATION RATE: 17 BRPM | HEART RATE: 81 BPM | OXYGEN SATURATION: 98 %

## 2018-12-14 DIAGNOSIS — J01.11 ACUTE RECURRENT FRONTAL SINUSITIS: Primary | ICD-10-CM

## 2018-12-14 DIAGNOSIS — R05.9 COUGH: ICD-10-CM

## 2018-12-14 PROCEDURE — 99213 OFFICE O/P EST LOW 20 MIN: CPT | Performed by: FAMILY MEDICINE

## 2018-12-14 RX ORDER — PROMETHAZINE HYDROCHLORIDE AND CODEINE PHOSPHATE 6.25; 1 MG/5ML; MG/5ML
5 SYRUP ORAL EVERY 4 HOURS PRN
Qty: 240 ML | Refills: 1 | Status: SHIPPED | OUTPATIENT
Start: 2018-12-14 | End: 2019-04-09

## 2018-12-14 RX ORDER — LEVOFLOXACIN 500 MG/1
500 TABLET, FILM COATED ORAL DAILY
Qty: 10 TABLET | Refills: 0 | Status: SHIPPED | OUTPATIENT
Start: 2018-12-14 | End: 2018-12-24

## 2018-12-14 ASSESSMENT — ENCOUNTER SYMPTOMS
EYES NEGATIVE: 1
CHEST TIGHTNESS: 0
COUGH: 1
SORE THROAT: 1
GASTROINTESTINAL NEGATIVE: 1
RHINORRHEA: 0
SINUS COMPLAINT: 1
HOARSE VOICE: 1
SINUS PRESSURE: 1

## 2018-12-14 ASSESSMENT — PATIENT HEALTH QUESTIONNAIRE - PHQ9
SUM OF ALL RESPONSES TO PHQ QUESTIONS 1-9: 0
DEPRESSION UNABLE TO ASSESS: FUNCTIONAL CAPACITY MOTIVATION LIMITS ACCURACY
SUM OF ALL RESPONSES TO PHQ9 QUESTIONS 1 & 2: 0
1. LITTLE INTEREST OR PLEASURE IN DOING THINGS: 0
2. FEELING DOWN, DEPRESSED OR HOPELESS: 0
SUM OF ALL RESPONSES TO PHQ QUESTIONS 1-9: 0

## 2018-12-14 NOTE — PROGRESS NOTES
exhibits no distension and no mass. There is no tenderness. There is no rebound and no guarding. Musculoskeletal: Normal range of motion. Lymphadenopathy:     She has no cervical adenopathy. Neurological: She is alert and oriented to person, place, and time. No cranial nerve deficit. Coordination normal.   Skin: Skin is warm and dry. Psychiatric: She has a normal mood and affect. Assessment   Diagnosis Orders   1. Acute recurrent frontal sinusitis  levofloxacin (LEVAQUIN) 500 MG tablet   2. Cough  promethazine-codeine (PHENERGAN WITH CODEINE) 6.25-10 MG/5ML syrup     Problem List     None          Plan  No orders of the defined types were placed in this encounter. Orders Placed This Encounter   Medications    levofloxacin (LEVAQUIN) 500 MG tablet     Sig: Take 1 tablet by mouth daily for 10 days     Dispense:  10 tablet     Refill:  0    promethazine-codeine (PHENERGAN WITH CODEINE) 6.25-10 MG/5ML syrup     Sig: Take 5 mLs by mouth every 4 hours as needed for Cough. .     Dispense:  240 mL     Refill:  1     No Follow-up on file.   Merline Going, MD

## 2018-12-17 ENCOUNTER — TELEPHONE (OUTPATIENT)
Dept: INTERNAL MEDICINE CLINIC | Age: 63
End: 2018-12-17

## 2018-12-17 RX ORDER — AMOXICILLIN AND CLAVULANATE POTASSIUM 875; 125 MG/1; MG/1
1 TABLET, FILM COATED ORAL 2 TIMES DAILY
Qty: 20 TABLET | Refills: 0 | Status: SHIPPED | OUTPATIENT
Start: 2018-12-17 | End: 2019-04-09 | Stop reason: SDUPTHER

## 2018-12-17 NOTE — TELEPHONE ENCOUNTER
Patient calling into the office stating that the Levaquin is not work and would like to know if you can put her back on the Augmentin. Please advise.

## 2019-03-22 ENCOUNTER — HOSPITAL ENCOUNTER (OUTPATIENT)
Dept: ORTHOPEDIC SURGERY | Age: 64
Discharge: HOME | End: 2019-03-24
Payer: COMMERCIAL

## 2019-03-22 DIAGNOSIS — M25.569 ARTHRALGIA OF LOWER LEG, UNSPECIFIED LATERALITY: ICD-10-CM

## 2019-03-22 PROCEDURE — 73564 X-RAY EXAM KNEE 4 OR MORE: CPT

## 2019-04-09 ENCOUNTER — OFFICE VISIT (OUTPATIENT)
Dept: INTERNAL MEDICINE CLINIC | Age: 64
End: 2019-04-09
Payer: COMMERCIAL

## 2019-04-09 VITALS
OXYGEN SATURATION: 98 % | WEIGHT: 146 LBS | DIASTOLIC BLOOD PRESSURE: 74 MMHG | TEMPERATURE: 98.1 F | SYSTOLIC BLOOD PRESSURE: 132 MMHG | HEIGHT: 63 IN | BODY MASS INDEX: 25.87 KG/M2 | HEART RATE: 69 BPM | RESPIRATION RATE: 14 BRPM

## 2019-04-09 DIAGNOSIS — B96.89 ACUTE BACTERIAL SINUSITIS: Primary | ICD-10-CM

## 2019-04-09 DIAGNOSIS — J45.21 MILD INTERMITTENT ASTHMA WITH ACUTE EXACERBATION: ICD-10-CM

## 2019-04-09 DIAGNOSIS — T36.95XA ANTIBIOTIC-INDUCED YEAST INFECTION: ICD-10-CM

## 2019-04-09 DIAGNOSIS — J01.90 ACUTE BACTERIAL SINUSITIS: Primary | ICD-10-CM

## 2019-04-09 DIAGNOSIS — B37.9 ANTIBIOTIC-INDUCED YEAST INFECTION: ICD-10-CM

## 2019-04-09 PROCEDURE — 1036F TOBACCO NON-USER: CPT | Performed by: NURSE PRACTITIONER

## 2019-04-09 PROCEDURE — 99213 OFFICE O/P EST LOW 20 MIN: CPT | Performed by: NURSE PRACTITIONER

## 2019-04-09 PROCEDURE — G8427 DOCREV CUR MEDS BY ELIG CLIN: HCPCS | Performed by: NURSE PRACTITIONER

## 2019-04-09 PROCEDURE — G8419 CALC BMI OUT NRM PARAM NOF/U: HCPCS | Performed by: NURSE PRACTITIONER

## 2019-04-09 PROCEDURE — 3017F COLORECTAL CA SCREEN DOC REV: CPT | Performed by: NURSE PRACTITIONER

## 2019-04-09 RX ORDER — AMOXICILLIN AND CLAVULANATE POTASSIUM 875; 125 MG/1; MG/1
1 TABLET, FILM COATED ORAL 2 TIMES DAILY
Qty: 20 TABLET | Refills: 0 | Status: SHIPPED | OUTPATIENT
Start: 2019-04-09 | End: 2019-06-10 | Stop reason: SDUPTHER

## 2019-04-09 RX ORDER — FLUCONAZOLE 150 MG/1
150 TABLET ORAL ONCE
Qty: 2 TABLET | Refills: 0 | Status: SHIPPED | OUTPATIENT
Start: 2019-04-09 | End: 2019-04-09

## 2019-04-09 RX ORDER — METHYLPREDNISOLONE 4 MG/1
TABLET ORAL
Qty: 1 KIT | Refills: 0 | Status: SHIPPED | OUTPATIENT
Start: 2019-04-09 | End: 2019-06-10

## 2019-04-09 ASSESSMENT — PATIENT HEALTH QUESTIONNAIRE - PHQ9
2. FEELING DOWN, DEPRESSED OR HOPELESS: 0
1. LITTLE INTEREST OR PLEASURE IN DOING THINGS: 0
SUM OF ALL RESPONSES TO PHQ9 QUESTIONS 1 & 2: 0
SUM OF ALL RESPONSES TO PHQ QUESTIONS 1-9: 0
SUM OF ALL RESPONSES TO PHQ QUESTIONS 1-9: 0

## 2019-04-09 ASSESSMENT — ENCOUNTER SYMPTOMS
SORE THROAT: 1
SINUS PRESSURE: 1
SHORTNESS OF BREATH: 0
DIARRHEA: 0
TROUBLE SWALLOWING: 0
FACIAL SWELLING: 0
EYE PAIN: 0
NAUSEA: 0
ABDOMINAL PAIN: 0
VOMITING: 0
HOARSE VOICE: 1
SWOLLEN GLANDS: 0
COUGH: 1
RHINORRHEA: 1
EYE DISCHARGE: 0
WHEEZING: 0

## 2019-04-09 NOTE — PROGRESS NOTES
Subjective:     Patient ID: Jenny Springer is a 59 y.o. female who presentstoday for:  Chief Complaint   Patient presents with    Sinusitis     Pt. is here for a sinus infection X 1 week that is gradually worsening. Pt. c/o productive cough, sinus pressure, nasal congestion, rhinorrhea, PND, right ear pain, night sweats, chills, sore throat and headache. Pt. has taken Mucinex DM, inhaler, tessalon perles and netti pot with mild relief.  Health Maintenance     Pt. declines all vaccines. Sinusitis   This is a new problem. The current episode started in the past 7 days. The problem has been gradually worsening since onset. There has been no fever. Associated symptoms include chills, congestion, coughing, diaphoresis, headaches, a hoarse voice, sinus pressure, sneezing and a sore throat. Pertinent negatives include no ear pain, neck pain, shortness of breath or swollen glands. Past treatments include oral decongestants (inhaler). The treatment provided mild relief. Past Medical History:   Diagnosis Date    Asthma     GERD (gastroesophageal reflux disease)     Osteopenia      Current Outpatient Medications on File Prior to Visit   Medication Sig Dispense Refill    estradiol (VIVELLE) 0.1 MG/24HR 1 patch bid 24 patch 3    albuterol sulfate  (90 Base) MCG/ACT inhaler Inhale 2 puffs into the lungs every 6 hours as needed for Wheezing 1 Inhaler 3    mometasone (NASONEX) 50 MCG/ACT nasal spray 2 sprays by Nasal route daily      Vitamin D (CHOLECALCIFEROL) 1000 UNITS CAPS capsule Take 2 capsules by mouth daily. 30 capsule     calcium carbonate 600 MG TABS tablet Take 1 tablet by mouth daily. No current facility-administered medications on file prior to visit.       Past Surgical History:   Procedure Laterality Date    HYSTERECTOMY  2004    total    KNEE ARTHROSCOPY  age 22    LAPAROSCOPY N/A 10/3/2018    LAPAROSCOPY EXPLORATORY performed by Fernando Meyers MD at [address] [place] [address] Not on file   Social History Narrative    Not on file     Allergies:  Sulfamethoxazole-trimethoprim and Sulfamethoxazole-trimethoprim    Review of Systems   Constitutional: Positive for chills and diaphoresis. Negative for activity change, appetite change, fatigue and fever. HENT: Positive for congestion, hoarse voice, postnasal drip, rhinorrhea, sinus pressure, sneezing and sore throat. Negative for ear pain, facial swelling and trouble swallowing. Eyes: Negative for pain, discharge and visual disturbance. Respiratory: Positive for cough. Negative for shortness of breath and wheezing. Cardiovascular: Negative for chest pain, palpitations and leg swelling. Gastrointestinal: Negative for abdominal pain, diarrhea, nausea and vomiting. Musculoskeletal: Negative for neck pain. Skin: Negative for rash. Allergic/Immunologic: Positive for environmental allergies. Neurological: Positive for headaches. Negative for dizziness, weakness, light-headedness and numbness. Objective:    /74 (Site: Left Upper Arm, Position: Sitting, Cuff Size: Medium Adult)   Pulse 69   Temp 98.1 °F (36.7 °C) (Oral)   Resp 14   Ht 5' 3\" (1.6 m)   Wt 146 lb (66.2 kg)   LMP  (Exact Date)   SpO2 98%   Breastfeeding? No   BMI 25.86 kg/m²     Physical Exam   Constitutional: She is oriented to person, place, and time. She appears well-developed and well-nourished. No distress. HENT:   Head: Normocephalic and atraumatic. Right Ear: Hearing, tympanic membrane, external ear and ear canal normal.   Left Ear: Hearing, tympanic membrane, external ear and ear canal normal.   Nose: Mucosal edema and rhinorrhea present. Right sinus exhibits maxillary sinus tenderness and frontal sinus tenderness. Left sinus exhibits maxillary sinus tenderness and frontal sinus tenderness. Mouth/Throat: Uvula is midline and mucous membranes are normal. Posterior oropharyngeal erythema present.  No oropharyngeal exudate or posterior oropharyngeal edema. Eyes: Conjunctivae are normal. Right eye exhibits no discharge. Left eye exhibits no discharge. Neck: Normal range of motion. Neck supple. Cardiovascular: Normal rate, regular rhythm and normal heart sounds. Pulmonary/Chest: Effort normal. No respiratory distress. She has no decreased breath sounds. She has wheezes. She has no rhonchi. She has no rales. Lymphadenopathy:     She has no cervical adenopathy. Neurological: She is alert and oriented to person, place, and time. Skin: Skin is warm. No rash noted. She is not diaphoretic. Vitals reviewed. Assessment & Plan:       Diagnosis Orders   1. Acute bacterial sinusitis  amoxicillin-clavulanate (AUGMENTIN) 875-125 MG per tablet   2. Mild intermittent asthma with acute exacerbation  methylPREDNISolone (MEDROL, AIDAN,) 4 MG tablet   3. Antibiotic-induced yeast infection  fluconazole (DIFLUCAN) 150 MG tablet     No orders of the defined types were placed in this encounter. Orders Placed This Encounter   Medications    amoxicillin-clavulanate (AUGMENTIN) 875-125 MG per tablet     Sig: Take 1 tablet by mouth 2 times daily for 10 days     Dispense:  20 tablet     Refill:  0    methylPREDNISolone (MEDROL, AIDAN,) 4 MG tablet     Sig: Take by mouth. Dispense:  1 kit     Refill:  0    fluconazole (DIFLUCAN) 150 MG tablet     Sig: Take 1 tablet by mouth once for 1 dose     Dispense:  2 tablet     Refill:  0     Medications Discontinued During This Encounter   Medication Reason    promethazine-codeine (PHENERGAN WITH CODEINE) 6.25-10 MG/5ML syrup     amoxicillin-clavulanate (AUGMENTIN) 875-125 MG per tablet REORDER     Return if symptoms worsen or fail to improve. Reviewed with the patient: currentclinical status, medications, activities and diet.      Side effects, adverse effects of the medicationprescribed today, as well as treatment plan/ rationale and result expectations havebeen discussed with the patient who expresses

## 2019-05-03 ENCOUNTER — TELEPHONE (OUTPATIENT)
Dept: FAMILY MEDICINE CLINIC | Age: 64
End: 2019-05-03

## 2019-05-03 RX ORDER — DOXYCYCLINE HYCLATE 100 MG
100 TABLET ORAL 2 TIMES DAILY
Qty: 14 TABLET | Refills: 0 | Status: SHIPPED | OUTPATIENT
Start: 2019-05-03 | End: 2019-05-10

## 2019-05-03 NOTE — TELEPHONE ENCOUNTER
Pt called stating her sinus sx have returned. Same sx as before - cough, yellow/brownish phlegm   Asthma doing ok w/ inhalers. Pt states last antibiotic was on the 20th  Pt questioning if she should get another round of antibiotic.     Local pharm updated in pt's chart - [facility] out [facility] [place]

## 2019-06-10 ENCOUNTER — OFFICE VISIT (OUTPATIENT)
Dept: FAMILY MEDICINE CLINIC | Age: 64
End: 2019-06-10
Payer: COMMERCIAL

## 2019-06-10 VITALS
OXYGEN SATURATION: 99 % | SYSTOLIC BLOOD PRESSURE: 118 MMHG | RESPIRATION RATE: 14 BRPM | HEIGHT: 63 IN | BODY MASS INDEX: 26.05 KG/M2 | HEART RATE: 74 BPM | WEIGHT: 147 LBS | TEMPERATURE: 99.1 F | DIASTOLIC BLOOD PRESSURE: 70 MMHG

## 2019-06-10 DIAGNOSIS — J45.20 MILD INTERMITTENT ASTHMA WITHOUT COMPLICATION: ICD-10-CM

## 2019-06-10 DIAGNOSIS — T36.95XA ANTIBIOTIC-INDUCED YEAST INFECTION: ICD-10-CM

## 2019-06-10 DIAGNOSIS — J32.9 RECURRENT SINUSITIS: ICD-10-CM

## 2019-06-10 DIAGNOSIS — B37.9 ANTIBIOTIC-INDUCED YEAST INFECTION: ICD-10-CM

## 2019-06-10 PROCEDURE — G8427 DOCREV CUR MEDS BY ELIG CLIN: HCPCS | Performed by: NURSE PRACTITIONER

## 2019-06-10 PROCEDURE — 3017F COLORECTAL CA SCREEN DOC REV: CPT | Performed by: NURSE PRACTITIONER

## 2019-06-10 PROCEDURE — 99214 OFFICE O/P EST MOD 30 MIN: CPT | Performed by: NURSE PRACTITIONER

## 2019-06-10 PROCEDURE — G8419 CALC BMI OUT NRM PARAM NOF/U: HCPCS | Performed by: NURSE PRACTITIONER

## 2019-06-10 PROCEDURE — 1036F TOBACCO NON-USER: CPT | Performed by: NURSE PRACTITIONER

## 2019-06-10 RX ORDER — ALBUTEROL SULFATE 90 UG/1
2 AEROSOL, METERED RESPIRATORY (INHALATION) EVERY 6 HOURS PRN
Qty: 1 INHALER | Refills: 3 | Status: SHIPPED | OUTPATIENT
Start: 2019-06-10 | End: 2020-02-03 | Stop reason: SDUPTHER

## 2019-06-10 RX ORDER — METHYLPREDNISOLONE 4 MG/1
TABLET ORAL
Qty: 1 KIT | Refills: 0 | Status: SHIPPED | OUTPATIENT
Start: 2019-06-10 | End: 2020-02-03

## 2019-06-10 RX ORDER — PROMETHAZINE HYDROCHLORIDE AND CODEINE PHOSPHATE 6.25; 1 MG/5ML; MG/5ML
5 SYRUP ORAL EVERY 4 HOURS PRN
Qty: 180 ML | Refills: 0 | Status: SHIPPED | OUTPATIENT
Start: 2019-06-10 | End: 2019-06-17

## 2019-06-10 RX ORDER — FLUCONAZOLE 150 MG/1
150 TABLET ORAL ONCE
Qty: 2 TABLET | Refills: 0 | Status: SHIPPED | OUTPATIENT
Start: 2019-06-10 | End: 2019-06-10

## 2019-06-10 RX ORDER — AMOXICILLIN AND CLAVULANATE POTASSIUM 875; 125 MG/1; MG/1
1 TABLET, FILM COATED ORAL 2 TIMES DAILY
Qty: 28 TABLET | Refills: 0 | Status: SHIPPED | OUTPATIENT
Start: 2019-06-10 | End: 2020-08-25 | Stop reason: SDUPTHER

## 2019-06-10 ASSESSMENT — ENCOUNTER SYMPTOMS
SORE THROAT: 1
FACIAL SWELLING: 0
SINUS PRESSURE: 1
NAUSEA: 0
EYE DISCHARGE: 0
TROUBLE SWALLOWING: 0
RHINORRHEA: 1
VOMITING: 0
SWOLLEN GLANDS: 0
COUGH: 1
ABDOMINAL PAIN: 0
EYE PAIN: 0
WHEEZING: 0
DIARRHEA: 0
HOARSE VOICE: 1
SHORTNESS OF BREATH: 1

## 2019-06-10 NOTE — PROGRESS NOTES
Diagnostic Laproscopy performed by Alvina Asencio MD at [address] [facility]      child   [facility] ENDOSCOPY  11/5/15     DR. LOVING         Family History   Problem Relation Age of Onset    Arthritis Mother         RA    Asthma Mother     Kidney Disease Mother     Arthritis Father         RA    Cancer Father         prostate    Heart Disease Father     High Blood Pressure Father     High Cholesterol Father     Stroke Father     Other Father         melenoma     Social History     Socioeconomic History    Marital status:      Spouse name: Not on file    Number of children: Not on file    Years of education: Not on file    Highest education level: Not on file   Occupational History    Not on file   Social Needs    Financial resource strain: Not on file    Food insecurity:     Worry: Not on file     Inability: Not on file    Transportation needs:     Medical: Not on file     Non-medical: Not on file   Tobacco Use    Smoking status: Never Smoker    Smokeless tobacco: Never Used   Substance and Sexual Activity    Alcohol use: Yes     Comment: socially    Drug use: No    Sexual activity: Yes     Partners: Male   Lifestyle    Physical activity:     Days per week: Not on file     Minutes per session: Not on file    Stress: Not on file   Relationships    Social connections:     Talks on phone: Not on file     Gets together: Not on file     Attends [employer] service: Not on file     Active member of club or organization: Not on file     Attends meetings of clubs or organizations: Not on file     Relationship status: Not on file    Intimate partner violence:     Fear of current or ex partner: Not on file     Emotionally abused: Not on file     Physically abused: Not on file     Forced sexual activity: Not on file   Other Topics Concern    Not on file   Social History Narrative    Not on file     Allergies:  Sulfamethoxazole-trimethoprim and Sulfamethoxazole-trimethoprim    Review of Systems   Constitutional: Negative for activity change, appetite change, chills, diaphoresis, fatigue, fever and unexpected weight change. HENT: Positive for congestion, hoarse voice, postnasal drip, rhinorrhea, sinus pressure, sneezing and sore throat. Negative for ear pain, facial swelling and trouble swallowing. Eyes: Negative for pain, discharge and visual disturbance. Respiratory: Positive for cough and shortness of breath. Negative for wheezing. Cardiovascular: Negative for chest pain, palpitations and leg swelling. Gastrointestinal: Negative for abdominal pain, diarrhea, nausea and vomiting. Endocrine: Negative. Genitourinary: Negative. Musculoskeletal: Negative. Negative for neck pain. Skin: Negative for rash. Allergic/Immunologic: Positive for environmental allergies. Neurological: Positive for headaches. Negative for dizziness, weakness, light-headedness and numbness. Psychiatric/Behavioral: Negative. Objective:    /70 (Site: Left Upper Arm, Position: Sitting, Cuff Size: Medium Adult)   Pulse 74   Temp 99.1 °F (37.3 °C) (Oral)   Resp 14   Ht 5' 3\" (1.6 m)   Wt 147 lb (66.7 kg)   LMP  (Exact Date)   SpO2 99%   Breastfeeding? No   BMI 26.04 kg/m²     Physical Exam   Constitutional: She is oriented to person, place, and time. She appears well-developed and well-nourished. No distress. HENT:   Head: Normocephalic and atraumatic. Right Ear: Hearing, tympanic membrane, external ear and ear canal normal.   Left Ear: Hearing, tympanic membrane, external ear and ear canal normal.   Nose: Mucosal edema and rhinorrhea present. Right sinus exhibits maxillary sinus tenderness and frontal sinus tenderness. Left sinus exhibits maxillary sinus tenderness and frontal sinus tenderness. Mouth/Throat: Uvula is midline and mucous membranes are normal. Posterior oropharyngeal erythema present.  No oropharyngeal exudate or posterior oropharyngeal edema. Eyes: Conjunctivae are normal. Right eye exhibits no discharge. Left eye exhibits no discharge. Neck: Normal range of motion. Neck supple. Cardiovascular: Normal rate, regular rhythm and normal heart sounds. Pulmonary/Chest: Effort normal. No respiratory distress. She has no decreased breath sounds. She has no wheezes. She has no rhonchi. She has no rales. Abdominal: Soft. Bowel sounds are normal. There is no tenderness. Lymphadenopathy:     She has no cervical adenopathy. Neurological: She is alert and oriented to person, place, and time. Skin: Skin is warm. No rash noted. She is not diaphoretic. Vitals reviewed. Assessment & Plan:       Diagnosis Orders   1. Recurrent sinusitis  amoxicillin-clavulanate (AUGMENTIN) 875-125 MG per tablet    [facility] - Olu Goncalves MD, Otolaryngology, [facility]   2. Mild intermittent asthma without complication  albuterol sulfate  (90 Base) MCG/ACT inhaler   3. Antibiotic-induced yeast infection  fluconazole (DIFLUCAN) 150 MG tablet     Orders Placed This Encounter   Procedures   Yesi Coffman MD, Otolaryngology, [facility]     Referral Priority:   Routine     Referral Type:   Eval and Treat     Referral Reason:   Specialty Services Required     Referred to Provider:   Ankit Nunez MD     Requested Specialty:   Otolaryngology     Number of Visits Requested:   1     Orders Placed This Encounter   Medications    albuterol sulfate  (90 Base) MCG/ACT inhaler     Sig: Inhale 2 puffs into the lungs every 6 hours as needed for Wheezing     Dispense:  1 Inhaler     Refill:  3    amoxicillin-clavulanate (AUGMENTIN) 875-125 MG per tablet     Sig: Take 1 tablet by mouth 2 times daily for 14 days     Dispense:  28 tablet     Refill:  0    methylPREDNISolone (MEDROL, AIDAN,) 4 MG tablet     Sig: Take by mouth.      Dispense:  1 kit     Refill:  0    promethazine-codeine (PHENERGAN WITH CODEINE) 6.25-10 MG/5ML

## 2019-09-04 ENCOUNTER — HOSPITAL ENCOUNTER (OUTPATIENT)
Dept: WOMENS IMAGING | Age: 64
Discharge: HOME | End: 2019-09-06
Payer: COMMERCIAL

## 2019-09-04 DIAGNOSIS — Z12.31 ENCOUNTER FOR SCREENING MAMMOGRAM FOR BREAST CANCER: ICD-10-CM

## 2019-09-04 PROCEDURE — 77063 BREAST TOMOSYNTHESIS BI: CPT

## 2019-09-18 ENCOUNTER — TELEPHONE (OUTPATIENT)
Dept: FAMILY MEDICINE CLINIC | Age: 64
End: 2019-09-18

## 2019-09-18 DIAGNOSIS — L98.9 SKIN LESION: Primary | ICD-10-CM

## 2019-09-19 ENCOUNTER — OFFICE VISIT (OUTPATIENT)
Dept: OBGYN CLINIC | Age: 64
End: 2019-09-19
Payer: COMMERCIAL

## 2019-09-19 VITALS
SYSTOLIC BLOOD PRESSURE: 118 MMHG | HEIGHT: 62 IN | DIASTOLIC BLOOD PRESSURE: 68 MMHG | BODY MASS INDEX: 26.68 KG/M2 | WEIGHT: 145 LBS

## 2019-09-19 DIAGNOSIS — Z01.419 WOMEN'S ANNUAL ROUTINE GYNECOLOGICAL EXAMINATION: ICD-10-CM

## 2019-09-19 DIAGNOSIS — N36.41 URETHRAL HYPERMOBILITY: ICD-10-CM

## 2019-09-19 DIAGNOSIS — N39.3 SUI (STRESS URINARY INCONTINENCE, FEMALE): ICD-10-CM

## 2019-09-19 DIAGNOSIS — Z11.51 ENCOUNTER FOR SCREENING FOR HUMAN PAPILLOMAVIRUS (HPV): ICD-10-CM

## 2019-09-19 DIAGNOSIS — Z11.51 ENCOUNTER FOR SCREENING FOR HUMAN PAPILLOMAVIRUS (HPV): Primary | ICD-10-CM

## 2019-09-19 PROCEDURE — 99396 PREV VISIT EST AGE 40-64: CPT | Performed by: OBSTETRICS & GYNECOLOGY

## 2019-09-19 ASSESSMENT — ENCOUNTER SYMPTOMS
VOMITING: 0
COUGH: 0
VOICE CHANGE: 0
SHORTNESS OF BREATH: 0
COLOR CHANGE: 0
BACK PAIN: 0
ABDOMINAL DISTENTION: 0
WHEEZING: 0
CHEST TIGHTNESS: 0
ABDOMINAL PAIN: 0
CONSTIPATION: 0
NAUSEA: 0
SORE THROAT: 0
TROUBLE SWALLOWING: 0
BLOOD IN STOOL: 0

## 2019-09-19 NOTE — PROGRESS NOTES
Negative for activity change, appetite change, fatigue and unexpected weight change. HENT: Negative for dental problem, ear pain, hearing loss, nosebleeds, sore throat, trouble swallowing and voice change. Eyes: Negative for visual disturbance. Respiratory: Negative for cough, chest tightness, shortness of breath and wheezing. Cardiovascular: Negative for chest pain and palpitations. Gastrointestinal: Negative for abdominal distention, abdominal pain, blood in stool, constipation, nausea and vomiting. Endocrine: Negative for cold intolerance, heat intolerance, polydipsia, polyphagia and polyuria. Genitourinary: Positive for difficulty urinating. Negative for dyspareunia, dysuria, flank pain, frequency, genital sores, hematuria, menstrual problem, pelvic pain, urgency, vaginal bleeding, vaginal discharge and vaginal pain. Musculoskeletal: Negative for arthralgias, back pain, joint swelling and myalgias. Skin: Negative for color change and rash. Allergic/Immunologic: Negative for environmental allergies, food allergies and immunocompromised state. Neurological: Negative for dizziness, seizures, syncope, speech difficulty, weakness, numbness and headaches. Hematological: Negative for adenopathy. Does not bruise/bleed easily. Psychiatric/Behavioral: Negative for agitation, behavioral problems, confusion, decreased concentration, dysphoric mood and suicidal ideas. The patient is not nervous/anxious and is not hyperactive. All other systems reviewed and are negative. PHYSICAL EXAM  /68   Ht 5' 2\" (1.575 m)   Wt 145 lb (65.8 kg)   LMP  (Exact Date)   BMI 26.52 kg/m²      Physical Exam   Constitutional: She is oriented to person, place, and time. Vital signs are normal. She appears well-developed and well-nourished. HENT:   Head: Normocephalic and atraumatic. Eyes: Pupils are equal, round, and reactive to light. Neck: Normal range of motion. No tracheal deviation present. No thyromegaly present. Cardiovascular: Normal rate, regular rhythm and normal heart sounds. Pulmonary/Chest: Effort normal and breath sounds normal. She exhibits no tenderness. No breast tenderness or discharge. Abdominal: Soft. Bowel sounds are normal. She exhibits no distension and no mass. There is no tenderness. There is no rebound and no guarding. Hernia confirmed negative in the left inguinal area. Genitourinary: Rectum normal and vagina normal. Rectal exam shows no external hemorrhoid, no internal hemorrhoid, no fissure, no mass, no tenderness and anal tone normal.       No labial fusion. There is no rash, tenderness, lesion or injury on the right labia. There is no rash, tenderness, lesion or injury on the left labia. Right adnexum displays no mass, no tenderness and no fullness. Left adnexum displays no mass, no tenderness and no fullness. No erythema, tenderness or bleeding in the vagina. No foreign body in the vagina. No vaginal discharge found. Musculoskeletal: Normal range of motion. Lymphadenopathy:     She has no cervical adenopathy. Right: No inguinal adenopathy present. Left: No inguinal adenopathy present. Neurological: She is alert and oriented to person, place, and time. ASSESSMENT : Routine Annual Exam      Diagnosis Orders   1. Encounter for screening for human papillomavirus (HPV)  PAP SMEAR   2. Women's annual routine gynecological examination  PAP SMEAR   3. JOSUÉ (stress urinary incontinence, female)     4. Urethral hypermobility         PLAN: Mammogram recently done. Just intermittent use of a pessary versus TVT O versus more frequent voiding. Will decide and let me know if she wants to do. Pap smear obtained . continue using the estradiol.   Follow-up in 1 year or as needed  Orders Placed This Encounter   Procedures    PAP SMEAR     Standing Status:   Future     Standing Expiration Date:   9/18/2020     Order Specific Question:   Collection Type

## 2020-02-03 ENCOUNTER — HOSPITAL ENCOUNTER (INPATIENT)
Age: 65
LOS: 1 days | Discharge: HOME | DRG: 337 | End: 2020-02-04
Attending: STUDENT IN AN ORGANIZED HEALTH CARE EDUCATION/TRAINING PROGRAM | Admitting: COLON & RECTAL SURGERY
Payer: COMMERCIAL

## 2020-02-03 ENCOUNTER — APPOINTMENT (OUTPATIENT)
Dept: CT IMAGING | Age: 65
DRG: 337 | End: 2020-02-03
Payer: COMMERCIAL

## 2020-02-03 ENCOUNTER — OFFICE VISIT (OUTPATIENT)
Dept: FAMILY MEDICINE CLINIC | Age: 65
End: 2020-02-03
Payer: COMMERCIAL

## 2020-02-03 VITALS
HEART RATE: 86 BPM | OXYGEN SATURATION: 99 % | WEIGHT: 142 LBS | BODY MASS INDEX: 26.13 KG/M2 | RESPIRATION RATE: 16 BRPM | TEMPERATURE: 98.6 F | HEIGHT: 62 IN | SYSTOLIC BLOOD PRESSURE: 134 MMHG | DIASTOLIC BLOOD PRESSURE: 86 MMHG

## 2020-02-03 LAB
ALBUMIN SERPL-MCNC: 4.6 G/DL (ref 3.5–4.6)
ALP BLD-CCNC: 64 U/L (ref 40–130)
ALT SERPL-CCNC: 11 U/L (ref 0–33)
AMYLASE: 42 U/L (ref 22–93)
ANION GAP SERPL CALCULATED.3IONS-SCNC: 13 MEQ/L (ref 9–15)
AST SERPL-CCNC: 18 U/L (ref 0–35)
BACTERIA: NEGATIVE /HPF
BASOPHILS ABSOLUTE: 0.1 K/UL (ref 0–0.2)
BASOPHILS RELATIVE PERCENT: 0.5 %
BILIRUB SERPL-MCNC: 0.4 MG/DL (ref 0.2–0.7)
BILIRUBIN URINE: NEGATIVE
BLOOD, URINE: NEGATIVE
BUN BLDV-MCNC: 10 MG/DL (ref 8–23)
CALCIUM SERPL-MCNC: 9.1 MG/DL (ref 8.5–9.9)
CHLORIDE BLD-SCNC: 97 MEQ/L (ref 95–107)
CLARITY: CLEAR
CO2: 25 MEQ/L (ref 20–31)
COLOR: YELLOW
CREAT SERPL-MCNC: 0.55 MG/DL (ref 0.5–0.9)
EOSINOPHILS ABSOLUTE: 0 K/UL (ref 0–0.7)
EOSINOPHILS RELATIVE PERCENT: 0.2 %
EPITHELIAL CELLS, UA: ABNORMAL /HPF (ref 0–5)
GFR AFRICAN AMERICAN: >60
GFR AFRICAN AMERICAN: >60
GFR NON-AFRICAN AMERICAN: >60
GFR NON-AFRICAN AMERICAN: >60
GLOBULIN: 3.1 G/DL (ref 2.3–3.5)
GLUCOSE BLD-MCNC: 100 MG/DL (ref 70–99)
GLUCOSE URINE: NEGATIVE MG/DL
HCT VFR BLD CALC: 42.1 % (ref 37–47)
HEMOGLOBIN: 14.1 G/DL (ref 12–16)
HYALINE CASTS: ABNORMAL /HPF (ref 0–5)
KETONES, URINE: NEGATIVE MG/DL
LACTIC ACID: 1.7 MMOL/L (ref 0.5–2.2)
LEUKOCYTE ESTERASE, URINE: ABNORMAL
LIPASE: 23 U/L (ref 12–95)
LYMPHOCYTES ABSOLUTE: 0.9 K/UL (ref 1–4.8)
LYMPHOCYTES RELATIVE PERCENT: 7.3 %
MCH RBC QN AUTO: 31.4 PG (ref 27–31.3)
MCHC RBC AUTO-ENTMCNC: 33.4 % (ref 33–37)
MCV RBC AUTO: 94 FL (ref 82–100)
MONOCYTES ABSOLUTE: 0.5 K/UL (ref 0.2–0.8)
MONOCYTES RELATIVE PERCENT: 3.6 %
NEUTROPHILS ABSOLUTE: 11.4 K/UL (ref 1.4–6.5)
NEUTROPHILS RELATIVE PERCENT: 88.4 %
NITRITE, URINE: NEGATIVE
PDW BLD-RTO: 13.6 % (ref 11.5–14.5)
PERFORMED ON: NORMAL
PH UA: 8.5 (ref 5–9)
PLATELET # BLD: 216 K/UL (ref 130–400)
POC CREATININE WHOLE BLOOD: 0.6
POC CREATININE: 0.6 MG/DL (ref 0.6–1.2)
POC SAMPLE TYPE: NORMAL
POTASSIUM SERPL-SCNC: 4.1 MEQ/L (ref 3.4–4.9)
PROTEIN UA: NEGATIVE MG/DL
RBC # BLD: 4.48 M/UL (ref 4.2–5.4)
RBC UA: ABNORMAL /HPF (ref 0–5)
SODIUM BLD-SCNC: 135 MEQ/L (ref 135–144)
SPECIFIC GRAVITY UA: 1.01 (ref 1–1.03)
TOTAL PROTEIN: 7.7 G/DL (ref 6.3–8)
URINE REFLEX TO CULTURE: YES
UROBILINOGEN, URINE: 0.2 E.U./DL
WBC # BLD: 12.9 K/UL (ref 4.8–10.8)
WBC UA: ABNORMAL /HPF (ref 0–5)

## 2020-02-03 PROCEDURE — 83605 ASSAY OF LACTIC ACID: CPT

## 2020-02-03 PROCEDURE — 94664 DEMO&/EVAL PT USE INHALER: CPT

## 2020-02-03 PROCEDURE — G8427 DOCREV CUR MEDS BY ELIG CLIN: HCPCS | Performed by: NURSE PRACTITIONER

## 2020-02-03 PROCEDURE — 82150 ASSAY OF AMYLASE: CPT

## 2020-02-03 PROCEDURE — 1210000000 HC MED SURG R&B

## 2020-02-03 PROCEDURE — 6360000002 HC RX W HCPCS: Performed by: NURSE PRACTITIONER

## 2020-02-03 PROCEDURE — 36415 COLL VENOUS BLD VENIPUNCTURE: CPT

## 2020-02-03 PROCEDURE — 2580000003 HC RX 258: Performed by: NURSE PRACTITIONER

## 2020-02-03 PROCEDURE — 1036F TOBACCO NON-USER: CPT | Performed by: NURSE PRACTITIONER

## 2020-02-03 PROCEDURE — 6360000004 HC RX CONTRAST MEDICATION: Performed by: NURSE PRACTITIONER

## 2020-02-03 PROCEDURE — G8419 CALC BMI OUT NRM PARAM NOF/U: HCPCS | Performed by: NURSE PRACTITIONER

## 2020-02-03 PROCEDURE — 74177 CT ABD & PELVIS W/CONTRAST: CPT

## 2020-02-03 PROCEDURE — 85025 COMPLETE CBC W/AUTO DIFF WBC: CPT

## 2020-02-03 PROCEDURE — 81001 URINALYSIS AUTO W/SCOPE: CPT

## 2020-02-03 PROCEDURE — 96361 HYDRATE IV INFUSION ADD-ON: CPT

## 2020-02-03 PROCEDURE — 87040 BLOOD CULTURE FOR BACTERIA: CPT

## 2020-02-03 PROCEDURE — G8482 FLU IMMUNIZE ORDER/ADMIN: HCPCS | Performed by: NURSE PRACTITIONER

## 2020-02-03 PROCEDURE — 80053 COMPREHEN METABOLIC PANEL: CPT

## 2020-02-03 PROCEDURE — 6370000000 HC RX 637 (ALT 250 FOR IP): Performed by: COLON & RECTAL SURGERY

## 2020-02-03 PROCEDURE — 83690 ASSAY OF LIPASE: CPT

## 2020-02-03 PROCEDURE — 99212 OFFICE O/P EST SF 10 MIN: CPT | Performed by: NURSE PRACTITIONER

## 2020-02-03 PROCEDURE — 3017F COLORECTAL CA SCREEN DOC REV: CPT | Performed by: NURSE PRACTITIONER

## 2020-02-03 PROCEDURE — 96374 THER/PROPH/DIAG INJ IV PUSH: CPT

## 2020-02-03 PROCEDURE — 96375 TX/PRO/DX INJ NEW DRUG ADDON: CPT

## 2020-02-03 PROCEDURE — 87086 URINE CULTURE/COLONY COUNT: CPT

## 2020-02-03 PROCEDURE — 2580000003 HC RX 258: Performed by: COLON & RECTAL SURGERY

## 2020-02-03 PROCEDURE — 99285 EMERGENCY DEPT VISIT HI MDM: CPT

## 2020-02-03 RX ORDER — CALCIUM CARBONATE 500(1250)
500 TABLET ORAL DAILY
Status: DISCONTINUED | OUTPATIENT
Start: 2020-02-04 | End: 2020-02-04 | Stop reason: HOSPADM

## 2020-02-03 RX ORDER — 0.9 % SODIUM CHLORIDE 0.9 %
1000 INTRAVENOUS SOLUTION INTRAVENOUS ONCE
Status: COMPLETED | OUTPATIENT
Start: 2020-02-03 | End: 2020-02-03

## 2020-02-03 RX ORDER — METRONIDAZOLE 7.5 MG/G
GEL TOPICAL
Status: ON HOLD | COMMUNITY
Start: 2020-01-22 | End: 2021-03-03

## 2020-02-03 RX ORDER — FENTANYL CITRATE 50 UG/ML
50 INJECTION, SOLUTION INTRAMUSCULAR; INTRAVENOUS
Status: DISCONTINUED | OUTPATIENT
Start: 2020-02-03 | End: 2020-02-03 | Stop reason: HOSPADM

## 2020-02-03 RX ORDER — FENTANYL CITRATE 50 UG/ML
25 INJECTION, SOLUTION INTRAMUSCULAR; INTRAVENOUS
Status: DISCONTINUED | OUTPATIENT
Start: 2020-02-03 | End: 2020-02-03 | Stop reason: HOSPADM

## 2020-02-03 RX ORDER — VITAMIN B COMPLEX
2000 TABLET ORAL DAILY
Status: DISCONTINUED | OUTPATIENT
Start: 2020-02-04 | End: 2020-02-04 | Stop reason: HOSPADM

## 2020-02-03 RX ORDER — SODIUM CHLORIDE 0.9 % (FLUSH) 0.9 %
10 SYRINGE (ML) INJECTION EVERY 12 HOURS SCHEDULED
Status: DISCONTINUED | OUTPATIENT
Start: 2020-02-03 | End: 2020-02-04 | Stop reason: HOSPADM

## 2020-02-03 RX ORDER — ALBUTEROL SULFATE 90 UG/1
2 AEROSOL, METERED RESPIRATORY (INHALATION) EVERY 6 HOURS PRN
Status: DISCONTINUED | OUTPATIENT
Start: 2020-02-03 | End: 2020-02-04 | Stop reason: HOSPADM

## 2020-02-03 RX ORDER — ONDANSETRON 2 MG/ML
4 INJECTION INTRAMUSCULAR; INTRAVENOUS EVERY 10 MIN PRN
Status: DISCONTINUED | OUTPATIENT
Start: 2020-02-03 | End: 2020-02-03

## 2020-02-03 RX ORDER — ALBUTEROL SULFATE 90 UG/1
2 AEROSOL, METERED RESPIRATORY (INHALATION) EVERY 6 HOURS PRN
Qty: 1 INHALER | Refills: 3 | Status: SHIPPED | OUTPATIENT
Start: 2020-02-03 | End: 2021-12-03 | Stop reason: SDUPTHER

## 2020-02-03 RX ORDER — MORPHINE SULFATE 2 MG/ML
4 INJECTION, SOLUTION INTRAMUSCULAR; INTRAVENOUS EVERY 30 MIN PRN
Status: DISCONTINUED | OUTPATIENT
Start: 2020-02-03 | End: 2020-02-04

## 2020-02-03 RX ORDER — SODIUM CHLORIDE 0.9 % (FLUSH) 0.9 %
10 SYRINGE (ML) INJECTION PRN
Status: DISCONTINUED | OUTPATIENT
Start: 2020-02-03 | End: 2020-02-04 | Stop reason: HOSPADM

## 2020-02-03 RX ORDER — METRONIDAZOLE 7.5 MG/G
GEL TOPICAL 2 TIMES DAILY
Status: DISCONTINUED | OUTPATIENT
Start: 2020-02-03 | End: 2020-02-04 | Stop reason: HOSPADM

## 2020-02-03 RX ORDER — SODIUM CHLORIDE 9 MG/ML
INJECTION, SOLUTION INTRAVENOUS CONTINUOUS
Status: DISCONTINUED | OUTPATIENT
Start: 2020-02-03 | End: 2020-02-04

## 2020-02-03 RX ORDER — ONDANSETRON 2 MG/ML
4 INJECTION INTRAMUSCULAR; INTRAVENOUS EVERY 6 HOURS PRN
Status: DISCONTINUED | OUTPATIENT
Start: 2020-02-03 | End: 2020-02-04 | Stop reason: HOSPADM

## 2020-02-03 RX ORDER — ACETAMINOPHEN 325 MG/1
650 TABLET ORAL EVERY 4 HOURS PRN
Status: DISCONTINUED | OUTPATIENT
Start: 2020-02-03 | End: 2020-02-04 | Stop reason: HOSPADM

## 2020-02-03 RX ORDER — KETOROLAC TROMETHAMINE 30 MG/ML
30 INJECTION, SOLUTION INTRAMUSCULAR; INTRAVENOUS ONCE
Status: COMPLETED | OUTPATIENT
Start: 2020-02-03 | End: 2020-02-03

## 2020-02-03 RX ADMIN — ONDANSETRON 4 MG: 2 INJECTION INTRAMUSCULAR; INTRAVENOUS at 17:35

## 2020-02-03 RX ADMIN — Medication 10 ML: at 22:36

## 2020-02-03 RX ADMIN — MORPHINE SULFATE 2 MG: 2 INJECTION, SOLUTION INTRAMUSCULAR; INTRAVENOUS at 17:35

## 2020-02-03 RX ADMIN — SODIUM CHLORIDE 1000 ML: 9 INJECTION, SOLUTION INTRAVENOUS at 17:34

## 2020-02-03 RX ADMIN — ACETAMINOPHEN 650 MG: 325 TABLET ORAL at 22:36

## 2020-02-03 RX ADMIN — KETOROLAC TROMETHAMINE 30 MG: 30 INJECTION, SOLUTION INTRAMUSCULAR at 17:35

## 2020-02-03 RX ADMIN — IOPAMIDOL 100 ML: 755 INJECTION, SOLUTION INTRAVENOUS at 18:06

## 2020-02-03 RX ADMIN — SODIUM CHLORIDE: 9 INJECTION, SOLUTION INTRAVENOUS at 22:36

## 2020-02-03 RX ADMIN — PIPERACILLIN AND TAZOBACTAM 3.38 G: 3; .375 INJECTION, POWDER, LYOPHILIZED, FOR SOLUTION INTRAVENOUS at 18:31

## 2020-02-03 ASSESSMENT — PAIN DESCRIPTION - DESCRIPTORS
DESCRIPTORS: BURNING
DESCRIPTORS: BURNING

## 2020-02-03 ASSESSMENT — ENCOUNTER SYMPTOMS
SHORTNESS OF BREATH: 0
DIARRHEA: 0
CONSTIPATION: 0
PHOTOPHOBIA: 0
COUGH: 0
BACK PAIN: 0
FLATUS: 0
BELCHING: 0
ABDOMINAL PAIN: 1
DIARRHEA: 0
VOMITING: 0
ABDOMINAL PAIN: 1
SHORTNESS OF BREATH: 0
NAUSEA: 0
NAUSEA: 1
COUGH: 0
WHEEZING: 0
EYE PAIN: 0
HEMATOCHEZIA: 0
SORE THROAT: 0
VOMITING: 0
RHINORRHEA: 0

## 2020-02-03 ASSESSMENT — PAIN SCALES - GENERAL
PAINLEVEL_OUTOF10: 3
PAINLEVEL_OUTOF10: 5
PAINLEVEL_OUTOF10: 2

## 2020-02-03 ASSESSMENT — PAIN DESCRIPTION - ORIENTATION
ORIENTATION: RIGHT;LOWER
ORIENTATION: RIGHT;LOWER

## 2020-02-03 ASSESSMENT — PAIN DESCRIPTION - PAIN TYPE
TYPE: ACUTE PAIN
TYPE: ACUTE PAIN

## 2020-02-03 ASSESSMENT — PATIENT HEALTH QUESTIONNAIRE - PHQ9
2. FEELING DOWN, DEPRESSED OR HOPELESS: 0
SUM OF ALL RESPONSES TO PHQ QUESTIONS 1-9: 0
1. LITTLE INTEREST OR PLEASURE IN DOING THINGS: 0
SUM OF ALL RESPONSES TO PHQ9 QUESTIONS 1 & 2: 0
SUM OF ALL RESPONSES TO PHQ QUESTIONS 1-9: 0

## 2020-02-03 ASSESSMENT — PAIN DESCRIPTION - LOCATION
LOCATION: ABDOMEN
LOCATION: ABDOMEN

## 2020-02-03 NOTE — PROGRESS NOTES
N/A 10/3/2018    TRANSVAGINAL RECTOCELE REPAIR and Diagnostic Laproscopy performed by Jeannie Amor MD at [address] [facility]      child   [facility] ENDOSCOPY  11/5/15     DR. Anthony Winslow         Family History   Problem Relation Age of Onset    Arthritis Mother         RA   [place] Asthma Mother     Kidney Disease Mother     Arthritis Father         RA    Cancer Father         prostate    Heart Disease Father     High Blood Pressure Father     High Cholesterol Father     Stroke Father     Other Father         melenoma    Breast Cancer Neg Hx      Social History     Socioeconomic History    Marital status:      Spouse name: Not on file    Number of children: Not on file    Years of education: Not on file    Highest education level: Not on file   Occupational History    Not on file   Social Needs    Financial resource strain: Not on file    Food insecurity:     Worry: Not on file     Inability: Not on file    Transportation needs:     Medical: Not on file     Non-medical: Not on file   Tobacco Use    Smoking status: Never Smoker    Smokeless tobacco: Never Used   Substance and Sexual Activity    Alcohol use: Yes     Comment: socially    Drug use: No    Sexual activity: Yes     Partners: Male   Lifestyle    Physical activity:     Days per week: Not on file     Minutes per session: Not on file    Stress: Not on file   Relationships    Social connections:     Talks on phone: Not on file     Gets together: Not on file     Attends Latter day service: Not on file     Active member of club or organization: Not on file     Attends meetings of clubs or organizations: Not on file     Relationship status: Not on file    Intimate partner violence:     Fear of current or ex partner: Not on file     Emotionally abused: Not on file     Physically abused: Not on file     Forced sexual activity: Not on file   Other Topics Concern    Not on file   Social History Narrative    Not on This Encounter   Medications    albuterol sulfate  (90 Base) MCG/ACT inhaler     Sig: Inhale 2 puffs into the lungs every 6 hours as needed for Wheezing     Dispense:  1 Inhaler     Refill:  3     Medications Discontinued During This Encounter   Medication Reason    estradiol (CLIMARA) 0.1 MG/24HR     methylPREDNISolone (MEDROL, AIDAN,) 4 MG tablet     albuterol sulfate  (90 Base) MCG/ACT inhaler REORDER      to transport to ER to r/o appendicitis  Stable upon leaving office    Return for f/u after ER evaluation. Reviewed with the patient: currentclinical status, medications, activities and diet. Side effects, adverse effects of the medicationprescribed today, as well as treatment plan/ rationale and result expectations havebeen discussed with the patient who expresses understanding and desires to proceed. Pt instructions reviewed and given to patient.     Close follow up to evaluate treatment resultsand for coordination of care. I have reviewed the patient's medical historyin detail and updated the computerized patient record.     Calista Galeazzi, APRN - CNP

## 2020-02-03 NOTE — ED PROVIDER NOTES
Response: Oriented  Best Motor Response: Obeys commands  [place] Coma Scale Score: 15        PHYSICAL EXAM    (up to 7 for level 4, 8 or more for level 5)     ED Triage Vitals   BP Temp Temp Source Pulse Resp SpO2 Height Weight   02/03/20 1628 02/03/20 1627 02/03/20 1627 02/03/20 1627 02/03/20 1627 02/03/20 1627 02/03/20 1627 02/03/20 1627   127/70 98.6 °F (37 °C) Oral 102 18 98 % 5' 3\" (1.6 m) 142 lb (64.4 kg)       Physical Exam  Vitals signs and nursing note reviewed. Constitutional:       General: She is not in acute distress. Appearance: Normal appearance. She is well-developed. She is not diaphoretic. HENT:      Head: Normocephalic and atraumatic. Eyes:      General: Lids are normal.      Conjunctiva/sclera: Conjunctivae normal.   Neck:      Musculoskeletal: Normal range of motion and neck supple. Cardiovascular:      Rate and Rhythm: Normal rate and regular rhythm. Pulses: Normal pulses. Heart sounds: Normal heart sounds. Pulmonary:      Effort: Pulmonary effort is normal.      Breath sounds: Normal breath sounds. Abdominal:      General: Bowel sounds are normal.      Palpations: Abdomen is soft. Tenderness: There is abdominal tenderness in the right lower quadrant. There is no right CVA tenderness or left CVA tenderness. Hernia: No hernia is present. Lymphadenopathy:      Cervical: No cervical adenopathy. Skin:     General: Skin is warm and dry. Capillary Refill: Capillary refill takes less than 2 seconds. Findings: No rash. Neurological:      Mental Status: She is alert and oriented to person, place, and time. Psychiatric:         Thought Content:  Thought content normal.         Judgment: Judgment normal.         RESULTS     EKG: All EKG's are interpreted by the Emergency Department Physician who either signs or Co-signsthis chart in the absence of a cardiologist.        RADIOLOGY:   Non-plain filmimages such as CT, Ultrasound and MRI are read by the

## 2020-02-04 ENCOUNTER — ANESTHESIA (OUTPATIENT)
Dept: OPERATING ROOM | Age: 65
DRG: 337 | End: 2020-02-04
Payer: COMMERCIAL

## 2020-02-04 ENCOUNTER — ANESTHESIA EVENT (OUTPATIENT)
Dept: OPERATING ROOM | Age: 65
DRG: 337 | End: 2020-02-04
Payer: COMMERCIAL

## 2020-02-04 VITALS
OXYGEN SATURATION: 96 % | HEART RATE: 74 BPM | DIASTOLIC BLOOD PRESSURE: 54 MMHG | WEIGHT: 143.08 LBS | HEIGHT: 63 IN | BODY MASS INDEX: 25.35 KG/M2 | SYSTOLIC BLOOD PRESSURE: 111 MMHG | RESPIRATION RATE: 19 BRPM | TEMPERATURE: 99.7 F

## 2020-02-04 VITALS — DIASTOLIC BLOOD PRESSURE: 63 MMHG | OXYGEN SATURATION: 100 % | TEMPERATURE: 98.2 F | SYSTOLIC BLOOD PRESSURE: 111 MMHG

## 2020-02-04 LAB
ANION GAP SERPL CALCULATED.3IONS-SCNC: 14 MEQ/L (ref 9–15)
BASOPHILS ABSOLUTE: 0.1 K/UL (ref 0–0.2)
BASOPHILS RELATIVE PERCENT: 0.4 %
BUN BLDV-MCNC: 8 MG/DL (ref 8–23)
CALCIUM SERPL-MCNC: 8.4 MG/DL (ref 8.5–9.9)
CHLORIDE BLD-SCNC: 102 MEQ/L (ref 95–107)
CO2: 22 MEQ/L (ref 20–31)
CREAT SERPL-MCNC: 0.65 MG/DL (ref 0.5–0.9)
EOSINOPHILS ABSOLUTE: 0 K/UL (ref 0–0.7)
EOSINOPHILS RELATIVE PERCENT: 0.2 %
GFR AFRICAN AMERICAN: >60
GFR NON-AFRICAN AMERICAN: >60
GLUCOSE BLD-MCNC: 115 MG/DL (ref 70–99)
HCT VFR BLD CALC: 39.4 % (ref 37–47)
HEMOGLOBIN: 13.2 G/DL (ref 12–16)
LYMPHOCYTES ABSOLUTE: 1.1 K/UL (ref 1–4.8)
LYMPHOCYTES RELATIVE PERCENT: 8.3 %
MCH RBC QN AUTO: 31.4 PG (ref 27–31.3)
MCHC RBC AUTO-ENTMCNC: 33.4 % (ref 33–37)
MCV RBC AUTO: 94.1 FL (ref 82–100)
MONOCYTES ABSOLUTE: 0.7 K/UL (ref 0.2–0.8)
MONOCYTES RELATIVE PERCENT: 5.3 %
NEUTROPHILS ABSOLUTE: 11.4 K/UL (ref 1.4–6.5)
NEUTROPHILS RELATIVE PERCENT: 85.8 %
PDW BLD-RTO: 13.4 % (ref 11.5–14.5)
PLATELET # BLD: 204 K/UL (ref 130–400)
POTASSIUM SERPL-SCNC: 3.6 MEQ/L (ref 3.4–4.9)
RBC # BLD: 4.18 M/UL (ref 4.2–5.4)
SODIUM BLD-SCNC: 138 MEQ/L (ref 135–144)
WBC # BLD: 13.3 K/UL (ref 4.8–10.8)

## 2020-02-04 PROCEDURE — 36415 COLL VENOUS BLD VENIPUNCTURE: CPT

## 2020-02-04 PROCEDURE — 6360000002 HC RX W HCPCS: Performed by: NURSE ANESTHETIST, CERTIFIED REGISTERED

## 2020-02-04 PROCEDURE — 6360000002 HC RX W HCPCS: Performed by: STUDENT IN AN ORGANIZED HEALTH CARE EDUCATION/TRAINING PROGRAM

## 2020-02-04 PROCEDURE — 99221 1ST HOSP IP/OBS SF/LOW 40: CPT | Performed by: COLON & RECTAL SURGERY

## 2020-02-04 PROCEDURE — 2720000010 HC SURG SUPPLY STERILE: Performed by: COLON & RECTAL SURGERY

## 2020-02-04 PROCEDURE — 2709999900 HC NON-CHARGEABLE SUPPLY: Performed by: COLON & RECTAL SURGERY

## 2020-02-04 PROCEDURE — 2500000003 HC RX 250 WO HCPCS: Performed by: NURSE ANESTHETIST, CERTIFIED REGISTERED

## 2020-02-04 PROCEDURE — 88304 TISSUE EXAM BY PATHOLOGIST: CPT

## 2020-02-04 PROCEDURE — 80048 BASIC METABOLIC PNL TOTAL CA: CPT

## 2020-02-04 PROCEDURE — 2580000003 HC RX 258: Performed by: COLON & RECTAL SURGERY

## 2020-02-04 PROCEDURE — 2580000003 HC RX 258: Performed by: INTERNAL MEDICINE

## 2020-02-04 PROCEDURE — 6370000000 HC RX 637 (ALT 250 FOR IP): Performed by: NURSE PRACTITIONER

## 2020-02-04 PROCEDURE — 6360000002 HC RX W HCPCS: Performed by: COLON & RECTAL SURGERY

## 2020-02-04 PROCEDURE — 6370000000 HC RX 637 (ALT 250 FOR IP): Performed by: COLON & RECTAL SURGERY

## 2020-02-04 PROCEDURE — 7100000001 HC PACU RECOVERY - ADDTL 15 MIN: Performed by: COLON & RECTAL SURGERY

## 2020-02-04 PROCEDURE — 6370000000 HC RX 637 (ALT 250 FOR IP)

## 2020-02-04 PROCEDURE — 0DTJ4ZZ RESECTION OF APPENDIX, PERCUTANEOUS ENDOSCOPIC APPROACH: ICD-10-PCS | Performed by: COLON & RECTAL SURGERY

## 2020-02-04 PROCEDURE — 85025 COMPLETE CBC W/AUTO DIFF WBC: CPT

## 2020-02-04 PROCEDURE — 3700000000 HC ANESTHESIA ATTENDED CARE: Performed by: COLON & RECTAL SURGERY

## 2020-02-04 PROCEDURE — 64488 TAP BLOCK BI INJECTION: CPT | Performed by: STUDENT IN AN ORGANIZED HEALTH CARE EDUCATION/TRAINING PROGRAM

## 2020-02-04 PROCEDURE — 3700000001 HC ADD 15 MINUTES (ANESTHESIA): Performed by: COLON & RECTAL SURGERY

## 2020-02-04 PROCEDURE — 3600000004 HC SURGERY LEVEL 4 BASE: Performed by: COLON & RECTAL SURGERY

## 2020-02-04 PROCEDURE — 0DNW4ZZ RELEASE PERITONEUM, PERCUTANEOUS ENDOSCOPIC APPROACH: ICD-10-PCS | Performed by: COLON & RECTAL SURGERY

## 2020-02-04 PROCEDURE — 44970 LAPAROSCOPY APPENDECTOMY: CPT | Performed by: COLON & RECTAL SURGERY

## 2020-02-04 PROCEDURE — 6360000002 HC RX W HCPCS: Performed by: INTERNAL MEDICINE

## 2020-02-04 PROCEDURE — 3E0T3BZ INTRODUCTION OF ANESTHETIC AGENT INTO PERIPHERAL NERVES AND PLEXI, PERCUTANEOUS APPROACH: ICD-10-PCS | Performed by: STUDENT IN AN ORGANIZED HEALTH CARE EDUCATION/TRAINING PROGRAM

## 2020-02-04 PROCEDURE — 2500000003 HC RX 250 WO HCPCS: Performed by: STUDENT IN AN ORGANIZED HEALTH CARE EDUCATION/TRAINING PROGRAM

## 2020-02-04 PROCEDURE — 3600000014 HC SURGERY LEVEL 4 ADDTL 15MIN: Performed by: COLON & RECTAL SURGERY

## 2020-02-04 PROCEDURE — 7100000000 HC PACU RECOVERY - FIRST 15 MIN: Performed by: COLON & RECTAL SURGERY

## 2020-02-04 PROCEDURE — 87040 BLOOD CULTURE FOR BACTERIA: CPT

## 2020-02-04 PROCEDURE — 2580000003 HC RX 258: Performed by: NURSE ANESTHETIST, CERTIFIED REGISTERED

## 2020-02-04 RX ORDER — DIPHENHYDRAMINE HYDROCHLORIDE 50 MG/ML
12.5 INJECTION INTRAMUSCULAR; INTRAVENOUS
Status: DISCONTINUED | OUTPATIENT
Start: 2020-02-04 | End: 2020-02-04 | Stop reason: HOSPADM

## 2020-02-04 RX ORDER — KETOROLAC TROMETHAMINE 30 MG/ML
30 INJECTION, SOLUTION INTRAMUSCULAR; INTRAVENOUS EVERY 6 HOURS PRN
Status: DISCONTINUED | OUTPATIENT
Start: 2020-02-04 | End: 2020-02-04 | Stop reason: HOSPADM

## 2020-02-04 RX ORDER — ONDANSETRON 2 MG/ML
4 INJECTION INTRAMUSCULAR; INTRAVENOUS EVERY 6 HOURS PRN
Status: DISCONTINUED | OUTPATIENT
Start: 2020-02-04 | End: 2020-02-04 | Stop reason: HOSPADM

## 2020-02-04 RX ORDER — METOCLOPRAMIDE HYDROCHLORIDE 5 MG/ML
10 INJECTION INTRAMUSCULAR; INTRAVENOUS
Status: DISCONTINUED | OUTPATIENT
Start: 2020-02-04 | End: 2020-02-04 | Stop reason: HOSPADM

## 2020-02-04 RX ORDER — ONDANSETRON 2 MG/ML
INJECTION INTRAMUSCULAR; INTRAVENOUS PRN
Status: DISCONTINUED | OUTPATIENT
Start: 2020-02-04 | End: 2020-02-04 | Stop reason: SDUPTHER

## 2020-02-04 RX ORDER — HYDROCODONE BITARTRATE AND ACETAMINOPHEN 5; 325 MG/1; MG/1
2 TABLET ORAL PRN
Status: COMPLETED | OUTPATIENT
Start: 2020-02-04 | End: 2020-02-04

## 2020-02-04 RX ORDER — FENTANYL CITRATE 50 UG/ML
INJECTION, SOLUTION INTRAMUSCULAR; INTRAVENOUS PRN
Status: DISCONTINUED | OUTPATIENT
Start: 2020-02-04 | End: 2020-02-04 | Stop reason: SDUPTHER

## 2020-02-04 RX ORDER — SODIUM CHLORIDE, SODIUM LACTATE, POTASSIUM CHLORIDE, CALCIUM CHLORIDE 600; 310; 30; 20 MG/100ML; MG/100ML; MG/100ML; MG/100ML
INJECTION, SOLUTION INTRAVENOUS CONTINUOUS PRN
Status: DISCONTINUED | OUTPATIENT
Start: 2020-02-04 | End: 2020-02-04 | Stop reason: SDUPTHER

## 2020-02-04 RX ORDER — ROPIVACAINE HYDROCHLORIDE 5 MG/ML
INJECTION, SOLUTION EPIDURAL; INFILTRATION; PERINEURAL
Status: COMPLETED | OUTPATIENT
Start: 2020-02-04 | End: 2020-02-04

## 2020-02-04 RX ORDER — SUCCINYLCHOLINE/SOD CL,ISO/PF 100 MG/5ML
SYRINGE (ML) INTRAVENOUS PRN
Status: DISCONTINUED | OUTPATIENT
Start: 2020-02-04 | End: 2020-02-04 | Stop reason: SDUPTHER

## 2020-02-04 RX ORDER — DEXAMETHASONE SODIUM PHOSPHATE 10 MG/ML
INJECTION INTRAMUSCULAR; INTRAVENOUS PRN
Status: DISCONTINUED | OUTPATIENT
Start: 2020-02-04 | End: 2020-02-04 | Stop reason: SDUPTHER

## 2020-02-04 RX ORDER — SODIUM CHLORIDE 9 MG/ML
INJECTION, SOLUTION INTRAVENOUS CONTINUOUS PRN
Status: DISCONTINUED | OUTPATIENT
Start: 2020-02-04 | End: 2020-02-04 | Stop reason: SDUPTHER

## 2020-02-04 RX ORDER — MAGNESIUM HYDROXIDE 1200 MG/15ML
LIQUID ORAL CONTINUOUS PRN
Status: COMPLETED | OUTPATIENT
Start: 2020-02-04 | End: 2020-02-04

## 2020-02-04 RX ORDER — OXYCODONE HYDROCHLORIDE AND ACETAMINOPHEN 5; 325 MG/1; MG/1
1 TABLET ORAL EVERY 4 HOURS PRN
Status: DISCONTINUED | OUTPATIENT
Start: 2020-02-04 | End: 2020-02-04 | Stop reason: HOSPADM

## 2020-02-04 RX ORDER — MIDAZOLAM HYDROCHLORIDE 1 MG/ML
INJECTION INTRAMUSCULAR; INTRAVENOUS PRN
Status: DISCONTINUED | OUTPATIENT
Start: 2020-02-04 | End: 2020-02-04 | Stop reason: SDUPTHER

## 2020-02-04 RX ORDER — LIDOCAINE HYDROCHLORIDE AND EPINEPHRINE BITARTRATE 20; .01 MG/ML; MG/ML
INJECTION, SOLUTION SUBCUTANEOUS PRN
Status: DISCONTINUED | OUTPATIENT
Start: 2020-02-04 | End: 2020-02-04 | Stop reason: SDUPTHER

## 2020-02-04 RX ORDER — ONDANSETRON 2 MG/ML
4 INJECTION INTRAMUSCULAR; INTRAVENOUS
Status: COMPLETED | OUTPATIENT
Start: 2020-02-04 | End: 2020-02-04

## 2020-02-04 RX ORDER — LIDOCAINE HYDROCHLORIDE 20 MG/ML
INJECTION, SOLUTION INTRAVENOUS PRN
Status: DISCONTINUED | OUTPATIENT
Start: 2020-02-04 | End: 2020-02-04 | Stop reason: SDUPTHER

## 2020-02-04 RX ORDER — PROPOFOL 10 MG/ML
INJECTION, EMULSION INTRAVENOUS PRN
Status: DISCONTINUED | OUTPATIENT
Start: 2020-02-04 | End: 2020-02-04 | Stop reason: SDUPTHER

## 2020-02-04 RX ORDER — OXYCODONE HYDROCHLORIDE AND ACETAMINOPHEN 5; 325 MG/1; MG/1
1 TABLET ORAL EVERY 4 HOURS PRN
Qty: 12 TABLET | Refills: 0 | Status: SHIPPED | OUTPATIENT
Start: 2020-02-04 | End: 2020-02-07

## 2020-02-04 RX ORDER — SODIUM CHLORIDE 0.9 % (FLUSH) 0.9 %
10 SYRINGE (ML) INJECTION EVERY 12 HOURS SCHEDULED
Status: DISCONTINUED | OUTPATIENT
Start: 2020-02-04 | End: 2020-02-04 | Stop reason: HOSPADM

## 2020-02-04 RX ORDER — SODIUM CHLORIDE 0.9 % (FLUSH) 0.9 %
10 SYRINGE (ML) INJECTION PRN
Status: DISCONTINUED | OUTPATIENT
Start: 2020-02-04 | End: 2020-02-04 | Stop reason: HOSPADM

## 2020-02-04 RX ORDER — ROCURONIUM BROMIDE 10 MG/ML
INJECTION, SOLUTION INTRAVENOUS PRN
Status: DISCONTINUED | OUTPATIENT
Start: 2020-02-04 | End: 2020-02-04 | Stop reason: SDUPTHER

## 2020-02-04 RX ORDER — HYDROCODONE BITARTRATE AND ACETAMINOPHEN 5; 325 MG/1; MG/1
1 TABLET ORAL PRN
Status: COMPLETED | OUTPATIENT
Start: 2020-02-04 | End: 2020-02-04

## 2020-02-04 RX ORDER — FENTANYL CITRATE 50 UG/ML
50 INJECTION, SOLUTION INTRAMUSCULAR; INTRAVENOUS EVERY 10 MIN PRN
Status: DISCONTINUED | OUTPATIENT
Start: 2020-02-04 | End: 2020-02-04 | Stop reason: HOSPADM

## 2020-02-04 RX ORDER — MEPERIDINE HYDROCHLORIDE 25 MG/ML
12.5 INJECTION INTRAMUSCULAR; INTRAVENOUS; SUBCUTANEOUS EVERY 5 MIN PRN
Status: DISCONTINUED | OUTPATIENT
Start: 2020-02-04 | End: 2020-02-04 | Stop reason: HOSPADM

## 2020-02-04 RX ORDER — OXYCODONE HYDROCHLORIDE AND ACETAMINOPHEN 5; 325 MG/1; MG/1
2 TABLET ORAL EVERY 4 HOURS PRN
Status: DISCONTINUED | OUTPATIENT
Start: 2020-02-04 | End: 2020-02-04 | Stop reason: HOSPADM

## 2020-02-04 RX ADMIN — KETOROLAC TROMETHAMINE 30 MG: 30 INJECTION, SOLUTION INTRAMUSCULAR; INTRAVENOUS at 07:54

## 2020-02-04 RX ADMIN — ONDANSETRON 4 MG: 2 INJECTION INTRAMUSCULAR; INTRAVENOUS at 00:46

## 2020-02-04 RX ADMIN — ONDANSETRON 4 MG: 2 INJECTION INTRAMUSCULAR; INTRAVENOUS at 13:21

## 2020-02-04 RX ADMIN — HYDROMORPHONE HYDROCHLORIDE 0.5 MG: 1 INJECTION, SOLUTION INTRAMUSCULAR; INTRAVENOUS; SUBCUTANEOUS at 00:47

## 2020-02-04 RX ADMIN — PHENYLEPHRINE HYDROCHLORIDE 100 MCG: 10 INJECTION INTRAVENOUS at 12:18

## 2020-02-04 RX ADMIN — FENTANYL CITRATE 50 MCG: 50 INJECTION, SOLUTION INTRAMUSCULAR; INTRAVENOUS at 11:40

## 2020-02-04 RX ADMIN — LIDOCAINE HYDROCHLORIDE,EPINEPHRINE BITARTRATE 10 ML: 20; .01 INJECTION, SOLUTION INFILTRATION; PERINEURAL at 11:16

## 2020-02-04 RX ADMIN — SODIUM CHLORIDE: 9 INJECTION, SOLUTION INTRAVENOUS at 14:43

## 2020-02-04 RX ADMIN — SODIUM CHLORIDE, POTASSIUM CHLORIDE, SODIUM LACTATE AND CALCIUM CHLORIDE: 600; 310; 30; 20 INJECTION, SOLUTION INTRAVENOUS at 12:14

## 2020-02-04 RX ADMIN — SODIUM CHLORIDE: 9 INJECTION, SOLUTION INTRAVENOUS at 11:11

## 2020-02-04 RX ADMIN — Medication 60 MG: at 11:17

## 2020-02-04 RX ADMIN — ONDANSETRON 4 MG: 2 INJECTION INTRAMUSCULAR; INTRAVENOUS at 12:15

## 2020-02-04 RX ADMIN — ONDANSETRON 4 MG: 2 INJECTION INTRAMUSCULAR; INTRAVENOUS at 07:16

## 2020-02-04 RX ADMIN — PIPERACILLIN AND TAZOBACTAM 3.38 G: 3; .375 INJECTION, POWDER, FOR SOLUTION INTRAVENOUS at 02:29

## 2020-02-04 RX ADMIN — Medication 10 ML: at 07:54

## 2020-02-04 RX ADMIN — DEXAMETHASONE SODIUM PHOSPHATE 10 MG: 10 INJECTION INTRAMUSCULAR; INTRAVENOUS at 11:27

## 2020-02-04 RX ADMIN — FENTANYL CITRATE 50 MCG: 50 INJECTION, SOLUTION INTRAMUSCULAR; INTRAVENOUS at 11:17

## 2020-02-04 RX ADMIN — ROCURONIUM BROMIDE 40 MG: 10 INJECTION INTRAVENOUS at 11:22

## 2020-02-04 RX ADMIN — ROCURONIUM BROMIDE 10 MG: 10 INJECTION INTRAVENOUS at 12:00

## 2020-02-04 RX ADMIN — ROPIVACAINE HYDROCHLORIDE 25 ML: 5 INJECTION, SOLUTION EPIDURAL; INFILTRATION; PERINEURAL at 11:30

## 2020-02-04 RX ADMIN — MIDAZOLAM HYDROCHLORIDE 2 MG: 2 INJECTION, SOLUTION INTRAMUSCULAR; INTRAVENOUS at 11:11

## 2020-02-04 RX ADMIN — LIDOCAINE HYDROCHLORIDE 60 MG: 20 INJECTION, SOLUTION INTRAVENOUS at 11:17

## 2020-02-04 RX ADMIN — FENTANYL CITRATE 50 MCG: 50 INJECTION, SOLUTION INTRAMUSCULAR; INTRAVENOUS at 13:19

## 2020-02-04 RX ADMIN — PROPOFOL 160 MG: 10 INJECTION, EMULSION INTRAVENOUS at 11:17

## 2020-02-04 RX ADMIN — SUGAMMADEX 200 MG: 100 INJECTION, SOLUTION INTRAVENOUS at 12:20

## 2020-02-04 RX ADMIN — PIPERACILLIN AND TAZOBACTAM 3.38 G: 3; .375 INJECTION, POWDER, FOR SOLUTION INTRAVENOUS at 10:08

## 2020-02-04 RX ADMIN — HYDROCODONE BITARTRATE AND ACETAMINOPHEN 1 TABLET: 5; 325 TABLET ORAL at 17:35

## 2020-02-04 RX ADMIN — METRONIDAZOLE: 7.5 GEL TOPICAL at 07:49

## 2020-02-04 ASSESSMENT — PULMONARY FUNCTION TESTS
PIF_VALUE: 14
PIF_VALUE: 13
PIF_VALUE: 22
PIF_VALUE: 3
PIF_VALUE: 24
PIF_VALUE: 23
PIF_VALUE: 24
PIF_VALUE: 15
PIF_VALUE: 4
PIF_VALUE: 14
PIF_VALUE: 22
PIF_VALUE: 22
PIF_VALUE: 30
PIF_VALUE: 15
PIF_VALUE: 23
PIF_VALUE: 24
PIF_VALUE: 2
PIF_VALUE: 2
PIF_VALUE: 24
PIF_VALUE: 19
PIF_VALUE: 21
PIF_VALUE: 1
PIF_VALUE: 23
PIF_VALUE: 2
PIF_VALUE: 13
PIF_VALUE: 23
PIF_VALUE: 19
PIF_VALUE: 23
PIF_VALUE: 14
PIF_VALUE: 23
PIF_VALUE: 15
PIF_VALUE: 23
PIF_VALUE: 19
PIF_VALUE: 23
PIF_VALUE: 13
PIF_VALUE: 12
PIF_VALUE: 22
PIF_VALUE: 22
PIF_VALUE: 16
PIF_VALUE: 24
PIF_VALUE: 23
PIF_VALUE: 24
PIF_VALUE: 24
PIF_VALUE: 23
PIF_VALUE: 23
PIF_VALUE: 21
PIF_VALUE: 30
PIF_VALUE: 14
PIF_VALUE: 21
PIF_VALUE: 21
PIF_VALUE: 15
PIF_VALUE: 15
PIF_VALUE: 23
PIF_VALUE: 23
PIF_VALUE: 24
PIF_VALUE: 23
PIF_VALUE: 24
PIF_VALUE: 1
PIF_VALUE: 15
PIF_VALUE: 16
PIF_VALUE: 16
PIF_VALUE: 24
PIF_VALUE: 23
PIF_VALUE: 24
PIF_VALUE: 15
PIF_VALUE: 22
PIF_VALUE: 4
PIF_VALUE: 24
PIF_VALUE: 1
PIF_VALUE: 23
PIF_VALUE: 14
PIF_VALUE: 23
PIF_VALUE: 13
PIF_VALUE: 22
PIF_VALUE: 15
PIF_VALUE: 14
PIF_VALUE: 16
PIF_VALUE: 24
PIF_VALUE: 19
PIF_VALUE: 30
PIF_VALUE: 23
PIF_VALUE: 3

## 2020-02-04 ASSESSMENT — PAIN SCALES - GENERAL
PAINLEVEL_OUTOF10: 0
PAINLEVEL_OUTOF10: 4
PAINLEVEL_OUTOF10: 6
PAINLEVEL_OUTOF10: 5
PAINLEVEL_OUTOF10: 4

## 2020-02-04 ASSESSMENT — PAIN DESCRIPTION - LOCATION: LOCATION: ABDOMEN

## 2020-02-04 ASSESSMENT — PAIN DESCRIPTION - PAIN TYPE: TYPE: ACUTE PAIN

## 2020-02-04 NOTE — PROGRESS NOTES
Pt return from PACU after lap appy. Pt denies pain, N/V. VSS. ABD soft, 3 lap sites covered with bandaides clean, dry, intact. Tolerating clear liquids, advanced to general diet.  Electronically signed by Isabelle Valentin RN on 2/4/2020 at 3:16 PM
pack years  []   Pulmonary Disorder  (acute or chronic)  [x]   Severe or Chronic w/ Exacerbation  []     Surgical Status No [x]   Surgeries     General []   Surgery Lower []   Abdominal Thoracic or []   Upper Abdominal Thoracic with  PulmonaryDisorder  []     Chest X-ray Clear/Not  Ordered     [x]  Chronic Changes  Results Pending  []  Infiltrates, atelectasis, pleural effusion, or edema  []  Infiltrates in more than one lobe []  Infiltrate + Atelectasis, &/or pleural effusion  []    Respiratory Pattern Regular,  RR = 12-20 [x]  Increased,  RR = 21-25 []  BAZAN, irregular,  or RR = 26-30 []  Decreased FEV1  or RR = 31-35 []  Severe SOB, use  of accessory muscles, or RR ? 35  []    Mental Status Alert, oriented,  Cooperative [x]  Confused but Follows commands []  Lethargic or unable to follow commands []  Obtunded  []  Comatose  []    Breath Sounds Clear to  auscultation  [x]  Decreased unilaterally or  in bases only []  Decreased  bilaterally  []  Crackles or intermittent wheezes []  Wheezes []    Cough Strong, Spontan., & nonproductive [x]  Strong,  spontaneous, &  productive []  Weak,  Nonproductive []  Weak, productive or  with wheezes []  No spontaneous  cough or may require suctioning []    Level of Activity Ambulatory [x]  Ambulatory w/ Assist  []  Non-ambulatory []  Paraplegic []  Quadriplegic []    Total    Score:____3___     Triage Score:__5___      Tri       Triage:     1. (>20) Freq: Q3    2. (16-20) Freq: Q4   3. (11-15) Freq: QID & Albuterol Q2 PRN    4. (6-10) Freq: TID & Albuterol Q2 PRN    5. (0-5) Freq Q4prn

## 2020-02-04 NOTE — CONSULTS
motion without deformity. Skin: Skin color, texture, turgor normal.  No rashes or lesions. Neurologic:  Neurovascularly intact without any focal sensory/motor deficits. Cranial nerves: II-XII intact, grossly non-focal.  Psychiatric:  Alert and oriented, thought content appropriate, normal insight  Capillary Refill: Brisk,< 3 seconds   Peripheral Pulses: +2 palpable, equal bilaterally       Labs:     Recent Labs     02/03/20  1645   WBC 12.9*   HGB 14.1   HCT 42.1        Recent Labs     02/03/20  1645 02/03/20  1736     --    K 4.1  --    CL 97  --    CO2 25  --    BUN 10  --    CREATININE 0.55 0.6   CALCIUM 9.1  --      Recent Labs     02/03/20  1645   AST 18   ALT 11   BILITOT 0.4   ALKPHOS 64     No results for input(s): INR in the last 72 hours. No results for input(s): Khushboo Medici in the last 72 hours. Urinalysis:      Lab Results   Component Value Date    NITRU Negative 02/03/2020    WBCUA 3-5 02/03/2020    BACTERIA Negative 02/03/2020    RBCUA 6-10 02/03/2020    BLOODU Negative 02/03/2020    SPECGRAV 1.015 02/03/2020    GLUCOSEU Negative 02/03/2020       Radiology:     CXR: I have reviewed the CXR with the following interpretation: Per radiology  EKG:  I have reviewed the EKG with the following interpretation: Please review    CT ABDOMEN PELVIS W IV CONTRAST Additional Contrast? None   Final Result   1. THE APPENDIX IS DILATED. IT MEASURES 9 MM THERE IS A APPENDICOLITH AT THE TIP OF THE APPENDIX. THERE IS SURROUNDING PERIAPPENDICEAL STRANDING. RADIOGRAPHIC FINDINGS OF ACUTE APPENDICITIS. DR. Lavonne Curtis OF THE EMERGENCY ROOM WAS NOTIFIED IMMEDIATELY OF THE ABOVE FINDINGS AT [place] [address] 3, 2020      All CT scans at this facility use dose modulation, iterative reconstruction, and/or weight based dosing when appropriate to reduce radiation dose to as low as reasonably achievable.           ASSESSMENT:    There are no active hospital problems to display for this

## 2020-02-04 NOTE — H&P
abnormality, atraumatic, sinuses nontender on palpation, external ears without lesions, oral pharynx with moist mucus membranes, tonsils without erythema or exudates, gums normal and good dentition. NECK:  Supple, symmetrical, trachea midline, no adenopathy, thyroid symmetric, not enlarged and no tenderness, skin normal  LUNGS:  No increased work of breathing, good air exchange, clear to auscultation bilaterally, no crackles or wheezing  CARDIOVASCULAR:  Normal apical impulse, regular rate and rhythm, normal S1 and S2, no S3 or S4, and no murmur noted  ABDOMEN: Abdomen soft, incisions well-healed without hernia. Right lower quadrant tenderness on palpation. No skin changes  MUSCULOSKELETAL:  There is no redness, warmth, or swelling of the joints. Full range of motion noted. Motor strength is 5 out of 5 all extremities bilaterally. Tone is normal.  NEUROLOGIC:  Awake, alert, oriented to name, place and time. Cranial nerves II-XII are grossly intact. Motor is 5 out of 5 bilaterally. Cerebellar finger to nose, heel to shin intact. Sensory is intact. Babinski down going, Romberg negative, and gait is normal.  SKIN:  no bruising or bleeding  DATA:    CBC:   Lab Results   Component Value Date    WBC 12.9 02/03/2020    RBC 4.48 02/03/2020    HGB 14.1 02/03/2020    HCT 42.1 02/03/2020    MCV 94.0 02/03/2020    MCH 31.4 02/03/2020    MCHC 33.4 02/03/2020    RDW 13.6 02/03/2020     02/03/2020    MPV 9.3 02/10/2014     BMP:    Lab Results   Component Value Date     02/03/2020    K 4.1 02/03/2020    CL 97 02/03/2020    CO2 25 02/03/2020    BUN 10 02/03/2020    LABALBU 4.6 02/03/2020    CREATININE 0.6 02/03/2020    CREATININE 0.55 02/03/2020    CALCIUM 9.1 02/03/2020    GFRAA >60 02/03/2020    LABGLOM >60 02/03/2020    GLUCOSE 100 02/03/2020     Radiology Review: CT scan with appendicolith present with dilated appendix and stranding consistent with acute appendicitis.     IMPRESSION/RECOMMENDATIONS: Risks and benefits of laparoscopic possible open appendectomy outlined. Risks of infection, bleeding, damage to the surrounding intestine, reoperation postoperative pain all addressed. She wishes to proceed. Consent obtained.

## 2020-02-04 NOTE — BRIEF OP NOTE
Department of General Surgery - Adult  Surgical Service general surgery  Brief Operative Report      Pre-operative Diagnosis: Acute appendicitis    Post-operative Diagnosis: Same    Procedure: Laparoscopic appendectomy    Surgeon: Mariah Kee     Assistant(s): Lizzy    Anesthesia:  General endotrachial anesthesia and tap block    Estimated blood loss: 50    Total Urine Output: Not recorded     Drains: None    Specimens: Appendix    Implants: None    Findings: Acute appendicitis    Complications: None    Condition:  stable    See dictated operative report for full details.

## 2020-02-04 NOTE — OP NOTE
[address]                      [facility], [address] [facility]                                OPERATIVE REPORT    PATIENT NAME: Flory Schafer                     :        1955  MED REC NO:   [id number]                            ROOM:  ACCOUNT NO:   [de-identified]                           ADMIT DATE: 2020  PROVIDER:     Riaz Campbell MD    DATE OF PROCEDURE:  2020    PREOPERATIVE DIAGNOSIS:  Acute appendicitis. POSTOPERATIVE DIAGNOSIS:  Acute appendicitis. PROCEDURE PERFORMED:  Laparoscopic appendectomy. SURGEON:  MD Myrna Lafleur. ANESTHESIA:  1. General endotracheal anesthesia. 2.  Tap block. ESTIMATED BLOOD LOSS:  50 mL. SPECIMEN:  Appendix. COMPLICATIONS:  None. INDICATION:  A 61-year-old female with a clinical history, physical exam  and CT scan findings consistent with acute appendicitis secondary to  appendicolith. She was admitted and risks and benefits were discussed regarding  laparoscopic possible open appendectomy. Risks of the procedure  including infection, bleeding, damage to the surrounding intestine,  staple line leak as well as reoperation, and injury to the surrounding  intestine including ureter were all outlined. Despite these risks, she  wished to proceed. Consent obtained. OPERATIVE PROCEDURE:  She was taken to the operating room, placed in the  supine position. General endotracheal anesthesia was administered. A  TAP block was placed preoperatively by Anesthesia. The abdomen was  prepped and draped with a ChloraPrep-containing solution. She received  Zosyn preoperatively. A time-out was taken for appropriate  verification. A 5-mm infraumbilical incision was made. An optical trocar was placed  and pneumoperitoneum was established. The two lower ports were placed  under direct visualization.     The appendix was inflamed and densely adhered in the

## 2020-02-04 NOTE — ANESTHESIA PRE PROCEDURE
Department of Anesthesiology  Preprocedure Note       Name:  Jayde Schroeder   Age:  62 y.o.  :  1955                                          MRN:  [id number]         Date:  2020      Surgeon: Anika Pike):  Sebastián Snow MD    Procedure: APPENDECTOMY LAPAROSCOPIC DBIV 330 (N/A )    Medications prior to admission:   Prior to Admission medications    Medication Sig Start Date End Date Taking? Authorizing Provider   metroNIDAZOLE (METROGEL) 0.75 % gel  20  Yes Historical Provider, MD   albuterol sulfate  (90 Base) MCG/ACT inhaler Inhale 2 puffs into the lungs every 6 hours as needed for Wheezing 2/3/20  Yes PAUL Grullon - CNP   estradiol (CLIMARA) 0.1 MG/24HR APPLY 1 PATCH TWICE WEEKLY 19  Yes Michael Manjarrez DO   Vitamin D (CHOLECALCIFEROL) 1000 UNITS CAPS capsule Take 2 capsules by mouth daily. 14  Yes Wilberto Vega MD   calcium carbonate 600 MG TABS tablet Take 1 tablet by mouth daily.    Yes Historical Provider, MD   mometasone (NASONEX) 50 MCG/ACT nasal spray 2 sprays by Nasal route daily    Historical Provider, MD       Current medications:    Current Facility-Administered Medications   Medication Dose Route Frequency Provider Last Rate Last Dose    ketorolac (TORADOL) injection 30 mg  30 mg Intravenous Q6H PRN Sebastián Snow MD   30 mg at 20 0754    morphine (PF) injection 4 mg  4 mg Intravenous Q30 Min PRN PAUL Johnson - CNP   2 mg at 20 1735    sodium chloride flush 0.9 % injection 10 mL  10 mL Intravenous 2 times per day Sebastián Snow MD   10 mL at 20 0754    sodium chloride flush 0.9 % injection 10 mL  10 mL Intravenous PRN Sebastián Snow MD        acetaminophen (TYLENOL) tablet 650 mg  650 mg Oral Q4H PRN Sebastián Snow MD   650 mg at 20 2236    enoxaparin (LOVENOX) injection 40 mg  40 mg Subcutaneous Daily Sebastián Snow MD   Stopped at 20 0900    0.9 % sodium chloride infusion

## 2020-02-05 ENCOUNTER — TELEPHONE (OUTPATIENT)
Dept: FAMILY MEDICINE CLINIC | Age: 65
End: 2020-02-05

## 2020-02-05 LAB — URINE CULTURE, ROUTINE: NORMAL

## 2020-02-05 NOTE — DISCHARGE SUMMARY
Physician Discharge Summary     Patient ID:  Allison Culver  [id number]  74 y.o.  1955    Admit date: 2/3/2020    Discharge date and time: 2/4/2020  7:01 PM     Admitting Physician: Chapo Ridley MD     Discharge Physician: Same    Admission Diagnoses: Appendicitis [K37]    Discharge Diagnoses: Same    Admission Condition: fair    Discharged Condition: good    Indication for Admission: Acute appendicitis    Hospital Course: Patient was brought in the hospital on 2/3/2020 for acute appendicitis for IV antibiotics and surgical evaluation. The details of the admitting history and physical can be found in the chart. Patient was taken to the operating room the following day for a laparoscopic appendectomy the details which can be found in the operative report. She was returned back to the floor. Pain control was excellent. She was started on liquids and advance to a regular diet. She remained afebrile and her vital signs remained stable. Later that day I discussed instructions regarding activity, medications, follow-up, and wound care. She will return back to see me in the office next week for histology results and postoperative check. Consults: none    Significant Diagnostic Studies: labs: CBC, BMP    Treatments: antibiotics: Zosyn and surgery: Laparoscopic appendectomy    Discharge Exam:  See exam dated 2/4/2020    Disposition: home    In process/preliminary results:  Outstanding Order Results     Date and Time Order Name Status Description    2/4/2020 1340 Surgical Pathology In process     2/4/2020 0655 Culture Blood #2 In process     2/3/2020 1800 Culture Blood #1 Preliminary           Patient Instructions:   Discharge Medication List as of 2/4/2020  6:46 PM      START taking these medications    Details   oxyCODONE-acetaminophen (PERCOCET) 5-325 MG per tablet Take 1 tablet by mouth every 4 hours as needed for Pain for up to 3 days. , Disp-12 tablet, R-0Print         CONTINUE these medications which have NOT CHANGED    Details   metroNIDAZOLE (METROGEL) 0.75 % gel Historical Med      albuterol sulfate  (90 Base) MCG/ACT inhaler Inhale 2 puffs into the lungs every 6 hours as needed for Wheezing, Disp-1 Inhaler, R-3Normal      estradiol (CLIMARA) 0.1 MG/24HR APPLY 1 PATCH TWICE WEEKLY, Disp-24 patch, R-3Normal      mometasone (NASONEX) 50 MCG/ACT nasal spray 2 sprays by Nasal route daily, Nasal, DAILY, Until Discontinued, Historical Med      Vitamin D (CHOLECALCIFEROL) 1000 UNITS CAPS capsule Take 2 capsules by mouth daily. , Disp-30 capsule      calcium carbonate 600 MG TABS tablet Take 1 tablet by mouth daily. Activity: activity as tolerated  Diet: regular diet  Wound Care: keep wound clean and dry    Follow-up with Lucas Ly in 9 days.     SignedJanelle Cates  2/5/2020  7:57 AM

## 2020-02-05 NOTE — TELEPHONE ENCOUNTER
[facility] Transitions Initial Follow Up Call    Outreach made within 2 business days of discharge: Yes    Patient: Roddy Martinez Patient : 1955   MRN: [id number]  Reason for Admission: There are no discharge diagnoses documented for the most recent discharge. Discharge Date: 20       Spoke with: Patient    Discharge department/facility: [facility]    TCM Interactive Patient Contact:  Was patient able to fill all prescriptions: No:  getting that filled today, she had an old pain Rx she used after d/c at home  Was patient instructed to bring all medications to the follow-up visit: Yes  Is patient taking all medications as directed in the discharge summary? Yes but currently taking a prior pain med while  gets new Rx  Does patient understand their discharge instructions: Yes  Does patient have questions or concerns that need addressed prior to 7-14 day follow up office visit: no    Scheduled appointment with PCP within 7-14 days - PATIENT DECLINED TO [facility] PCP AT THIS TIME AS SHE WILL SEE SURGEON 10:45AM ON 2020 - she requested 18:86CK (duplicate) appt be canceled.     Follow Up  Future Appointments   Date Time Provider Lorie Bellamy   2020 10:30 AM MD CHERYL [facility] EMERGENCY [facility]   2020 10:45 AM Tahir Cowan  [facility]

## 2020-02-08 LAB — BLOOD CULTURE, ROUTINE: NORMAL

## 2020-02-09 LAB — CULTURE, BLOOD 2: NORMAL

## 2020-02-14 ENCOUNTER — OFFICE VISIT (OUTPATIENT)
Dept: SURGERY | Age: 65
End: 2020-02-14

## 2020-02-14 VITALS
OXYGEN SATURATION: 98 % | HEIGHT: 63 IN | WEIGHT: 140 LBS | HEART RATE: 62 BPM | TEMPERATURE: 97.8 F | BODY MASS INDEX: 24.8 KG/M2

## 2020-02-14 PROCEDURE — 99024 POSTOP FOLLOW-UP VISIT: CPT | Performed by: COLON & RECTAL SURGERY

## 2020-02-14 NOTE — PROGRESS NOTES
Subjective:      Patient ID: Nikolas Anderson is a 62 y.o. female who presents for:  Chief Complaint   Patient presents with    Post-Op Check       She returns to the office 10 days out from a laparoscopic appendectomy for acute appendicitis. She has been doing well. She has had some drainage from her umbilical incision but has been eating. She denies fever. Bowels have been working. Past Medical History:   Diagnosis Date    Asthma     GERD (gastroesophageal reflux disease)     Osteopenia      Past Surgical History:   Procedure Laterality Date    COLONOSCOPY      HYSTERECTOMY  2004    total    KNEE ARTHROSCOPY  age 22   Nathen.Jessica LAPAROSCOPIC APPENDECTOMY N/A 2/4/2020    APPENDECTOMY LAPAROSCOPIC performed by Rashard Joseph MD at [address] 10/3/2018    LAPAROSCOPY EXPLORATORY performed by Rashard Joseph MD at [address] [facility] POLYPECTOMY  2010    colon, stomach    LA RECTUM SURGERY PROCEDURE UNLISTED N/A 10/3/2018    TRANSVAGINAL RECTOCELE REPAIR and Diagnostic Laproscopy performed by Rashard Joseph MD at [address] [facility]      child    UPPER GASTROINTESTINAL ENDOSCOPY  11/5/15     DR. LOVING      Social History     Socioeconomic History    Marital status:      Spouse name: Not on file    Number of children: Not on file    Years of education: Not on file    Highest education level: Not on file   Occupational History    Not on file   Social Needs    Financial resource strain: Not on file    Food insecurity:     Worry: Not on file     Inability: Not on file    Transportation needs:     Medical: Not on file     Non-medical: Not on file   Tobacco Use    Smoking status: Never Smoker    Smokeless tobacco: Never Used   Substance and Sexual Activity    Alcohol use: Yes     Comment: socially    Drug use: No    Sexual activity: Yes     Partners: Male   Lifestyle    Physical activity:     Days per week: Not on file     Minutes per session: Not on file    Stress: Not on file   Relationships    Social connections:     Talks on phone: Not on file     Gets together: Not on file     Attends Sabianism service: Not on file     Active member of club or organization: Not on file     Attends meetings of clubs or organizations: Not on file     Relationship status: Not on file    Intimate partner violence:     Fear of current or ex partner: Not on file     Emotionally abused: Not on file     Physically abused: Not on file     Forced sexual activity: Not on file   Other Topics Concern    Not on file   Social History Narrative    Not on file     Family History   Problem Relation Age of Onset    Arthritis Mother         RA    Asthma Mother     Kidney Disease Mother     Arthritis Father         RA    Cancer Father         prostate    Heart Disease Father     High Blood Pressure Father     High Cholesterol Father     Stroke Father     Other Father         melenoma    Breast Cancer Neg Hx      Allergies:  Sulfamethoxazole-trimethoprim and Sulfamethoxazole-trimethoprim    Review of Systems    Objective:    Pulse 62   Temp 97.8 °F (36.6 °C) (Temporal)   Ht 5' 3\" (1.6 m)   Wt 140 lb (63.5 kg)   LMP  (Exact Date)   SpO2 98%   BMI 24.80 kg/m²     Physical Exam  On exam her incisions are healing well. Staples were removed. Bruising present but resolving. Abdomen soft and nondistended. Assessment/Plan:          Diagnosis Orders   1. Acute appendicitis with localized peritonitis, without perforation, abscess, or gangrene       Histology reviewed and benign. She will return back to see me in the office as needed. Wound care and activity instructions were given. Please note this report has beenpartially produced using speech recognition software and may cause contain errors related to that system including grammar, punctuation and spelling as well as words and phrases that may seem inappropriate.  If there arequestions or concerns please feel free to contact me to clarify

## 2020-02-17 ENCOUNTER — HOSPITAL ENCOUNTER (EMERGENCY)
Age: 65
Discharge: HOME | End: 2020-02-17
Attending: EMERGENCY MEDICINE
Payer: COMMERCIAL

## 2020-02-17 ENCOUNTER — APPOINTMENT (OUTPATIENT)
Dept: GENERAL RADIOLOGY | Age: 65
End: 2020-02-17
Payer: COMMERCIAL

## 2020-02-17 VITALS
BODY MASS INDEX: 24.8 KG/M2 | RESPIRATION RATE: 18 BRPM | HEIGHT: 63 IN | HEART RATE: 62 BPM | SYSTOLIC BLOOD PRESSURE: 111 MMHG | DIASTOLIC BLOOD PRESSURE: 64 MMHG | TEMPERATURE: 98.3 F | WEIGHT: 140 LBS | OXYGEN SATURATION: 95 %

## 2020-02-17 LAB
ANION GAP SERPL CALCULATED.3IONS-SCNC: 11 MEQ/L (ref 9–15)
BASOPHILS ABSOLUTE: 0 K/UL (ref 0–0.2)
BASOPHILS RELATIVE PERCENT: 0.5 %
BUN BLDV-MCNC: 14 MG/DL (ref 8–23)
CALCIUM SERPL-MCNC: 9.1 MG/DL (ref 8.5–9.9)
CHLORIDE BLD-SCNC: 96 MEQ/L (ref 95–107)
CO2: 27 MEQ/L (ref 20–31)
CREAT SERPL-MCNC: 0.64 MG/DL (ref 0.5–0.9)
EKG ATRIAL RATE: 70 BPM
EKG ATRIAL RATE: 78 BPM
EKG P AXIS: 55 DEGREES
EKG P-R INTERVAL: 144 MS
EKG P-R INTERVAL: 146 MS
EKG Q-T INTERVAL: 394 MS
EKG Q-T INTERVAL: 424 MS
EKG QRS DURATION: 78 MS
EKG QRS DURATION: 82 MS
EKG QTC CALCULATION (BAZETT): 449 MS
EKG QTC CALCULATION (BAZETT): 457 MS
EKG R AXIS: 140 DEGREES
EKG R AXIS: 38 DEGREES
EKG T AXIS: 161 DEGREES
EKG T AXIS: 42 DEGREES
EKG VENTRICULAR RATE: 70 BPM
EKG VENTRICULAR RATE: 78 BPM
EOSINOPHILS ABSOLUTE: 0.1 K/UL (ref 0–0.7)
EOSINOPHILS RELATIVE PERCENT: 1.8 %
GFR AFRICAN AMERICAN: >60
GFR NON-AFRICAN AMERICAN: >60
GLUCOSE BLD-MCNC: 94 MG/DL (ref 70–99)
HCT VFR BLD CALC: 39.4 % (ref 37–47)
HEMOGLOBIN: 13.3 G/DL (ref 12–16)
LYMPHOCYTES ABSOLUTE: 2.7 K/UL (ref 1–4.8)
LYMPHOCYTES RELATIVE PERCENT: 33.2 %
MCH RBC QN AUTO: 31.5 PG (ref 27–31.3)
MCHC RBC AUTO-ENTMCNC: 33.7 % (ref 33–37)
MCV RBC AUTO: 93.5 FL (ref 82–100)
MONOCYTES ABSOLUTE: 0.6 K/UL (ref 0.2–0.8)
MONOCYTES RELATIVE PERCENT: 7.2 %
NEUTROPHILS ABSOLUTE: 4.7 K/UL (ref 1.4–6.5)
NEUTROPHILS RELATIVE PERCENT: 57.3 %
PDW BLD-RTO: 13.3 % (ref 11.5–14.5)
PLATELET # BLD: 258 K/UL (ref 130–400)
POTASSIUM SERPL-SCNC: 3.8 MEQ/L (ref 3.4–4.9)
RBC # BLD: 4.22 M/UL (ref 4.2–5.4)
SODIUM BLD-SCNC: 134 MEQ/L (ref 135–144)
TROPONIN: <0.01 NG/ML (ref 0–0.01)
WBC # BLD: 8.1 K/UL (ref 4.8–10.8)

## 2020-02-17 PROCEDURE — 99285 EMERGENCY DEPT VISIT HI MDM: CPT

## 2020-02-17 PROCEDURE — 93005 ELECTROCARDIOGRAM TRACING: CPT | Performed by: EMERGENCY MEDICINE

## 2020-02-17 PROCEDURE — 36415 COLL VENOUS BLD VENIPUNCTURE: CPT

## 2020-02-17 PROCEDURE — 84484 ASSAY OF TROPONIN QUANT: CPT

## 2020-02-17 PROCEDURE — 85025 COMPLETE CBC W/AUTO DIFF WBC: CPT

## 2020-02-17 PROCEDURE — 71046 X-RAY EXAM CHEST 2 VIEWS: CPT

## 2020-02-17 PROCEDURE — 80048 BASIC METABOLIC PNL TOTAL CA: CPT

## 2020-02-17 PROCEDURE — 6370000000 HC RX 637 (ALT 250 FOR IP): Performed by: EMERGENCY MEDICINE

## 2020-02-17 RX ORDER — ASPIRIN 81 MG/1
324 TABLET, CHEWABLE ORAL ONCE
Status: COMPLETED | OUTPATIENT
Start: 2020-02-17 | End: 2020-02-17

## 2020-02-17 RX ADMIN — ASPIRIN 81 MG 324 MG: 81 TABLET ORAL at 22:31

## 2020-02-17 RX ADMIN — LIDOCAINE HYDROCHLORIDE: 20 SOLUTION ORAL; TOPICAL at 22:33

## 2020-02-17 ASSESSMENT — HEART SCORE: ECG: 1

## 2020-02-17 ASSESSMENT — PAIN DESCRIPTION - LOCATION: LOCATION: CHEST

## 2020-02-17 ASSESSMENT — PAIN DESCRIPTION - PAIN TYPE: TYPE: ACUTE PAIN

## 2020-02-17 ASSESSMENT — PAIN SCALES - GENERAL
PAINLEVEL_OUTOF10: 0
PAINLEVEL_OUTOF10: 4

## 2020-02-17 ASSESSMENT — PAIN DESCRIPTION - ONSET: ONSET: ON-GOING

## 2020-02-17 ASSESSMENT — PAIN DESCRIPTION - FREQUENCY: FREQUENCY: CONTINUOUS

## 2020-02-17 ASSESSMENT — PAIN DESCRIPTION - DESCRIPTORS: DESCRIPTORS: BURNING

## 2020-02-18 ENCOUNTER — OFFICE VISIT (OUTPATIENT)
Dept: FAMILY MEDICINE CLINIC | Age: 65
End: 2020-02-18
Payer: COMMERCIAL

## 2020-02-18 VITALS
RESPIRATION RATE: 16 BRPM | HEART RATE: 64 BPM | SYSTOLIC BLOOD PRESSURE: 110 MMHG | WEIGHT: 140 LBS | OXYGEN SATURATION: 98 % | HEIGHT: 63 IN | TEMPERATURE: 98.1 F | DIASTOLIC BLOOD PRESSURE: 66 MMHG | BODY MASS INDEX: 24.8 KG/M2

## 2020-02-18 PROCEDURE — 1111F DSCHRG MED/CURRENT MED MERGE: CPT | Performed by: NURSE PRACTITIONER

## 2020-02-18 PROCEDURE — 99495 TRANSJ CARE MGMT MOD F2F 14D: CPT | Performed by: NURSE PRACTITIONER

## 2020-02-18 PROCEDURE — 93010 ELECTROCARDIOGRAM REPORT: CPT | Performed by: INTERNAL MEDICINE

## 2020-02-18 RX ORDER — DOXYCYCLINE 100 MG/1
CAPSULE ORAL
Status: ON HOLD | COMMUNITY
Start: 2020-02-17 | End: 2021-03-03

## 2020-02-18 RX ORDER — CLOBETASOL PROPIONATE 0.5 MG/G
CREAM TOPICAL
COMMUNITY
Start: 2020-02-17 | End: 2020-03-16

## 2020-02-18 RX ORDER — PANTOPRAZOLE SODIUM 40 MG/1
40 TABLET, DELAYED RELEASE ORAL
Qty: 30 TABLET | Refills: 2 | Status: ON HOLD | OUTPATIENT
Start: 2020-02-18 | End: 2021-03-03

## 2020-02-18 ASSESSMENT — ENCOUNTER SYMPTOMS
SHORTNESS OF BREATH: 0
ANAL BLEEDING: 0
DIARRHEA: 0
CHEST TIGHTNESS: 0
VOMITING: 0
COUGH: 0
NAUSEA: 1
EYES NEGATIVE: 1
WHEEZING: 0
CONSTIPATION: 0
RECTAL PAIN: 0
ABDOMINAL DISTENTION: 0
BLOOD IN STOOL: 0
ABDOMINAL PAIN: 1

## 2020-02-18 NOTE — ED TRIAGE NOTES
Patient came to the ED for mid-sternal chest pain. Patient states the chest pain began on Saturday with gastric reflux. Patient describes the pain as burning. Pt took Tums and Rolaids since Saturday with minimal relief. A&Ox4. Respirations even and unlabored.

## 2020-02-18 NOTE — PROGRESS NOTES
daily.                   Medications marked \"taking\" at this time  Outpatient Medications Marked as Taking for the 2/18/20 encounter (Office Visit) with PAUL Tompkins CNP   Medication Sig Dispense Refill    pantoprazole (PROTONIX) 40 MG tablet Take 1 tablet by mouth every morning (before breakfast) 30 tablet 2    metroNIDAZOLE (METROGEL) 0.75 % gel       albuterol sulfate  (90 Base) MCG/ACT inhaler Inhale 2 puffs into the lungs every 6 hours as needed for Wheezing 1 Inhaler 3    estradiol (CLIMARA) 0.1 MG/24HR APPLY 1 PATCH TWICE WEEKLY 24 patch 3    mometasone (NASONEX) 50 MCG/ACT nasal spray 2 sprays by Nasal route daily      Vitamin D (CHOLECALCIFEROL) 1000 UNITS CAPS capsule Take 2 capsules by mouth daily. 30 capsule     calcium carbonate 600 MG TABS tablet Take 1 tablet by mouth daily. Medications patient taking as of now reconciled against medications ordered at time of hospital discharge: Yes    Chief Complaint   Patient presents with   [address] from Hospital     Pt. is here for an ER f/u from [facility] on 02/17/2020 for chest pain. Pt. has been having continued issues with heartburn and gas. Pt. had labs, EKG and Chest Xray. Pt. states she has Pepcid at home and has seen Dr. Bryn Ahn in the past. Pt. states the burning woke her up again this morning and has been belching a lot. HPI    Inpatient course: Discharge summary reviewed- see chart. Interval history/Current status: stable    Review of Systems   Constitutional: Negative for activity change, appetite change, chills, diaphoresis, fatigue, fever and unexpected weight change. HENT: Negative. Eyes: Negative. Respiratory: Negative for cough, chest tightness, shortness of breath and wheezing. Cardiovascular: Negative for chest pain, palpitations and leg swelling. Gastrointestinal: Positive for abdominal pain and nausea.  Negative for abdominal distention, anal bleeding, blood in stool, constipation, diarrhea, Musculoskeletal: Normal range of motion. General: No swelling or tenderness. Lymphadenopathy:      Cervical: No cervical adenopathy. Skin:     General: Skin is warm and dry. Neurological:      Mental Status: She is alert and oriented to person, place, and time. Psychiatric:         Mood and Affect: Mood normal.         Behavior: Behavior normal.             Assessment/Plan:  1. Acute appendicitis with localized peritonitis, without perforation, abscess, or gangrene  -healing well f/u with surgeon prn  - AL DISCHARGE MEDS RECONCILED W/ CURRENT OUTPATIENT MED LIST    2. Gastroesophageal reflux disease without esophagitis    - pantoprazole (PROTONIX) 40 MG tablet; Take 1 tablet by mouth every morning (before breakfast)  Dispense: 30 tablet; Refill: 2    3.  Chest pain, unspecified type    -r/u cardiac in nature treatment given for GERD  - AL DISCHARGE MEDS RECONCILED W/ CURRENT OUTPATIENT MED LIST        Medical Decision Making: moderate complexity

## 2020-02-18 NOTE — ED NOTES
Patient is alert and oriented, skin is warm and dry, respirations are unlabored, denies any pain, discharged ambulatory to lobby with .       Marty Nova RN  02/17/20 [id number]

## 2020-02-18 NOTE — ED PROVIDER NOTES
takes less than 2 seconds. Neurological:      General: No focal deficit present. Mental Status: She is alert and oriented to person, place, and time. Mental status is at baseline. Cranial Nerves: No cranial nerve deficit. Psychiatric:         Thought Content: Thought content normal.         DIAGNOSTIC RESULTS     EKG: All EKG's are interpreted by the Emergency Department Physician who either signs or Co-signs this chart in the absence of a cardiologist.    NSR, rate 78, normal intervals, no ST elevation/ depression, low voltage    REPEAT EKG:  NSR, rate 70, normal intervals, no ST elevation/ depression    RADIOLOGY:   Non-plain film images such as CT, Ultrasound and MRI are read by the radiologist. Plain radiographic images are visualized and preliminarily interpreted by the emergency physician with the below findings:    CXR: no acute findings noted    Interpretation per the Radiologist below, if available at the time of this note:    XR CHEST STANDARD (2 VW)    (Results Pending)       LABS:  Labs Reviewed   BASIC METABOLIC PANEL - Abnormal; Notable for the following components:       Result Value    Sodium 134 (*)     All other components within normal limits   CBC WITH AUTO DIFFERENTIAL - Abnormal; Notable for the following components:    MCH 31.5 (*)     All other components within normal limits   TROPONIN       All other labs were within normal range or not returned as of this dictation. EMERGENCY DEPARTMENT COURSE and DIFFERENTIAL DIAGNOSIS/MDM:   Vitals:    Vitals:    02/17/20 2215 02/17/20 2230 02/17/20 2300   BP: (!) 128/106 112/68 114/72   Pulse: 78 75 67   Resp: 19     Temp: 98.3 °F (36.8 °C)     SpO2: 96%  96%   Weight: 140 lb (63.5 kg)     Height: 5' 3\" (1.6 m)         MDM  Number of Diagnoses or Management Options  Chest pain, unspecified type:   Diagnosis management comments: 80-year-old female presenting with chest pain. Initial EKG was low voltage but showed no ischemia. Laboratory work-up is unremarkable. The repeat EKG is normal and unchanged from previous EKGs, no ischemia noted. Given a GI cocktail with complete resolution of her symptoms. Heart score of 2 and patient is low risk. Her symptoms have been ongoing for 2 days, rules out with 1 troponin. No concern for PE based on history and physical.  Patient will be discharged home in good condition. Patient has been hemodynamically stable throughout ED course and is appropriate for outpatient follow up. Patient has f/u tomorrow with her PCP and should return to ED immediately for any new or worsening symptoms. Patient is well appearing on discharge and agreeable with plan of care. Patient Progress  Patient progress: improved      Procedures    CRITICAL CARE TIME   Total Critical Care time was 0 minutes, excluding separately reportable procedures. There was a high probability of clinically significant/life threatening deterioration in the patient's condition which required my urgent intervention. FINAL IMPRESSION      1.  Chest pain, unspecified type          DISPOSITION/PLAN   DISPOSITION        (Please note that portions of this note were completed with a voice recognition program.  Efforts were made to edit the dictations but occasionally words are mis-transcribed.)    Francesco Bunn MD (electronically signed)  Attending Emergency Physician        Francesco Bunn MD  02/17/20 [id number]

## 2020-02-25 ENCOUNTER — OFFICE VISIT (OUTPATIENT)
Dept: FAMILY MEDICINE CLINIC | Age: 65
End: 2020-02-25
Payer: COMMERCIAL

## 2020-02-25 VITALS
HEART RATE: 82 BPM | RESPIRATION RATE: 16 BRPM | SYSTOLIC BLOOD PRESSURE: 122 MMHG | WEIGHT: 140 LBS | DIASTOLIC BLOOD PRESSURE: 70 MMHG | OXYGEN SATURATION: 99 % | BODY MASS INDEX: 24.8 KG/M2 | HEIGHT: 63 IN | TEMPERATURE: 97.5 F

## 2020-02-25 PROCEDURE — G8420 CALC BMI NORM PARAMETERS: HCPCS | Performed by: NURSE PRACTITIONER

## 2020-02-25 PROCEDURE — 1036F TOBACCO NON-USER: CPT | Performed by: NURSE PRACTITIONER

## 2020-02-25 PROCEDURE — G8482 FLU IMMUNIZE ORDER/ADMIN: HCPCS | Performed by: NURSE PRACTITIONER

## 2020-02-25 PROCEDURE — 99213 OFFICE O/P EST LOW 20 MIN: CPT | Performed by: NURSE PRACTITIONER

## 2020-02-25 PROCEDURE — 1111F DSCHRG MED/CURRENT MED MERGE: CPT | Performed by: NURSE PRACTITIONER

## 2020-02-25 PROCEDURE — G8427 DOCREV CUR MEDS BY ELIG CLIN: HCPCS | Performed by: NURSE PRACTITIONER

## 2020-02-25 PROCEDURE — 3017F COLORECTAL CA SCREEN DOC REV: CPT | Performed by: NURSE PRACTITIONER

## 2020-02-25 RX ORDER — LIDOCAINE HYDROCHLORIDE 20 MG/ML
15 SOLUTION OROPHARYNGEAL PRN
Qty: 100 ML | Refills: 0 | Status: ON HOLD | OUTPATIENT
Start: 2020-02-25 | End: 2021-03-03

## 2020-02-25 RX ORDER — LIDOCAINE HYDROCHLORIDE 20 MG/ML
15 SOLUTION OROPHARYNGEAL PRN
Qty: 100 ML | Refills: 0 | Status: SHIPPED | OUTPATIENT
Start: 2020-02-25 | End: 2020-02-25 | Stop reason: SDUPTHER

## 2020-02-25 NOTE — PROGRESS NOTES
Subjective:     Patient ID: Patrick Tony is a 62 y.o. female who presentstoday for:  Chief Complaint   Patient presents with    Gastroesophageal Reflux     Pt. is here for a 1 week f/u on GERD. Pt. was prescribed Protonix at her last visit with mild relief. Pt. c/o still a lof of heartburn, gas and poor appetite. HPI  Patient here for f/u on GERD reports mild relief of symptoms with the Protonix. Has appointment to see Dr. Trinidad Morales tomorrow. Denies any new symptoms. Past Medical History:   Diagnosis Date    Asthma     GERD (gastroesophageal reflux disease)     Osteopenia      Current Outpatient Medications on File Prior to Visit   Medication Sig Dispense Refill    doxycycline monohydrate (MONODOX) 100 MG capsule       clobetasol (TEMOVATE) 0.05 % cream       pantoprazole (PROTONIX) 40 MG tablet Take 1 tablet by mouth every morning (before breakfast) 30 tablet 2    metroNIDAZOLE (METROGEL) 0.75 % gel       albuterol sulfate  (90 Base) MCG/ACT inhaler Inhale 2 puffs into the lungs every 6 hours as needed for Wheezing 1 Inhaler 3    estradiol (CLIMARA) 0.1 MG/24HR APPLY 1 PATCH TWICE WEEKLY 24 patch 3    mometasone (NASONEX) 50 MCG/ACT nasal spray 2 sprays by Nasal route daily      Vitamin D (CHOLECALCIFEROL) 1000 UNITS CAPS capsule Take 2 capsules by mouth daily. 30 capsule     calcium carbonate 600 MG TABS tablet Take 1 tablet by mouth daily. No current facility-administered medications on file prior to visit.       Past Surgical History:   Procedure Laterality Date    APPENDECTOMY      COLONOSCOPY      HYSTERECTOMY  2004    total    KNEE ARTHROSCOPY  age 22    LAPAROSCOPIC APPENDECTOMY N/A 2/4/2020    APPENDECTOMY LAPAROSCOPIC performed by Vamsi Harkins MD at [address] [place] [address] N/A 10/3/2018    LAPAROSCOPY EXPLORATORY performed by Vamsi Harkins MD at [address] [place] [address] POLYPECTOMY  2010    colon, stomach    RI RECTUM SURGERY PROCEDURE UNLISTED N/A 10/3/2018 Abdomen is soft. Tenderness: There is no abdominal tenderness. Musculoskeletal:         General: No swelling or tenderness. Skin:     General: Skin is warm and dry. Neurological:      Mental Status: She is alert and oriented to person, place, and time. Psychiatric:         Behavior: Behavior normal.         Assessment & Plan:       Diagnosis Orders   1. Gastroesophageal reflux disease without esophagitis       No orders of the defined types were placed in this encounter. Orders Placed This Encounter   Medications    DISCONTD: lidocaine viscous hcl (XYLOCAINE) 2 % SOLN solution     Sig: Take 15 mLs by mouth as needed for Irritation     Dispense:  100 mL     Refill:  0    lidocaine viscous hcl (XYLOCAINE) 2 % SOLN solution     Sig: Take 15 mLs by mouth as needed for Irritation     Dispense:  100 mL     Refill:  0     Medications Discontinued During This Encounter   Medication Reason    lidocaine viscous hcl (XYLOCAINE) 2 % SOLN solution REORDER     Return in about 2 weeks (around 3/10/2020). Reviewed with the patient: currentclinical status, medications, activities and diet. Side effects, adverse effects of the medicationprescribed today, as well as treatment plan/ rationale and result expectations havebeen discussed with the patient who expresses understanding and desires to proceed. Pt instructions reviewed and given to patient.     Close follow up to evaluate treatment resultsand for coordination of care. I have reviewed the patient's medical historyin detail and updated the computerized patient record.     PAUL Evans - CNP

## 2020-02-26 ENCOUNTER — OFFICE VISIT (OUTPATIENT)
Dept: GASTROENTEROLOGY | Age: 65
End: 2020-02-26
Payer: COMMERCIAL

## 2020-02-26 VITALS
HEART RATE: 67 BPM | WEIGHT: 139.8 LBS | BODY MASS INDEX: 24.77 KG/M2 | RESPIRATION RATE: 16 BRPM | HEIGHT: 63 IN | OXYGEN SATURATION: 97 %

## 2020-02-26 PROCEDURE — G8420 CALC BMI NORM PARAMETERS: HCPCS | Performed by: SPECIALIST

## 2020-02-26 PROCEDURE — G8427 DOCREV CUR MEDS BY ELIG CLIN: HCPCS | Performed by: SPECIALIST

## 2020-02-26 PROCEDURE — G8482 FLU IMMUNIZE ORDER/ADMIN: HCPCS | Performed by: SPECIALIST

## 2020-02-26 PROCEDURE — 1036F TOBACCO NON-USER: CPT | Performed by: SPECIALIST

## 2020-02-26 PROCEDURE — 3017F COLORECTAL CA SCREEN DOC REV: CPT | Performed by: SPECIALIST

## 2020-02-26 PROCEDURE — 99203 OFFICE O/P NEW LOW 30 MIN: CPT | Performed by: SPECIALIST

## 2020-02-26 PROCEDURE — 1111F DSCHRG MED/CURRENT MED MERGE: CPT | Performed by: SPECIALIST

## 2020-02-26 ASSESSMENT — ENCOUNTER SYMPTOMS
VOMITING: 0
ABDOMINAL DISTENTION: 0
NAUSEA: 0
GASTROINTESTINAL NEGATIVE: 1
ABDOMINAL PAIN: 0
DIARRHEA: 0
ANAL BLEEDING: 0
EYES NEGATIVE: 1
BLOOD IN STOOL: 0
RECTAL PAIN: 0
CONSTIPATION: 0
RESPIRATORY NEGATIVE: 1

## 2020-02-26 NOTE — PROGRESS NOTES
level: None   Occupational History    None   Social Needs    Financial resource strain: None    Food insecurity:     Worry: None     Inability: None    Transportation needs:     Medical: None     Non-medical: None   Tobacco Use    Smoking status: Never Smoker    Smokeless tobacco: Never Used   Substance and Sexual Activity    Alcohol use: Yes     Comment: socially    Drug use: No    Sexual activity: Yes     Partners: Male   Lifestyle    Physical activity:     Days per week: None     Minutes per session: None    Stress: None   Relationships    Social connections:     Talks on phone: None     Gets together: None     Attends [employer] service: None     Active member of club or organization: None     Attends meetings of clubs or organizations: None     Relationship status: None    Intimate partner violence:     Fear of current or ex partner: None     Emotionally abused: None     Physically abused: None     Forced sexual activity: None   Other Topics Concern    None   Social History Narrative    None       Pulse 67, resp. rate 16, height 5' 3\" (1.6 m), weight 139 lb 12.8 oz (63.4 kg), SpO2 97 %, not currently breastfeeding. Physical Exam  Constitutional:       Appearance: She is well-developed. HENT:      Head: Normocephalic and atraumatic. Eyes:      Conjunctiva/sclera: Conjunctivae normal.      Pupils: Pupils are equal, round, and reactive to light. Neck:      Musculoskeletal: Normal range of motion. Cardiovascular:      Rate and Rhythm: Normal rate. Pulmonary:      Effort: Pulmonary effort is normal.   Abdominal:      General: Bowel sounds are normal.      Palpations: Abdomen is soft. Musculoskeletal: Normal range of motion. Skin:     General: Skin is warm. Neurological:      Mental Status: She is alert.          Laboratory, Pathology, Radiology reviewed indetail with relevant important investigations summarized below:  Lab Results   Component Value Date    WBC 8.1 02/17/2020    HGB 13.3 02/17/2020    HCT 39.4 02/17/2020    MCV 93.5 02/17/2020     02/17/2020     Lab Results   Component Value Date    ALT 11 02/03/2020    AST 18 02/03/2020    ALKPHOS 64 02/03/2020    BILITOT 0.4 02/03/2020       Xr Chest Standard (2 Vw)    Result Date: 2/18/2020  EXAMINATION: XR CHEST (2 VW) CLINICAL HISTORY: CHEST PAIN COMPARISONS: None available. FINDINGS: Osseous structures intact. Cardiopericardial silhouette is normal. Pulmonary vasculature is normal. Lungs are clear. NO ACUTE CARDIOPULMONARY DISEASE. Ct Abdomen Pelvis W Iv Contrast Additional Contrast? None    Result Date: 2/3/2020  Examination: CT ABDOMEN PELVIS W IV CONTRAST Indication:  Abdominal pain Technique: Multiple serial axial images was performed through the abdomen and pelvis utilizing 100cc of Optiray 370. Images were reconstructed in the axial and coronal and sagittal planes. Comparison:  No comparison is available. Findings: The visualized basal lungs show no focal parenchymal abnormalities. The liver, gallbladder, spleen, pancreas, adrenals, kidneys are unremarkable. Large and small bowel show no sign of obstruction. .  No diverticulitis. The appendix is dilated. It measures 9 mm. There is a appendicolith at the tip of the appendix. There is surrounding periappendiceal stranding. Radiographic findings of acute appendicitis. Uterus is surgically absent. No free air. No free fluid. The visualized abdominal aorta is of normal size and caliber. No significant retroperitoneal adenopathy. Visualized osseous structures are grossly unremarkable. 1. THE APPENDIX IS DILATED. IT MEASURES 9 MM THERE IS A APPENDICOLITH AT THE TIP OF THE APPENDIX. THERE IS SURROUNDING PERIAPPENDICEAL STRANDING. RADIOGRAPHIC FINDINGS OF ACUTE APPENDICITIS.  DR. Rex Gilmore OF THE EMERGENCY ROOM WAS NOTIFIED IMMEDIATELY OF THE ABOVE FINDINGS AT [place] [address] 3, 2020 All CT scans at this facility use dose modulation, iterative reconstruction, and/or weight based dosing when appropriate to reduce radiation dose to as low as reasonably achievable. Endoscopic investigations:     Assessmentand Plan:  62 y.o. female with history of GERD and one episode of passing dark stool. Will schedule EGD. Diagnosis Orders   1. Melena  EGD   2. Gastroesophageal reflux disease, esophagitis presence not specified  EGD     Return in about 1 week (around 3/4/2020). Sadia Bess MD   Staff Gastroenterologist  [facility]    Please note this report has been partially produced using speech recognition software and may cause contain errors related to thatsystem including grammar, punctuation and spelling as well as words and phrases that may seem inappropriate. If there are questions or concerns please feel free to contact me to clarify.

## 2020-02-27 ENCOUNTER — ANESTHESIA (OUTPATIENT)
Dept: OPERATING ROOM | Age: 65
End: 2020-02-27
Payer: COMMERCIAL

## 2020-02-27 ENCOUNTER — HOSPITAL ENCOUNTER (OUTPATIENT)
Age: 65
Setting detail: OUTPATIENT SURGERY
Discharge: HOME | End: 2020-02-27
Attending: SPECIALIST | Admitting: SPECIALIST
Payer: COMMERCIAL

## 2020-02-27 ENCOUNTER — ANESTHESIA EVENT (OUTPATIENT)
Dept: OPERATING ROOM | Age: 65
End: 2020-02-27
Payer: COMMERCIAL

## 2020-02-27 VITALS
DIASTOLIC BLOOD PRESSURE: 54 MMHG | RESPIRATION RATE: 9 BRPM | SYSTOLIC BLOOD PRESSURE: 103 MMHG | OXYGEN SATURATION: 100 %

## 2020-02-27 VITALS
SYSTOLIC BLOOD PRESSURE: 113 MMHG | RESPIRATION RATE: 16 BRPM | TEMPERATURE: 98.4 F | WEIGHT: 137 LBS | DIASTOLIC BLOOD PRESSURE: 65 MMHG | BODY MASS INDEX: 24.27 KG/M2 | HEART RATE: 55 BPM | OXYGEN SATURATION: 99 % | HEIGHT: 63 IN

## 2020-02-27 PROCEDURE — 3609017100 HC EGD: Performed by: SPECIALIST

## 2020-02-27 PROCEDURE — 2500000003 HC RX 250 WO HCPCS: Performed by: NURSE PRACTITIONER

## 2020-02-27 PROCEDURE — 2709999900 HC NON-CHARGEABLE SUPPLY: Performed by: SPECIALIST

## 2020-02-27 PROCEDURE — 6370000000 HC RX 637 (ALT 250 FOR IP): Performed by: SPECIALIST

## 2020-02-27 PROCEDURE — 6360000002 HC RX W HCPCS

## 2020-02-27 PROCEDURE — 2580000003 HC RX 258: Performed by: SPECIALIST

## 2020-02-27 PROCEDURE — 2580000003 HC RX 258: Performed by: NURSE PRACTITIONER

## 2020-02-27 PROCEDURE — 7100000011 HC PHASE II RECOVERY - ADDTL 15 MIN: Performed by: SPECIALIST

## 2020-02-27 PROCEDURE — 43235 EGD DIAGNOSTIC BRUSH WASH: CPT | Performed by: SPECIALIST

## 2020-02-27 PROCEDURE — 3700000000 HC ANESTHESIA ATTENDED CARE: Performed by: SPECIALIST

## 2020-02-27 PROCEDURE — 7100000010 HC PHASE II RECOVERY - FIRST 15 MIN: Performed by: SPECIALIST

## 2020-02-27 RX ORDER — SIMETHICONE 20 MG/.3ML
EMULSION ORAL PRN
Status: DISCONTINUED | OUTPATIENT
Start: 2020-02-27 | End: 2020-02-27 | Stop reason: ALTCHOICE

## 2020-02-27 RX ORDER — ONDANSETRON 2 MG/ML
4 INJECTION INTRAMUSCULAR; INTRAVENOUS
Status: DISCONTINUED | OUTPATIENT
Start: 2020-02-27 | End: 2020-02-27 | Stop reason: HOSPADM

## 2020-02-27 RX ORDER — SODIUM CHLORIDE 0.9 % (FLUSH) 0.9 %
10 SYRINGE (ML) INJECTION EVERY 12 HOURS SCHEDULED
Status: DISCONTINUED | OUTPATIENT
Start: 2020-02-27 | End: 2020-02-27 | Stop reason: HOSPADM

## 2020-02-27 RX ORDER — SODIUM CHLORIDE 0.9 % (FLUSH) 0.9 %
10 SYRINGE (ML) INJECTION PRN
Status: DISCONTINUED | OUTPATIENT
Start: 2020-02-27 | End: 2020-02-27 | Stop reason: HOSPADM

## 2020-02-27 RX ORDER — MAGNESIUM HYDROXIDE 1200 MG/15ML
LIQUID ORAL PRN
Status: DISCONTINUED | OUTPATIENT
Start: 2020-02-27 | End: 2020-02-27 | Stop reason: ALTCHOICE

## 2020-02-27 RX ORDER — SODIUM CHLORIDE, SODIUM LACTATE, POTASSIUM CHLORIDE, CALCIUM CHLORIDE 600; 310; 30; 20 MG/100ML; MG/100ML; MG/100ML; MG/100ML
INJECTION, SOLUTION INTRAVENOUS CONTINUOUS
Status: DISCONTINUED | OUTPATIENT
Start: 2020-02-27 | End: 2020-02-27 | Stop reason: HOSPADM

## 2020-02-27 RX ORDER — PROPOFOL 10 MG/ML
INJECTION, EMULSION INTRAVENOUS PRN
Status: DISCONTINUED | OUTPATIENT
Start: 2020-02-27 | End: 2020-02-27 | Stop reason: SDUPTHER

## 2020-02-27 RX ORDER — LIDOCAINE HYDROCHLORIDE 10 MG/ML
1 INJECTION, SOLUTION EPIDURAL; INFILTRATION; INTRACAUDAL; PERINEURAL
Status: COMPLETED | OUTPATIENT
Start: 2020-02-27 | End: 2020-02-27

## 2020-02-27 RX ADMIN — PROPOFOL 70 MG: 10 INJECTION, EMULSION INTRAVENOUS at 10:59

## 2020-02-27 RX ADMIN — SODIUM CHLORIDE, POTASSIUM CHLORIDE, SODIUM LACTATE AND CALCIUM CHLORIDE: 600; 310; 30; 20 INJECTION, SOLUTION INTRAVENOUS at 10:02

## 2020-02-27 RX ADMIN — LIDOCAINE HYDROCHLORIDE 30 MG: 10 INJECTION, SOLUTION EPIDURAL; INFILTRATION; INTRACAUDAL; PERINEURAL at 10:59

## 2020-02-27 ASSESSMENT — PAIN DESCRIPTION - DESCRIPTORS: DESCRIPTORS: BURNING;ACHING;CONSTANT

## 2020-02-27 ASSESSMENT — PULMONARY FUNCTION TESTS
PIF_VALUE: 0

## 2020-02-27 ASSESSMENT — PAIN - FUNCTIONAL ASSESSMENT: PAIN_FUNCTIONAL_ASSESSMENT: 0-10

## 2020-02-27 NOTE — ANESTHESIA PRE PROCEDURE
Department of Anesthesiology  Preprocedure Note       Name:  Stephanie Valle   Age:  62 y.o.  :  1955                                          MRN:  [id number]         Date:  2020      Surgeon: Gary Aquino):  Abena Ryan MD    Procedure: EGD ESOPHAGOGASTRODUODENOSCOPY (N/A )    Medications prior to admission:   Prior to Admission medications    Medication Sig Start Date End Date Taking? Authorizing Provider   doxycycline monohydrate (MONODOX) 100 MG capsule  20  Yes Historical Provider, MD   pantoprazole (PROTONIX) 40 MG tablet Take 1 tablet by mouth every morning (before breakfast) 20  Yes PAUL Love CNP   metroNIDAZOLE (METROGEL) 0.75 % gel  20  Yes Historical Provider, MD   estradiol (CLIMARA) 0.1 MG/24HR APPLY 1 PATCH TWICE WEEKLY 19  Yes Mounika Quinones DO   calcium carbonate 600 MG TABS tablet Take 1 tablet by mouth daily. Yes Historical Provider, MD   lidocaine viscous hcl (XYLOCAINE) 2 % SOLN solution Take 15 mLs by mouth as needed for Irritation 20   PAUL Love CNP   clobetasol (TEMOVATE) 0.05 % cream  20   Historical Provider, MD   albuterol sulfate  (90 Base) MCG/ACT inhaler Inhale 2 puffs into the lungs every 6 hours as needed for Wheezing 2/3/20   PAUL Love CNP   mometasone (NASONEX) 50 MCG/ACT nasal spray 2 sprays by Nasal route daily    Historical Provider, MD   Vitamin D (CHOLECALCIFEROL) 1000 UNITS CAPS capsule Take 2 capsules by mouth daily.  14   Noah Sousa MD       Current medications:    Current Facility-Administered Medications   Medication Dose Route Frequency Provider Last Rate Last Dose    lactated ringers infusion   Intravenous Continuous PAUL Rosario  mL/hr at 20 1002      sodium chloride flush 0.9 % injection 10 mL  10 mL Intravenous 2 times per day Jamescandy Fung APRN - CNP        sodium chloride flush 0.9 % injection 10 mL  10 mL Intravenous PRN James Fung 02/27/20 120/68   02/25/20 122/70   02/18/20 110/66       NPO Status: Time of last liquid consumption: 0700(Sip of water with med.)                        Time of last solid consumption: 2300                        Date of last liquid consumption: 02/27/20                        Date of last solid food consumption: 02/26/20    BMI:   Wt Readings from Last 3 Encounters:   02/27/20 137 lb (62.1 kg)   02/26/20 139 lb 12.8 oz (63.4 kg)   02/25/20 140 lb (63.5 kg)     Body mass index is 24.27 kg/m². CBC:   Lab Results   Component Value Date    WBC 8.1 02/17/2020    RBC 4.22 02/17/2020    HGB 13.3 02/17/2020    HCT 39.4 02/17/2020    MCV 93.5 02/17/2020    RDW 13.3 02/17/2020     02/17/2020       CMP:   Lab Results   Component Value Date     02/17/2020    K 3.8 02/17/2020    CL 96 02/17/2020    CO2 27 02/17/2020    BUN 14 02/17/2020    CREATININE 0.64 02/17/2020    GFRAA >60.0 02/17/2020    LABGLOM >60.0 02/17/2020    GLUCOSE 94 02/17/2020    PROT 7.7 02/03/2020    CALCIUM 9.1 02/17/2020    BILITOT 0.4 02/03/2020    ALKPHOS 64 02/03/2020    AST 18 02/03/2020    ALT 11 02/03/2020       POC Tests: No results for input(s): POCGLU, POCNA, POCK, POCCL, POCBUN, POCHEMO, POCHCT in the last 72 hours.     Coags: No results found for: PROTIME, INR, APTT    HCG (If Applicable): No results found for: PREGTESTUR, PREGSERUM, HCG, HCGQUANT     ABGs: No results found for: PHART, PO2ART, RUB3EBO, MRF7GQN, BEART, Q4GXNMFM     Type & Screen (If Applicable):  No results found for: LABABO, [address]    Anesthesia Evaluation  Patient summary reviewed and Nursing notes reviewed  Airway: Mallampati: I  TM distance: >3 FB   Neck ROM: full  Mouth opening: > = 3 FB Dental:    (+) other  Comment: Right permanent bridge upper right    Pulmonary:normal exam    (+) asthma: allergic asthma,                            Cardiovascular:  Exercise tolerance: good (>4 METS),         NYHA Classification: I                 Beta Blocker:  Not on

## 2020-02-27 NOTE — ANESTHESIA POSTPROCEDURE EVALUATION
Department of Anesthesiology  Postprocedure Note    Patient: Deneen Palencia  MRN: [id number]  [place]: 1955  Date of evaluation: 2/27/2020  Time:  11:05 AM     Procedure Summary     Date:  02/27/20 Room / Location:  [facility] OR [address] [place] [address]    Anesthesia Start:  1052 Anesthesia Stop:  1105    Procedure:  EGD ESOPHAGOGASTRODUODENOSCOPY (N/A ) Diagnosis:  (Melena K92.1, Gastroesophageal reflux disease; K21.9,)    Surgeon:  Raeann Pnoce MD Responsible Provider:  Raynald Kanner, APRN - CRNA    Anesthesia Type:  MAC ASA Status:  2          Anesthesia Type: MAC    Estefany Phase I: Estefany Score: 10    Estefany Phase II:      Last vitals: Reviewed and per EMR flowsheets.        Anesthesia Post Evaluation    Patient location during evaluation: bedside  Patient participation: complete - patient participated  Level of consciousness: awake  Pain score: 0  Airway patency: patent  Nausea & Vomiting: no nausea and no vomiting  Complications: no  Cardiovascular status: blood pressure returned to baseline  Respiratory status: acceptable  Hydration status: euvolemic

## 2020-02-27 NOTE — OP NOTE
Endoscopy Note    Patient: Oliver Zelaya  YOB: 1955  MRN: [id number]  Date of Procedure: 2/27/2020    Pre-Op Diagnosis: Melena K92.1, Gastroesophageal reflux disease; K21.9,    Post-Op Diagnosis: Normal EGD       Procedure(s):  EGD ESOPHAGOGASTRODUODENOSCOPY    Anesthesia: Monitor Anesthesia Care    Surgeon(s):  Jazmyn Lino MD    Staff:  Scrub Person First: Jennifer Hernandez     Estimated Blood Loss: None    Specimens:   * No specimens in log *    Indications: This is a 62y.o. year old female who presents for upper endoscopy with h/o gastroesophageal reflux disease which is not adequately controlled. Patient also reports having one episode of melena. She recently had appendectomy. Rohit Brown Description of Procedure:  Informed consent was obtained from the patient after explanation of indications, benefits and possible risks and complications of the procedure. The patient was then taken to the endoscopy suite, placed in the left lateral decubitus position and the above IV sedation was administrered. Olympus video gastroscope was inserted into the esophagus and advanced up to the distal duodenum. Esophageal mucosa is normal.  GE junction is about 35 cm from the incisors. Stomach fundus body antrum and pylorus was examined and was normal.  Duodenal bulb and post bulbar duodenum was examined and was normal.    The patient tolerated the procedure well and was taken to the post anesthesia care unit in good condition. Impression: Normal EGD    Recommendations: Follow-up in the office.     Jazmyn Lino MD  [facility]

## 2020-02-28 ASSESSMENT — ENCOUNTER SYMPTOMS
WHEEZING: 0
RECTAL PAIN: 0
COUGH: 0
ABDOMINAL PAIN: 0
CHOKING: 0
ANAL BLEEDING: 0
DIARRHEA: 0
VOMITING: 0
CONSTIPATION: 0
NAUSEA: 0
SORE THROAT: 0
TROUBLE SWALLOWING: 0
SHORTNESS OF BREATH: 0
BLOOD IN STOOL: 0

## 2020-03-06 ENCOUNTER — OFFICE VISIT (OUTPATIENT)
Dept: GASTROENTEROLOGY | Age: 65
End: 2020-03-06
Payer: COMMERCIAL

## 2020-03-06 VITALS
WEIGHT: 141.6 LBS | BODY MASS INDEX: 25.09 KG/M2 | HEART RATE: 75 BPM | RESPIRATION RATE: 16 BRPM | HEIGHT: 63 IN | OXYGEN SATURATION: 98 %

## 2020-03-06 PROCEDURE — G8427 DOCREV CUR MEDS BY ELIG CLIN: HCPCS | Performed by: SPECIALIST

## 2020-03-06 PROCEDURE — G8482 FLU IMMUNIZE ORDER/ADMIN: HCPCS | Performed by: SPECIALIST

## 2020-03-06 PROCEDURE — 99213 OFFICE O/P EST LOW 20 MIN: CPT | Performed by: SPECIALIST

## 2020-03-06 PROCEDURE — 1036F TOBACCO NON-USER: CPT | Performed by: SPECIALIST

## 2020-03-06 PROCEDURE — G8419 CALC BMI OUT NRM PARAM NOF/U: HCPCS | Performed by: SPECIALIST

## 2020-03-06 PROCEDURE — 3017F COLORECTAL CA SCREEN DOC REV: CPT | Performed by: SPECIALIST

## 2020-03-06 ASSESSMENT — ENCOUNTER SYMPTOMS
ABDOMINAL PAIN: 0
EYES NEGATIVE: 1
VOMITING: 0
CONSTIPATION: 0
BLOOD IN STOOL: 0
DIARRHEA: 0
RESPIRATORY NEGATIVE: 1
NAUSEA: 0
GASTROINTESTINAL NEGATIVE: 1
ANAL BLEEDING: 0
RECTAL PAIN: 0
ABDOMINAL DISTENTION: 0

## 2020-03-06 NOTE — PROGRESS NOTES
Laboratory, Pathology, Radiology reviewed in detail with relevantimportant investigations summarized below:    Recent Labs     02/17/20  2230   WBC 8.1   HGB 13.3   HCT 39.4   MCV 93.5        Lab Results   Component Value Date    ALT 11 02/03/2020    AST 18 02/03/2020    ALKPHOS 64 02/03/2020    BILITOT 0.4 02/03/2020     Xr Chest Standard (2 Vw)    Result Date: 2/18/2020  EXAMINATION: XR CHEST (2 VW) CLINICAL HISTORY: CHEST PAIN COMPARISONS: None available. FINDINGS: Osseous structures intact. Cardiopericardial silhouette is normal. Pulmonary vasculature is normal. Lungs are clear. NO ACUTE CARDIOPULMONARY DISEASE. Endoscopic investigations:    Assessment and Plan:  Aron Nicole 62 y.o. female for follow up. Dyspepsia etiology unclear. No significant response to PPI, functional dyspepsia or possible gallbladder disease. Right upper quadrant ultrasound. Return after 3 to 4 weeks   Diagnosis Orders   1. Dyspepsia  US GALLBLADDER RUQ       Return in about 4 weeks (around 4/3/2020). Mague Fox MD   StaffGastroenterologist  [facility]    Please note this report has been partially produced using speech recognitionsoftware  and may cause contain errors related to that system including grammar, punctuation and spelling as well as words andphrases that may seem inappropriate. If there are questions or concerns please feel free to contact me to clarify.

## 2020-03-11 ENCOUNTER — HOSPITAL ENCOUNTER (OUTPATIENT)
Dept: ULTRASOUND IMAGING | Age: 65
Discharge: HOME | End: 2020-03-13
Payer: COMMERCIAL

## 2020-03-11 PROCEDURE — 76705 ECHO EXAM OF ABDOMEN: CPT

## 2020-03-16 ENCOUNTER — OFFICE VISIT (OUTPATIENT)
Dept: FAMILY MEDICINE CLINIC | Age: 65
End: 2020-03-16
Payer: COMMERCIAL

## 2020-03-16 VITALS
OXYGEN SATURATION: 99 % | WEIGHT: 140 LBS | TEMPERATURE: 98.2 F | HEART RATE: 56 BPM | DIASTOLIC BLOOD PRESSURE: 60 MMHG | SYSTOLIC BLOOD PRESSURE: 120 MMHG | BODY MASS INDEX: 24.8 KG/M2 | RESPIRATION RATE: 15 BRPM | HEIGHT: 63 IN

## 2020-03-16 PROCEDURE — G8427 DOCREV CUR MEDS BY ELIG CLIN: HCPCS | Performed by: FAMILY MEDICINE

## 2020-03-16 PROCEDURE — G8482 FLU IMMUNIZE ORDER/ADMIN: HCPCS | Performed by: FAMILY MEDICINE

## 2020-03-16 PROCEDURE — 99213 OFFICE O/P EST LOW 20 MIN: CPT | Performed by: FAMILY MEDICINE

## 2020-03-16 PROCEDURE — G8420 CALC BMI NORM PARAMETERS: HCPCS | Performed by: FAMILY MEDICINE

## 2020-03-16 PROCEDURE — 3017F COLORECTAL CA SCREEN DOC REV: CPT | Performed by: FAMILY MEDICINE

## 2020-03-16 PROCEDURE — 1036F TOBACCO NON-USER: CPT | Performed by: FAMILY MEDICINE

## 2020-03-16 RX ORDER — BRIMONIDINE TARTRATE 5 MG/G
GEL TOPICAL
Qty: 30 G | Refills: 2 | Status: ON HOLD | OUTPATIENT
Start: 2020-03-16 | End: 2021-03-03

## 2020-03-16 ASSESSMENT — ENCOUNTER SYMPTOMS
RHINORRHEA: 0
CHEST TIGHTNESS: 0
RESPIRATORY NEGATIVE: 1
COUGH: 0
GASTROINTESTINAL NEGATIVE: 1
EYES NEGATIVE: 1

## 2020-03-16 NOTE — PROGRESS NOTES
Social History     Socioeconomic History    Marital status:      Spouse name: None    Number of children: None    Years of education: None    Highest education level: None   Occupational History    None   Social Needs    Financial resource strain: None    Food insecurity     Worry: None     Inability: None    Transportation needs     Medical: None     Non-medical: None   Tobacco Use    Smoking status: Never Smoker    Smokeless tobacco: Never Used   Substance and Sexual Activity    Alcohol use: Yes     Comment: socially    Drug use: No    Sexual activity: Yes     Partners: Male   Lifestyle    Physical activity     Days per week: None     Minutes per session: None    Stress: None   Relationships    Social connections     Talks on phone: None     Gets together: None     Attends Evangelical service: None     Active member of club or organization: None     Attends meetings of clubs or organizations: None     Relationship status: None    Intimate partner violence     Fear of current or ex partner: None     Emotionally abused: None     Physically abused: None     Forced sexual activity: None   Other Topics Concern    None   Social History Narrative    None     Current Outpatient Medications   Medication Sig Dispense Refill    Brimonidine Tartrate (MIRVASO) 0.33 % GEL Apply pea sized amount daily to face 30 g 2    lidocaine viscous hcl (XYLOCAINE) 2 % SOLN solution Take 15 mLs by mouth as needed for Irritation 100 mL 0    doxycycline monohydrate (MONODOX) 100 MG capsule       pantoprazole (PROTONIX) 40 MG tablet Take 1 tablet by mouth every morning (before breakfast) 30 tablet 2    metroNIDAZOLE (METROGEL) 0.75 % gel       albuterol sulfate  (90 Base) MCG/ACT inhaler Inhale 2 puffs into the lungs every 6 hours as needed for Wheezing 1 Inhaler 3    estradiol (CLIMARA) 0.1 MG/24HR APPLY 1 PATCH TWICE WEEKLY 24 patch 3    mometasone (NASONEX) 50 MCG/ACT nasal spray 2 sprays by Nasal route daily      Vitamin D (CHOLECALCIFEROL) 1000 UNITS CAPS capsule Take 2 capsules by mouth daily. 30 capsule     calcium carbonate 600 MG TABS tablet Take 1 tablet by mouth daily. No current facility-administered medications for this visit. Current Outpatient Medications on File Prior to Visit   Medication Sig Dispense Refill    lidocaine viscous hcl (XYLOCAINE) 2 % SOLN solution Take 15 mLs by mouth as needed for Irritation 100 mL 0    doxycycline monohydrate (MONODOX) 100 MG capsule       pantoprazole (PROTONIX) 40 MG tablet Take 1 tablet by mouth every morning (before breakfast) 30 tablet 2    metroNIDAZOLE (METROGEL) 0.75 % gel       albuterol sulfate  (90 Base) MCG/ACT inhaler Inhale 2 puffs into the lungs every 6 hours as needed for Wheezing 1 Inhaler 3    estradiol (CLIMARA) 0.1 MG/24HR APPLY 1 PATCH TWICE WEEKLY 24 patch 3    mometasone (NASONEX) 50 MCG/ACT nasal spray 2 sprays by Nasal route daily      Vitamin D (CHOLECALCIFEROL) 1000 UNITS CAPS capsule Take 2 capsules by mouth daily. 30 capsule     calcium carbonate 600 MG TABS tablet Take 1 tablet by mouth daily. No current facility-administered medications on file prior to visit.       Allergies   Allergen Reactions    Sulfamethoxazole-Trimethoprim Itching and Rash     Health Maintenance   Topic Date Due    Shingles Vaccine (1 of 2) 05/04/2006    Pneumococcal 0-64 years Vaccine (1 of 1 - PPSV23) 07/22/2016    Breast cancer screen  09/04/2021    Cervical cancer screen  09/19/2022    Lipid screen  06/29/2023    DTaP/Tdap/Td vaccine (3 - Td) 08/13/2023    Colon cancer screen colonoscopy  11/05/2025    Flu vaccine  Completed    Hepatitis C screen  Addressed    HIV screen  Addressed    Hepatitis A vaccine  Aged Out    Hepatitis B vaccine  Aged Out    Hib vaccine  Aged Out    Meningococcal (ACWY) vaccine  Aged Out       Review of Systems     Review of Systems   Constitutional: Negative for activity change, Gastroesophageal reflux disease without esophagitis       Problem List     GERD (gastroesophageal reflux disease) - Primary    Relevant Medications    pantoprazole (PROTONIX) 40 MG tablet          Plan  Possible gastritis from doxi will observe fpr now prn   Orders Placed This Encounter   Medications    Brimonidine Tartrate (MIRVASO) 0.33 % GEL     Sig: Apply pea sized amount daily to face     Dispense:  30 g     Refill:  2     No follow-ups on file.   Remy Montero MD

## 2020-08-25 ENCOUNTER — OFFICE VISIT (OUTPATIENT)
Dept: FAMILY MEDICINE CLINIC | Age: 65
End: 2020-08-25
Payer: COMMERCIAL

## 2020-08-25 VITALS
WEIGHT: 144 LBS | TEMPERATURE: 97.2 F | DIASTOLIC BLOOD PRESSURE: 60 MMHG | OXYGEN SATURATION: 98 % | BODY MASS INDEX: 25.52 KG/M2 | RESPIRATION RATE: 12 BRPM | SYSTOLIC BLOOD PRESSURE: 110 MMHG | HEART RATE: 90 BPM | HEIGHT: 63 IN

## 2020-08-25 PROBLEM — N81.5 VAGINAL ENTEROCELE: Status: ACTIVE | Noted: 2018-10-03

## 2020-08-25 PROCEDURE — 99213 OFFICE O/P EST LOW 20 MIN: CPT | Performed by: NURSE PRACTITIONER

## 2020-08-25 PROCEDURE — G8427 DOCREV CUR MEDS BY ELIG CLIN: HCPCS | Performed by: NURSE PRACTITIONER

## 2020-08-25 PROCEDURE — G8419 CALC BMI OUT NRM PARAM NOF/U: HCPCS | Performed by: NURSE PRACTITIONER

## 2020-08-25 PROCEDURE — 3017F COLORECTAL CA SCREEN DOC REV: CPT | Performed by: NURSE PRACTITIONER

## 2020-08-25 PROCEDURE — 1036F TOBACCO NON-USER: CPT | Performed by: NURSE PRACTITIONER

## 2020-08-25 RX ORDER — AMOXICILLIN AND CLAVULANATE POTASSIUM 875; 125 MG/1; MG/1
1 TABLET, FILM COATED ORAL 2 TIMES DAILY
Qty: 20 TABLET | Refills: 0 | Status: SHIPPED | OUTPATIENT
Start: 2020-08-25 | End: 2020-09-04

## 2020-08-25 NOTE — PROGRESS NOTES
Subjective    Cesar Roy, 60 y.o. female presents today with:  Chief Complaint   Patient presents with    Otalgia     C/o ear pain 3x days      URI    This is a new problem. Episode onset: 3d ago. The problem has been gradually worsening. Associated symptoms include congestion, ear pain (R) and headaches. Pertinent negatives include no abdominal pain, chest pain, coughing, nausea, neck pain, plugged ear sensation, rash, sore throat, vomiting or wheezing. Associated symptoms comments: + denies anosmia/ dysgeusia. She has tried antihistamine (fluticasone, loratadine, mucinex, sudafed, tylenol/ motrin) for the symptoms. The treatment provided no relief. Pt denies any known exposures to someone who has tested positive or is under quarantine due to COVID-19 within the last 14 days. Review of Systems   Constitutional: Negative for chills and fever. HENT: Positive for congestion, ear pain (R) and postnasal drip. Negative for sore throat. Respiratory: Negative for cough, chest tightness, shortness of breath and wheezing. Cardiovascular: Negative for chest pain. Gastrointestinal: Negative for abdominal pain, nausea and vomiting. Musculoskeletal: Negative for neck pain. Skin: Negative for rash. Neurological: Positive for headaches. Negative for dizziness and light-headedness. Objective  Vitals:    08/25/20 1849   BP: 110/60   Site: Right Upper Arm   Position: Sitting   Cuff Size: Small Adult   Pulse: 90   Resp: 12   Temp: 97.2 °F (36.2 °C)   SpO2: 98%   Weight: 144 lb (65.3 kg)   Height: 5' 3\" (1.6 m)     Physical Exam  Vitals signs reviewed. Constitutional:       Appearance: She is well-groomed. She is not toxic-appearing. HENT:      Head: Normocephalic and atraumatic. Jaw: There is normal jaw occlusion. Right Ear: External ear normal. Tenderness present. No drainage. Tympanic membrane is erythematous and bulging. Left Ear: External ear normal. Tenderness present.  Tympanic membrane is erythematous. Tympanic membrane is not bulging. Nose: Mucosal edema and congestion present. Right Nostril: No occlusion. Left Nostril: No occlusion. Mouth/Throat:      Lips: Pink. No lesions. Mouth: Mucous membranes are moist. No oral lesions. Pharynx: Uvula midline. Posterior oropharyngeal erythema present. No oropharyngeal exudate. Tonsils: 0 on the right. 0 on the left. Eyes:      General: Lids are normal. Gaze aligned appropriately. Extraocular Movements: Extraocular movements intact. Conjunctiva/sclera: Conjunctivae normal.      Pupils: Pupils are equal, round, and reactive to light. Neck:      Musculoskeletal: Normal range of motion and neck supple. No neck rigidity. Vascular: No JVD. Trachea: Trachea and phonation normal.   Cardiovascular:      Rate and Rhythm: Normal rate and regular rhythm. Heart sounds: Normal heart sounds. Pulmonary:      Effort: Pulmonary effort is normal. No tachypnea. Breath sounds: Normal breath sounds and air entry. Musculoskeletal: Normal range of motion. General: No tenderness. Right lower leg: No edema. Left lower leg: No edema. Lymphadenopathy:      Cervical: Cervical adenopathy present. Right cervical: Superficial cervical adenopathy present. Left cervical: Superficial cervical adenopathy present. Skin:     General: Skin is warm and dry. Capillary Refill: Capillary refill takes less than 2 seconds. Findings: No rash. Neurological:      General: No focal deficit present. Mental Status: She is alert and oriented to person, place, and time. Mental status is at baseline. Motor: Motor function is intact. Gait: Gait is intact. Psychiatric:         Mood and Affect: Mood and affect normal.         Speech: Speech normal.         Behavior: Behavior normal. Behavior is cooperative.      POC Testing Today: No results found for this visit on 08/25/20. Assessment & Plan   Myah Evans was seen today for otalgia. Diagnoses and all orders for this visit:    ICD-10-CM    1. Right acute otitis media  H66.91 amoxicillin-clavulanate (AUGMENTIN) 875-125 MG per tablet   2. Acute otalgia, bilateral  H92.03      Orders Placed This Encounter   Medications    amoxicillin-clavulanate (AUGMENTIN) 875-125 MG per tablet     Sig: Take 1 tablet by mouth 2 times daily for 10 days     Dispense:  20 tablet     Refill:  0     OTC acetaminophen/ ibuprofen per dose instructions on label prn  Rest, fluids, humidification, warm compress to external ear prn, avoid carbonated/caffeinated beverages    Antibiotic Instructions: Complete the full course of antibiotics as ordered. Take each dose with a small snack or meal to lessen potential GI upset. To prevent antibiotic resistance, please take medication as ordered and for the full duration even if you start to feel better. Consider intake of yogurt or probiotic during antibiotic use and for a few days after to help reduce the risk of developing a secondary infection. Separate the yogurt and antibiotic by at least 1 hour. Avoid alcohol while taking antibiotics. Return for Follow-up with your Primary Care Provider as needed if no improvement or worsening 2-4 days. Side effects and adverse effects of any medication prescribed today, as well as treatment plan/rationale, follow-up care, and result expectations have been discussed with the patient. Myah Evans expresses understanding and wishes to proceed with the plan. Discussed signs and symptoms which require immediate follow-up in ED/call to 911. Understanding verbalized. I have reviewed and updated the electronic medical record.     Yo Bedoya, APRN - CNP

## 2020-08-25 NOTE — PATIENT INSTRUCTIONS
Antibiotic Instructions: Complete the full course of antibiotics as ordered. Take each dose with a small snack or meal to lessen potential GI upset. To prevent antibiotic resistance, please take medication as ordered and for the full duration even if you start to feel better. Consider intake of yogurt or probiotic during antibiotic use and for a few days after to help reduce the risk of developing a secondary infection. Separate the yogurt and antibiotic by at least 1 hour. Avoid alcohol while taking antibiotics. Ok to continue over the counter acetaminophen/ ibuprofen as needed for pain/ fever    Rest, fluids, humidification, avoid carbonated/caffeinated beverages    Follow-up in 2-4 days if not improving or as needed    Patient Education     Earache: Care Instructions  Your Care Instructions     Even though infection is a common cause of ear pain, not all ear pain means an infection. If you have ear pain and don't have an infection, it could be because of a jaw problem, such as temporomandibular joint (TMJ) pain. Or it could be because of a neck problem. When ear discomfort or pain is mild or comes and goes without other symptoms, home treatment may be all you need. Follow-up care is a key part of your treatment and safety. Be sure to make and go to all appointments, and call your doctor if you are having problems. It's also a good idea to know your test results and keep a list of the medicines you take. How can you care for yourself at home? · Apply heat on the ear to ease pain. To apply heat, put a warm water bottle, a heating pad set on low, or a warm cloth on your ear. Do not go to sleep with a heating pad on your skin. · Take an over-the-counter pain medicine, such as acetaminophen (Tylenol), ibuprofen (Advil, Motrin), or naproxen (Aleve). Be safe with medicines. Read and follow all instructions on the label. · Do not take two or more pain medicines at the same time unless the doctor told you to. Many pain medicines have acetaminophen, which is Tylenol. Too much acetaminophen (Tylenol) can be harmful. · Never insert anything, such as a cotton swab or a santino pin, into the ear. When should you call for help? Call your doctor now or seek immediate medical care if:  · You have new or worse symptoms of infection, such as:  ? Increased pain, swelling, warmth, or redness. ? Red streaks leading from the area. ? Pus draining from the area. ? A fever. Watch closely for changes in your health, and be sure to contact your doctor if:  · You have new or worse discharge coming from the ear. · You do not get better as expected. Where can you learn more? Go to https://[employer].Toothpick. org and sign in to your ProviderTrust account. Enter O938 in the Truist box to learn more about \"Earache: Care Instructions. \"     If you do not have an account, please click on the \"Sign Up Now\" link. Current as of: July 29, 2019               Content Version: 12.5  © 4446-5100 Healthwise, Incorporated. Care instructions adapted under license by [facility] ([facility]). If you have questions about a medical condition or this instruction, always ask your healthcare professional. [address] any warranty or liability for your use of this information.

## 2020-09-07 ASSESSMENT — ENCOUNTER SYMPTOMS
SHORTNESS OF BREATH: 0
ABDOMINAL PAIN: 0
SORE THROAT: 0
VOMITING: 0
CHEST TIGHTNESS: 0
NAUSEA: 0
COUGH: 0
WHEEZING: 0

## 2020-09-10 ENCOUNTER — PATIENT MESSAGE (OUTPATIENT)
Dept: FAMILY MEDICINE CLINIC | Age: 65
End: 2020-09-10

## 2020-09-10 NOTE — TELEPHONE ENCOUNTER
From: Trudy Shen  To: Gregg Palencia, APRN - CNP  Sent: 9/10/2020 3:28 PM EDT  Subject: Non-Urgent Medical Question    Alvaro Shearer,  Do you have the flu shot available yet? Also, would you fax an order over to [facility] for a mammogram and bone density tests for me? #[phone].   Thanks,  Marika Deutsch

## 2020-10-28 ENCOUNTER — HOSPITAL ENCOUNTER (OUTPATIENT)
Dept: WOMENS IMAGING | Age: 65
Discharge: HOME | End: 2020-10-30
Payer: COMMERCIAL

## 2020-10-28 PROCEDURE — 77067 SCR MAMMO BI INCL CAD: CPT

## 2020-10-28 PROCEDURE — 77080 DXA BONE DENSITY AXIAL: CPT

## 2021-03-01 ENCOUNTER — NURSE ONLY (OUTPATIENT)
Dept: PRIMARY CARE CLINIC | Age: 66
End: 2021-03-01

## 2021-03-01 DIAGNOSIS — Z20.822 SUSPECTED COVID-19 VIRUS INFECTION: ICD-10-CM

## 2021-03-02 LAB
SARS-COV-2: NOT DETECTED
SOURCE: NORMAL

## 2021-03-03 ENCOUNTER — HOSPITAL ENCOUNTER (OUTPATIENT)
Age: 66
Setting detail: OUTPATIENT SURGERY
Discharge: HOME | End: 2021-03-03
Attending: ORTHOPAEDIC SURGERY | Admitting: ORTHOPAEDIC SURGERY
Payer: COMMERCIAL

## 2021-03-03 ENCOUNTER — ANESTHESIA EVENT (OUTPATIENT)
Dept: OPERATING ROOM | Age: 66
End: 2021-03-03
Payer: COMMERCIAL

## 2021-03-03 ENCOUNTER — ANESTHESIA (OUTPATIENT)
Dept: OPERATING ROOM | Age: 66
End: 2021-03-03
Payer: COMMERCIAL

## 2021-03-03 VITALS
DIASTOLIC BLOOD PRESSURE: 66 MMHG | RESPIRATION RATE: 16 BRPM | HEART RATE: 69 BPM | HEIGHT: 63 IN | OXYGEN SATURATION: 99 % | WEIGHT: 138 LBS | SYSTOLIC BLOOD PRESSURE: 137 MMHG | TEMPERATURE: 98.1 F | BODY MASS INDEX: 24.45 KG/M2

## 2021-03-03 VITALS — DIASTOLIC BLOOD PRESSURE: 60 MMHG | SYSTOLIC BLOOD PRESSURE: 124 MMHG | OXYGEN SATURATION: 98 %

## 2021-03-03 DIAGNOSIS — Z41.9 ELECTIVE SURGERY: Primary | ICD-10-CM

## 2021-03-03 PROCEDURE — 2580000003 HC RX 258: Performed by: ORTHOPAEDIC SURGERY

## 2021-03-03 PROCEDURE — 3700000001 HC ADD 15 MINUTES (ANESTHESIA): Performed by: ORTHOPAEDIC SURGERY

## 2021-03-03 PROCEDURE — 2709999900 HC NON-CHARGEABLE SUPPLY: Performed by: ORTHOPAEDIC SURGERY

## 2021-03-03 PROCEDURE — 2500000003 HC RX 250 WO HCPCS: Performed by: ORTHOPAEDIC SURGERY

## 2021-03-03 PROCEDURE — 3600000004 HC SURGERY LEVEL 4 BASE: Performed by: ORTHOPAEDIC SURGERY

## 2021-03-03 PROCEDURE — 6360000002 HC RX W HCPCS: Performed by: ORTHOPAEDIC SURGERY

## 2021-03-03 PROCEDURE — 3600000014 HC SURGERY LEVEL 4 ADDTL 15MIN: Performed by: ORTHOPAEDIC SURGERY

## 2021-03-03 PROCEDURE — 3700000000 HC ANESTHESIA ATTENDED CARE: Performed by: ORTHOPAEDIC SURGERY

## 2021-03-03 RX ORDER — HYDROCODONE BITARTRATE AND ACETAMINOPHEN 5; 325 MG/1; MG/1
1 TABLET ORAL PRN
Status: DISCONTINUED | OUTPATIENT
Start: 2021-03-03 | End: 2021-03-03 | Stop reason: HOSPADM

## 2021-03-03 RX ORDER — FENTANYL CITRATE 50 UG/ML
50 INJECTION, SOLUTION INTRAMUSCULAR; INTRAVENOUS EVERY 10 MIN PRN
Status: DISCONTINUED | OUTPATIENT
Start: 2021-03-03 | End: 2021-03-03 | Stop reason: HOSPADM

## 2021-03-03 RX ORDER — LIDOCAINE HYDROCHLORIDE AND EPINEPHRINE 10; 10 MG/ML; UG/ML
20 INJECTION, SOLUTION INFILTRATION; PERINEURAL
Status: COMPLETED | OUTPATIENT
Start: 2021-03-03 | End: 2021-03-03

## 2021-03-03 RX ORDER — MAGNESIUM HYDROXIDE 1200 MG/15ML
LIQUID ORAL CONTINUOUS PRN
Status: COMPLETED | OUTPATIENT
Start: 2021-03-03 | End: 2021-03-03

## 2021-03-03 RX ORDER — SODIUM CHLORIDE, SODIUM LACTATE, POTASSIUM CHLORIDE, CALCIUM CHLORIDE 600; 310; 30; 20 MG/100ML; MG/100ML; MG/100ML; MG/100ML
INJECTION, SOLUTION INTRAVENOUS CONTINUOUS
Status: DISCONTINUED | OUTPATIENT
Start: 2021-03-03 | End: 2021-03-03 | Stop reason: HOSPADM

## 2021-03-03 RX ORDER — MEPERIDINE HYDROCHLORIDE 25 MG/ML
12.5 INJECTION INTRAMUSCULAR; INTRAVENOUS; SUBCUTANEOUS EVERY 5 MIN PRN
Status: DISCONTINUED | OUTPATIENT
Start: 2021-03-03 | End: 2021-03-03 | Stop reason: HOSPADM

## 2021-03-03 RX ORDER — DIPHENHYDRAMINE HYDROCHLORIDE 50 MG/ML
12.5 INJECTION INTRAMUSCULAR; INTRAVENOUS
Status: DISCONTINUED | OUTPATIENT
Start: 2021-03-03 | End: 2021-03-03 | Stop reason: HOSPADM

## 2021-03-03 RX ORDER — ONDANSETRON 2 MG/ML
4 INJECTION INTRAMUSCULAR; INTRAVENOUS
Status: DISCONTINUED | OUTPATIENT
Start: 2021-03-03 | End: 2021-03-03 | Stop reason: HOSPADM

## 2021-03-03 RX ORDER — METOCLOPRAMIDE HYDROCHLORIDE 5 MG/ML
10 INJECTION INTRAMUSCULAR; INTRAVENOUS
Status: DISCONTINUED | OUTPATIENT
Start: 2021-03-03 | End: 2021-03-03 | Stop reason: HOSPADM

## 2021-03-03 RX ORDER — OYSTER SHELL CALCIUM WITH VITAMIN D 500; 200 MG/1; [IU]/1
1 TABLET, FILM COATED ORAL DAILY
COMMUNITY

## 2021-03-03 RX ORDER — LIDOCAINE HYDROCHLORIDE 10 MG/ML
1 INJECTION, SOLUTION EPIDURAL; INFILTRATION; INTRACAUDAL; PERINEURAL
Status: COMPLETED | OUTPATIENT
Start: 2021-03-03 | End: 2021-03-03

## 2021-03-03 RX ORDER — CEFAZOLIN SODIUM 2 G/50ML
2000 SOLUTION INTRAVENOUS
Status: COMPLETED | OUTPATIENT
Start: 2021-03-03 | End: 2021-03-03

## 2021-03-03 RX ORDER — HYDROCODONE BITARTRATE AND ACETAMINOPHEN 5; 325 MG/1; MG/1
2 TABLET ORAL PRN
Status: DISCONTINUED | OUTPATIENT
Start: 2021-03-03 | End: 2021-03-03 | Stop reason: HOSPADM

## 2021-03-03 RX ADMIN — SODIUM BICARBONATE 2 ML: 84 INJECTION, SOLUTION INTRAVENOUS at 08:59

## 2021-03-03 RX ADMIN — SODIUM CHLORIDE, POTASSIUM CHLORIDE, SODIUM LACTATE AND CALCIUM CHLORIDE: 600; 310; 30; 20 INJECTION, SOLUTION INTRAVENOUS at 08:58

## 2021-03-03 RX ADMIN — LIDOCAINE HYDROCHLORIDE 0.1 ML: 10 INJECTION, SOLUTION EPIDURAL; INFILTRATION; INTRACAUDAL; PERINEURAL at 08:57

## 2021-03-03 RX ADMIN — CEFAZOLIN SODIUM 2 G: 2 SOLUTION INTRAVENOUS at 09:18

## 2021-03-03 RX ADMIN — LIDOCAINE HYDROCHLORIDE,EPINEPHRINE BITARTRATE 20 ML: 10; .01 INJECTION, SOLUTION INFILTRATION; PERINEURAL at 08:58

## 2021-03-03 RX ADMIN — SODIUM CHLORIDE, POTASSIUM CHLORIDE, SODIUM LACTATE AND CALCIUM CHLORIDE: 600; 310; 30; 20 INJECTION, SOLUTION INTRAVENOUS at 08:56

## 2021-03-03 ASSESSMENT — PULMONARY FUNCTION TESTS
PIF_VALUE: 0

## 2021-03-03 NOTE — ANESTHESIA PRE PROCEDURE
Department of Anesthesiology  Preprocedure Note       Name:  Jorge Dates   Age:  60 y.o.  :  1955                                          MRN:  [id number]         Date:  3/3/2021      Surgeon: Kimmie Denton):  Romeo Pena DO    Procedure: Procedure(s):  RIGHT WRIST DEQUERVAINS  RELEASE, WIDE AWAKE BLOCK, SUPINE, LOCAL + MAC  TO BE LATEX FREE    Medications prior to admission:   Prior to Admission medications    Medication Sig Start Date End Date Taking? Authorizing Provider   estradiol (CLIMARA) 0.1 MG/24HR APPLY 1 PATCH TWICE WEEKLY 20   Salas Quinones DO   Brimonidine Tartrate (MIRVASO) 0.33 % GEL Apply pea sized amount daily to face 3/16/20   Wilbur Clemons MD   lidocaine viscous hcl (XYLOCAINE) 2 % SOLN solution Take 15 mLs by mouth as needed for Irritation 20   Yessy Neat, APRN - CNP   albuterol sulfate  (90 Base) MCG/ACT inhaler Inhale 2 puffs into the lungs every 6 hours as needed for Wheezing 2/3/20   Yessy Neat, APRN - CNP   mometasone (NASONEX) 50 MCG/ACT nasal spray 2 sprays by Nasal route daily    Historical Provider, MD   calcium carbonate 600 MG TABS tablet Take 1 tablet by mouth daily.     Historical Provider, MD       Current medications:    Current Facility-Administered Medications   Medication Dose Route Frequency Provider Last Rate Last Admin    ceFAZolin (ANCEF) 2000 mg in dextrose 3 % 50 mL IVPB (duplex)  2,000 mg Intravenous On Call to Mike Blanchard DO        lactated ringers infusion   Intravenous Continuous Praveen Virk DO        lidocaine PF 1 % injection 1 mL  1 mL Intradermal Once PRN Praveen Virk DO        lidocaine-EPINEPHrine 1 percent-1:[id number] injection 20 mL  20 mL Intradermal Once PRN Praveen Virk DO        sodium bicarbonate 8.4 % injection 50 mEq  50 mEq Intravenous Once PRN Praveen Virk DO        meperidine (DEMEROL) injection 12.5 mg  12.5 mg Intravenous Q5 Min PRN Mike Kevin MD  fentaNYL (SUBLIMAZE) injection 50 mcg  50 mcg Intravenous Q10 Min PRN Alona Mosqueda MD        HYDROmorphone (DILAUDID) injection 0.5 mg  0.5 mg Intravenous Q10 Min PRN Alona Mosqueda MD        HYDROcodone-acetaminophen [facility]) 5-325 MG per tablet 1 tablet  1 tablet Oral PRN Alona Mosqueda MD        Or    HYDROcodone-acetaminophen [facility]) 5-325 MG per tablet 2 tablet  2 tablet Oral PRN Alona Mosqueda MD        ondansetron [facility]) injection 4 mg  4 mg Intravenous Once PRN Alona Mosqueda MD        metoclopramide [facility]) injection 10 mg  10 mg Intravenous Once PRN Alona Mosqueda MD        diphenhydrAMINE (BENADRYL) injection 12.5 mg  12.5 mg Intravenous Once PRN Alona Mosqueda MD           Allergies:     Allergies   Allergen Reactions    Sulfamethoxazole-Trimethoprim Itching and Rash       Problem List:    Patient Active Problem List   Diagnosis Code    Osteopenia M85.80    Vitamin D deficiency E55.9    Asthma J45.909    Gastroesophageal reflux disease K21.9    Pain in lower limb M79.606    Synovitis and tenosynovitis M65.9    Vaginal enterocele N81.5    Acute appendicitis with generalized peritonitis K35.20    Melena K92.1    Esophagitis K20.90       Past Medical History:        Diagnosis Date    Asthma     GERD (gastroesophageal reflux disease)     Osteopenia        Past Surgical History:        Procedure Laterality Date    APPENDECTOMY      COLONOSCOPY      HYSTERECTOMY  2004    total    KNEE ARTHROSCOPY  age 20    LAPAROSCOPIC APPENDECTOMY N/A 2/4/2020    APPENDECTOMY LAPAROSCOPIC performed by Laney Villa MD at [address] [place] [address] 10/3/2018    LAPAROSCOPY EXPLORATORY performed by Laney Villa MD at [address] [place] [address] POLYPECTOMY  2010    colon, stomach    [place] RECTUM SURGERY PROCEDURE UNLISTED N/A 10/3/2018    TRANSVAGINAL RECTOCELE REPAIR and Diagnostic Laproscopy performed by Laney Villa MD at [facility]  TONSILLECTOMY      child    UPPER GASTROINTESTINAL ENDOSCOPY  11/5/15     DR. LOVING     UPPER GASTROINTESTINAL ENDOSCOPY N/A 2/27/2020    EGD ESOPHAGOGASTRODUODENOSCOPY performed by Radha Soto MD at [address] History:    Social History     Tobacco Use    Smoking status: Never Smoker    Smokeless tobacco: Never Used   Substance Use Topics    Alcohol use: Yes     Comment: socially                                Counseling given: Not Answered      Vital Signs (Current): There were no vitals filed for this visit. BP Readings from Last 3 Encounters:   08/25/20 110/60   03/16/20 120/60   02/27/20 113/65       NPO Status:                                                                                 BMI:   Wt Readings from Last 3 Encounters:   08/25/20 144 lb (65.3 kg)   03/16/20 140 lb (63.5 kg)   03/06/20 141 lb 9.6 oz (64.2 kg)     There is no height or weight on file to calculate BMI.    CBC:   Lab Results   Component Value Date    WBC 8.1 02/17/2020    RBC 4.22 02/17/2020    HGB 13.3 02/17/2020    HCT 39.4 02/17/2020    MCV 93.5 02/17/2020    RDW 13.3 02/17/2020     02/17/2020       CMP:   Lab Results   Component Value Date     02/17/2020    K 3.8 02/17/2020    CL 96 02/17/2020    CO2 27 02/17/2020    BUN 14 02/17/2020    CREATININE 0.64 02/17/2020    GFRAA >60.0 02/17/2020    LABGLOM >60.0 02/17/2020    GLUCOSE 94 02/17/2020    PROT 7.7 02/03/2020    CALCIUM 9.1 02/17/2020    BILITOT 0.4 02/03/2020    ALKPHOS 64 02/03/2020    AST 18 02/03/2020    ALT 11 02/03/2020       POC Tests: No results for input(s): POCGLU, POCNA, POCK, POCCL, POCBUN, POCHEMO, POCHCT in the last 72 hours.     Coags: No results found for: PROTIME, INR, APTT    HCG (If Applicable): No results found for: PREGTESTUR, PREGSERUM, HCG, HCGQUANT     ABGs: No results found for: PHART, PO2ART, STP2XXI, ZJZ2SOH, BEART, P4KZSPLU     Type & Screen (If Applicable): No results found for: LABABO, LABRH    Drug/Infectious Status (If Applicable):  No results found for: HIV, HEPCAB    COVID-19 Screening (If Applicable):   Lab Results   Component Value Date    COVID19 Not Detected 03/01/2021         Anesthesia Evaluation  Patient summary reviewed and Nursing notes reviewed no history of anesthetic complications:   Airway: Mallampati: II  TM distance: >3 FB   Neck ROM: full  Mouth opening: > = 3 FB Dental:          Pulmonary:normal exam    (+) asthma:                            Cardiovascular:Negative CV ROS          ECG reviewed                        Neuro/Psych:   Negative Neuro/Psych ROS              GI/Hepatic/Renal: Neg GI/Hepatic/Renal ROS            Endo/Other: Negative Endo/Other ROS             Pt had PAT visit. Abdominal:           Vascular: negative vascular ROS. Anesthesia Plan      MAC     ASA 2             Anesthetic plan and risks discussed with patient.         Attending anesthesiologist reviewed and agrees with Pre Eval content              La Daly MD   3/3/2021

## 2021-03-03 NOTE — OP NOTE
Operative Note      Patient: Lidia Meyer  YOB: 1955  MRN: [id number]    Date of Procedure: 3/3/2021      Preoperative diagnosis: Right de Quervain's tenosynovitis     Postoperative diagnosis: Right de Quervain's tenosynovitis     Procedure planned: Release of first dorsal compartment tendon sheath, right wrist     Procedure performed: Release of first dorsal compartment tendon sheath, right wrist     Surgeon: Abena Ferreira D.O. Assistant: ROMEO Azevedo  The physician assistant was present through the entire case. Given the nature of the disease process and the procedure to be performed a skilled surgical assistant was necessary during the case. The assistant was necessary in order to hold retractors and directly assist in the operation. A certified scrub tech was at the back table managing instruments and supplies for the surgical case. Anesthesia: Local field block monitored by the anesthesia team     Estimated blood loss: Less than 10 mL     Drains: None     Tourniquet: None     Specimens: None     Implants: None     Indications: The patient presented to the office with right radial wrist pain. She had clinical examination consistent with de Quervain's disease. The patient had failure of nonoperative treatment strategies. After full discussion regarding risks benefits and alternatives the patient elected to proceed forth with surgery. Informed consent was signed and placed in the chart. Complications: None noted at the time of surgery     Description of operation: The patient was taken to the operative suite and placed in the supine position on the operating table with the right arm extended to an arm board. A timeout was performed and the right wrist was confirmed to be the operative site. The patient was carefully positioned on the table in such a fashion as to pad all bony prominences and peripheral nerves.  The patient was then prepped and draped in the normal sterile fashion. In the preoperative holding area a local field block was placed with a solution of lidocaine with epinephrine. The block was working very well. A short oblique incision was marked out about the radial styloid over the course of the first dorsal compartment tendons. The 15 blade was used to incise skin. Dissection was carried down to the subcutaneous plane. Care was taken to identify and protect the dorsal sensory branches of the radial nerve as this was done. We then used the 15 blade to open the first dorsal compartment tendon sheath just distal to the retinacular layer, staying on the dorsal portion of the first dorsal compartment tendon sheath. Release was carried from the base of the first metacarpal up to a point approximately 5 cm proximal to the tip of the radial styloid. Care was taken to identify sub-sheaths within the first dorsal compartment  slips of the extensor pollicis brevis and abductor pollicis longus and to discretely release them. Traction tenolysis was then performed independently to the various slips of the extensor pollicis brevis tendon and abductor pollicis longus. Copious irrigation was then performed. Interrupted nylon stitches were used to close the skin. A bulky soft dressing was placed. The patient was then allowed to head to recovery in stable condition. Overall the patient tolerated the procedure well.     Disposition: to PACU          Electronically signed by Юлия Carrillo DO on 3/3/2021 at 9:41 AM

## 2021-07-20 ENCOUNTER — OFFICE VISIT (OUTPATIENT)
Dept: OBGYN CLINIC | Age: 66
End: 2021-07-20
Payer: COMMERCIAL

## 2021-07-20 VITALS — SYSTOLIC BLOOD PRESSURE: 132 MMHG | BODY MASS INDEX: 25.86 KG/M2 | DIASTOLIC BLOOD PRESSURE: 68 MMHG | WEIGHT: 146 LBS

## 2021-07-20 DIAGNOSIS — N36.41 URETHRAL HYPERMOBILITY: ICD-10-CM

## 2021-07-20 DIAGNOSIS — N39.3 SUI (STRESS URINARY INCONTINENCE, FEMALE): ICD-10-CM

## 2021-07-20 DIAGNOSIS — Z12.31 ENCOUNTER FOR SCREENING MAMMOGRAM FOR BREAST CANCER: Primary | ICD-10-CM

## 2021-07-20 PROCEDURE — 99397 PER PM REEVAL EST PAT 65+ YR: CPT | Performed by: OBSTETRICS & GYNECOLOGY

## 2021-07-20 RX ORDER — FLUTICASONE PROPIONATE 50 MCG
1 SPRAY, SUSPENSION (ML) NASAL DAILY
COMMUNITY
End: 2021-12-03 | Stop reason: SDUPTHER

## 2021-07-20 ASSESSMENT — ENCOUNTER SYMPTOMS
SHORTNESS OF BREATH: 0
ABDOMINAL DISTENTION: 0
VOICE CHANGE: 0
NAUSEA: 0
COUGH: 0
ABDOMINAL PAIN: 0
BACK PAIN: 0
TROUBLE SWALLOWING: 0
VOMITING: 0
COLOR CHANGE: 0
CONSTIPATION: 0
WHEEZING: 0
CHEST TIGHTNESS: 0
BLOOD IN STOOL: 0
SORE THROAT: 0

## 2021-07-20 NOTE — PROGRESS NOTES
CHIEF COMPLAINT: Is having stress urinary incontinence. Had a rectocele repair. No urgency and she is using estradiol weekly patches with good results  Chief Complaint   Patient presents with   Freddie Recinos Annual Exam     LAVH 2004. Had spotting in April. HISTORY OF PRESENT ILLNESS:  73 y.o. female presents for her annual exam. She had no concernsor complaints today. Her menses is absent. She denies breakthrough bleeding, pelvic pain, or abnormal discharge. Bowel and bladder function is normal. Her health overallhas been good. Past Medical History:   Diagnosis Date    Asthma     GERD (gastroesophageal reflux disease)     Osteopenia      Past Surgical History:   Procedure Laterality Date    APPENDECTOMY      COLONOSCOPY      HYSTERECTOMY  2004    total    KNEE ARTHROSCOPY  age 22   Freddie Recinos LAPAROSCOPIC APPENDECTOMY N/A 2/4/2020    APPENDECTOMY LAPAROSCOPIC performed by Elbert Liz MD at [address] 10/3/2018    LAPAROSCOPY EXPLORATORY performed by Elbert Liz MD at [facility] POLYPECTOMY  2010    colon, stomach    PA RECTUM SURGERY PROCEDURE UNLISTED N/A 10/3/2018    TRANSVAGINAL RECTOCELE REPAIR and Diagnostic Laproscopy performed by Elbert Liz MD at [address] [facility]      child    UPPER GASTROINTESTINAL ENDOSCOPY  11/5/15     DR. LOVING     UPPER GASTROINTESTINAL ENDOSCOPY N/A 2/27/2020    EGD ESOPHAGOGASTRODUODENOSCOPY performed by Florentino Montez MD at [address] Right 3/3/2021    RIGHT WRIST [address] performed by Stephany Maher DO at [address] History   Problem Relation Age of Onset    Arthritis Mother         RA   Moshee Jorje Asthma Mother     Kidney Disease Mother     Arthritis Father         RA    Cancer Father         prostate    Heart Disease Father     High Blood Pressure Father     High Cholesterol Father     Stroke Father     Other Father         melenoma    Breast Cancer Neg Hx      Social History Socioeconomic History    Marital status:      Spouse name: Not on file    Number of children: Not on file    Years of education: Not on file    Highest education level: Not on file   Occupational History    Not on file   Tobacco Use    Smoking status: Never Smoker    Smokeless tobacco: Never Used   Vaping Use    Vaping Use: Never used   Substance and Sexual Activity    Alcohol use: Yes     Comment: socially    Drug use: No    Sexual activity: Yes     Partners: Male   Other Topics Concern    Not on file   Social History Narrative    Not on file     Social Determinants of Health     Financial Resource Strain:     Difficulty of Paying Living Expenses:    Food Insecurity:     Worried About Running Out of Food in the Last Year:     [address] in the Last Year:    Transportation Needs:     Lack of Transportation (Medical):  Lack of Transportation (Non-Medical):    Physical Activity:     Days of Exercise per Week:     Minutes of Exercise per Session:    Stress:     Feeling of Stress :    Social Connections:     Frequency of Communication with Friends and Family:     Frequency of Social Gatherings with Friends and Family:     Attends [employer]oration[employer] Services:     Active Member of Clubs or Organizations:     Attends Club or Organization Meetings:     Marital Status:    Intimate Partner Violence:     Fear of Current or Ex-Partner:     Emotionally Abused:     Physically Abused:     Sexually Abused:       Allergies:  Sulfamethoxazole-trimethoprim  Current Outpatient Medications on File Prior to Visit   Medication Sig Dispense Refill    fluticasone (FLONASE) 50 MCG/ACT nasal spray 1 spray by Each Nostril route daily      calcium-vitamin D (OSCAL-500) 500-200 MG-UNIT per tablet Take 1 tablet by mouth daily      albuterol sulfate  (90 Base) MCG/ACT inhaler Inhale 2 puffs into the lungs every 6 hours as needed for Wheezing 1 Inhaler 3     No current facility-administered medications on file prior to visit. OB History    No obstetric history on file. Patient's medications, allergies, past medical, surgical,social and family histories were reviewed and updated as appropriate. Review of Systems   Constitutional: Negative for activity change, appetite change, fatigue and unexpected weight change. HENT: Negative for dental problem, ear pain, hearing loss, nosebleeds, sore throat, trouble swallowing and voice change. Eyes: Negative for visual disturbance. Respiratory: Negative for cough, chest tightness, shortness of breath and wheezing. Cardiovascular: Negative for chest pain and palpitations. Gastrointestinal: Negative for abdominal distention, abdominal pain, blood in stool, constipation, nausea and vomiting. Endocrine: Negative for cold intolerance, heat intolerance, polydipsia, polyphagia and polyuria. Genitourinary: Negative for difficulty urinating, dyspareunia, dysuria, flank pain, frequency, genital sores, hematuria, menstrual problem, pelvic pain, urgency, vaginal bleeding, vaginal discharge and vaginal pain. Musculoskeletal: Negative for arthralgias, back pain, joint swelling and myalgias. Skin: Negative for color change and rash. Allergic/Immunologic: Negative for environmental allergies, food allergies and immunocompromised state. Neurological: Negative for dizziness, seizures, syncope, speech difficulty, weakness, numbness and headaches. Hematological: Negative for adenopathy. Does not bruise/bleed easily. Psychiatric/Behavioral: Negative for agitation, behavioral problems, confusion, decreased concentration, dysphoric mood and suicidal ideas. The patient is not nervous/anxious and is not hyperactive. All other systems reviewed and are negative. PHYSICAL EXAM  /68   Wt 146 lb (66.2 kg)   BMI 25.86 kg/m²     Physical Exam  Vitals and nursing note reviewed. Exam conducted with a chaperone present.    Constitutional: Appearance: She is well-developed. HENT:      Head: Normocephalic and atraumatic. Eyes:      Pupils: Pupils are equal, round, and reactive to light. Neck:      Thyroid: No thyromegaly. Trachea: No tracheal deviation. Cardiovascular:      Rate and Rhythm: Normal rate and regular rhythm. Heart sounds: Normal heart sounds. Pulmonary:      Effort: Pulmonary effort is normal.      Breath sounds: Normal breath sounds. Chest:      Chest wall: No tenderness. Abdominal:      General: Bowel sounds are normal. There is no distension. Palpations: Abdomen is soft. There is no mass. Tenderness: There is no abdominal tenderness. There is no guarding or rebound. Hernia: There is no hernia in the left inguinal area. Genitourinary:     Labia:         Right: No rash, tenderness, lesion or injury. Left: No rash, tenderness, lesion or injury. Vagina: Normal. No foreign body. No vaginal discharge, erythema, tenderness or bleeding. Adnexa:         Right: No mass, tenderness or fullness. Left: No mass, tenderness or fullness. Rectum: Normal. No mass, tenderness, anal fissure, external hemorrhoid or internal hemorrhoid. Normal anal tone. Musculoskeletal:         General: Normal range of motion. Cervical back: Normal range of motion. Lymphadenopathy:      Cervical: No cervical adenopathy. Neurological:      Mental Status: She is alert and oriented to person, place, and time. ASSESSMENT : Routine Annual Exam      Diagnosis Orders   1. Encounter for screening mammogram for breast cancer  [facility] DIGITAL SCREEN W OR WO CAD BILATERAL   Hypermobile urethra  Stress urinary incontinence    PLAN: Discussed TVT O. Continue using the estradiol.   Patient will do a mammogram.  Patient can call and let me know if she wants to do the TVT O  Pap smear obtained  Orders Placed This Encounter   Procedures    ANDRIY DIGITAL SCREEN W OR WO CAD BILATERAL Standing Status:   Future     Standing Expiration Date:   9/16/2022     Order Specific Question:   Reason for exam:     Answer:   Yearly mammogram     Orders Placed This Encounter   Medications    estradiol (CLIMARA) 0.1 MG/24HR     Sig: Apply one patch weekly     Dispense:  24 patch     Refill:  1       Repeat Annual every 1 year. New ASCCP guidelines regarding pap and Hi Risk HPV co-testing reviewed. Cervical Cytology Evaluation begins at 24years old. If Negative Cytology, Follow-upscreening per current  ASCCP guidelines. Mammograms every 1 year. If 37 yo and last mammogram was negative. Calcium and Vitamin D dosing reviewed. Colonoscopy screening guidelines reviewed as well as onset forbone density testing. Birth control and barrier methods and recommendations discussed. STD counseling and prevention reviewed. Gardisil counseling completed for all patients 7-33 yo. Routine health maintenance per patients PCP.        Electronically signed by Serenity Martin DO on 7/20/21

## 2021-11-22 ENCOUNTER — HOSPITAL ENCOUNTER (OUTPATIENT)
Dept: WOMENS IMAGING | Age: 66
Discharge: HOME | End: 2021-11-24
Payer: MEDICARE

## 2021-11-22 DIAGNOSIS — Z12.31 ENCOUNTER FOR SCREENING MAMMOGRAM FOR BREAST CANCER: ICD-10-CM

## 2021-11-22 PROCEDURE — 77063 BREAST TOMOSYNTHESIS BI: CPT

## 2021-11-29 ENCOUNTER — TELEPHONE (OUTPATIENT)
Dept: OBGYN CLINIC | Age: 66
End: 2021-11-29

## 2021-12-03 ENCOUNTER — OFFICE VISIT (OUTPATIENT)
Dept: FAMILY MEDICINE CLINIC | Age: 66
End: 2021-12-03
Payer: MEDICARE

## 2021-12-03 ENCOUNTER — HOSPITAL ENCOUNTER (OUTPATIENT)
Dept: LAB | Age: 66
Discharge: HOME | End: 2021-12-03
Payer: MEDICARE

## 2021-12-03 VITALS
OXYGEN SATURATION: 99 % | DIASTOLIC BLOOD PRESSURE: 66 MMHG | SYSTOLIC BLOOD PRESSURE: 120 MMHG | HEIGHT: 63 IN | WEIGHT: 140 LBS | TEMPERATURE: 96.8 F | RESPIRATION RATE: 16 BRPM | HEART RATE: 60 BPM | BODY MASS INDEX: 24.8 KG/M2

## 2021-12-03 DIAGNOSIS — Z13.1 SCREENING FOR DIABETES MELLITUS: ICD-10-CM

## 2021-12-03 DIAGNOSIS — Z13.220 SCREENING CHOLESTEROL LEVEL: ICD-10-CM

## 2021-12-03 DIAGNOSIS — J45.20 MILD INTERMITTENT ASTHMA WITHOUT COMPLICATION: ICD-10-CM

## 2021-12-03 DIAGNOSIS — E55.9 VITAMIN D DEFICIENCY: ICD-10-CM

## 2021-12-03 DIAGNOSIS — Z00.00 ROUTINE GENERAL MEDICAL EXAMINATION AT A HEALTH CARE FACILITY: Primary | ICD-10-CM

## 2021-12-03 LAB
ALBUMIN SERPL-MCNC: 4.3 G/DL (ref 3.5–4.6)
ALP BLD-CCNC: 54 U/L (ref 40–130)
ALT SERPL-CCNC: 13 U/L (ref 0–33)
ANION GAP SERPL CALCULATED.3IONS-SCNC: 14 MEQ/L (ref 9–15)
AST SERPL-CCNC: 19 U/L (ref 0–35)
BASOPHILS ABSOLUTE: 0 K/UL (ref 0–0.2)
BASOPHILS RELATIVE PERCENT: 0.6 %
BILIRUB SERPL-MCNC: 0.5 MG/DL (ref 0.2–0.7)
BUN BLDV-MCNC: 10 MG/DL (ref 8–23)
CALCIUM SERPL-MCNC: 9.3 MG/DL (ref 8.5–9.9)
CHLORIDE BLD-SCNC: 101 MEQ/L (ref 95–107)
CO2: 24 MEQ/L (ref 20–31)
CREAT SERPL-MCNC: 0.63 MG/DL (ref 0.5–0.9)
EOSINOPHILS ABSOLUTE: 0.1 K/UL (ref 0–0.7)
EOSINOPHILS RELATIVE PERCENT: 2.8 %
GFR AFRICAN AMERICAN: >60
GFR NON-AFRICAN AMERICAN: >60
GLOBULIN: 3 G/DL (ref 2.3–3.5)
GLUCOSE BLD-MCNC: 93 MG/DL (ref 70–99)
HCT VFR BLD CALC: 40.4 % (ref 37–47)
HEMOGLOBIN: 13.2 G/DL (ref 12–16)
LYMPHOCYTES ABSOLUTE: 1.5 K/UL (ref 1–4.8)
LYMPHOCYTES RELATIVE PERCENT: 31.9 %
MCH RBC QN AUTO: 31.2 PG (ref 27–31.3)
MCHC RBC AUTO-ENTMCNC: 32.8 % (ref 33–37)
MCV RBC AUTO: 95.2 FL (ref 82–100)
MONOCYTES ABSOLUTE: 0.4 K/UL (ref 0.2–0.8)
MONOCYTES RELATIVE PERCENT: 8.8 %
NEUTROPHILS ABSOLUTE: 2.6 K/UL (ref 1.4–6.5)
NEUTROPHILS RELATIVE PERCENT: 55.9 %
PDW BLD-RTO: 13.8 % (ref 11.5–14.5)
PLATELET # BLD: 244 K/UL (ref 130–400)
POTASSIUM SERPL-SCNC: 4.3 MEQ/L (ref 3.4–4.9)
RBC # BLD: 4.24 M/UL (ref 4.2–5.4)
SODIUM BLD-SCNC: 139 MEQ/L (ref 135–144)
TOTAL PROTEIN: 7.3 G/DL (ref 6.3–8)
WBC # BLD: 4.7 K/UL (ref 4.8–10.8)

## 2021-12-03 PROCEDURE — 80061 LIPID PANEL: CPT

## 2021-12-03 PROCEDURE — 85025 COMPLETE CBC W/AUTO DIFF WBC: CPT

## 2021-12-03 PROCEDURE — 90732 PPSV23 VACC 2 YRS+ SUBQ/IM: CPT | Performed by: NURSE PRACTITIONER

## 2021-12-03 PROCEDURE — G0438 PPPS, INITIAL VISIT: HCPCS | Performed by: NURSE PRACTITIONER

## 2021-12-03 PROCEDURE — 82306 VITAMIN D 25 HYDROXY: CPT

## 2021-12-03 PROCEDURE — G0009 ADMIN PNEUMOCOCCAL VACCINE: HCPCS | Performed by: NURSE PRACTITIONER

## 2021-12-03 PROCEDURE — 80053 COMPREHEN METABOLIC PANEL: CPT

## 2021-12-03 PROCEDURE — 36415 COLL VENOUS BLD VENIPUNCTURE: CPT

## 2021-12-03 RX ORDER — FLUTICASONE PROPIONATE 50 MCG
1 SPRAY, SUSPENSION (ML) NASAL DAILY
Qty: 1 EACH | Refills: 3 | Status: SHIPPED | OUTPATIENT
Start: 2021-12-03 | End: 2022-01-25

## 2021-12-03 RX ORDER — HYDROCODONE BITARTRATE AND ACETAMINOPHEN 5; 325 MG/1; MG/1
TABLET ORAL
COMMUNITY
Start: 2021-03-01 | End: 2021-12-17 | Stop reason: ALTCHOICE

## 2021-12-03 RX ORDER — ALBUTEROL SULFATE 90 UG/1
2 AEROSOL, METERED RESPIRATORY (INHALATION) EVERY 6 HOURS PRN
Qty: 1 EACH | Refills: 3 | Status: SHIPPED | OUTPATIENT
Start: 2021-12-03

## 2021-12-03 SDOH — ECONOMIC STABILITY: FOOD INSECURITY: WITHIN THE PAST 12 MONTHS, THE FOOD YOU BOUGHT JUST DIDN'T LAST AND YOU DIDN'T HAVE MONEY TO GET MORE.: NEVER TRUE

## 2021-12-03 SDOH — ECONOMIC STABILITY: FOOD INSECURITY: WITHIN THE PAST 12 MONTHS, YOU WORRIED THAT YOUR FOOD WOULD RUN OUT BEFORE YOU GOT MONEY TO BUY MORE.: NEVER TRUE

## 2021-12-03 ASSESSMENT — PATIENT HEALTH QUESTIONNAIRE - PHQ9
SUM OF ALL RESPONSES TO PHQ9 QUESTIONS 1 & 2: 0
2. FEELING DOWN, DEPRESSED OR HOPELESS: 0
SUM OF ALL RESPONSES TO PHQ QUESTIONS 1-9: 0
1. LITTLE INTEREST OR PLEASURE IN DOING THINGS: 0
SUM OF ALL RESPONSES TO PHQ9 QUESTIONS 1 & 2: 0
SUM OF ALL RESPONSES TO PHQ QUESTIONS 1-9: 0
1. LITTLE INTEREST OR PLEASURE IN DOING THINGS: 0
2. FEELING DOWN, DEPRESSED OR HOPELESS: 0

## 2021-12-03 ASSESSMENT — LIFESTYLE VARIABLES
AUDIT-C TOTAL SCORE: 4
HOW OFTEN DO YOU HAVE A DRINK CONTAINING ALCOHOL: 4
HOW OFTEN DO YOU HAVE SIX OR MORE DRINKS ON ONE OCCASION: 0
HOW MANY STANDARD DRINKS CONTAINING ALCOHOL DO YOU HAVE ON A TYPICAL DAY: 0

## 2021-12-03 ASSESSMENT — SOCIAL DETERMINANTS OF HEALTH (SDOH): HOW HARD IS IT FOR YOU TO PAY FOR THE VERY BASICS LIKE FOOD, HOUSING, MEDICAL CARE, AND HEATING?: NOT HARD AT ALL

## 2021-12-03 NOTE — PROGRESS NOTES
Medicare Annual Wellness Visit  Name: Amaris Dorsey Date: 12/3/2021   MRN: [id number] Sex: Female   Age: 73 y.o. Ethnicity: Non- / Non    : 1955 Race: White (non-)      Lorena Prabhakar is here for Medicare AWV (Pt. is here for her annual routine health physical. Pt. states she is going to have surgery at the end of the month with Dr. Gale Bella. )    Screenings for behavioral, psychosocial and functional/safety risks, and cognitive dysfunction are all negative except as indicated below. These results, as well as other patient data from the [address] form, are documented in Flowsheets linked to this Encounter. Allergies   Allergen Reactions    Sulfamethoxazole-Trimethoprim Itching and Rash         Prior to Visit Medications    Medication Sig Taking? Authorizing Provider   HYDROcodone-acetaminophen (NORCO) 5-325 MG per tablet Take by mouth.  Yes Historical Provider, MD   albuterol sulfate  (90 Base) MCG/ACT inhaler Inhale 2 puffs into the lungs every 6 hours as needed for Wheezing Yes PAUL Faustin CNP   fluticasone (FLONASE) 50 MCG/ACT nasal spray 1 spray by Each Nostril route daily Yes PAUL Faustin CNP   estradiol (CLIMARA) 0.1 MG/24HR Apply one patch weekly Yes Emili Lugo DO   calcium-vitamin D ([place]) 500-200 MG-UNIT per tablet Take 1 tablet by mouth daily Yes Historical Provider, MD         Past Medical History:   Diagnosis Date    Asthma     GERD (gastroesophageal reflux disease)     Osteopenia        Past Surgical History:   Procedure Laterality Date    APPENDECTOMY      COLONOSCOPY      HYSTERECTOMY      total    KNEE ARTHROSCOPY  age 22   Lonnie Petties LAPAROSCOPIC APPENDECTOMY N/A 2020    APPENDECTOMY LAPAROSCOPIC performed by Paras Vaughan MD at [address] N/A 10/3/2018    LAPAROSCOPY EXPLORATORY performed by Paras Vaughan MD at [address] POLYPECTOMY      colon, stomach    [place] RECTUM SURGERY PROCEDURE UNLISTED N/A 10/3/2018    TRANSVAGINAL RECTOCELE REPAIR and Diagnostic Laproscopy performed by Digna Mathis MD at [address] [facility]      child    UPPER GASTROINTESTINAL ENDOSCOPY  11/5/15      [facility]     UPPER GASTROINTESTINAL ENDOSCOPY N/A 2/27/2020    EGD ESOPHAGOGASTRODUODENOSCOPY performed by Mabel Forrest MD at [address] Right 3/3/2021    RIGHT WRIST [address] performed by Pavan Treviño DO at [facility] OR         Family History   Problem Relation Age of Onset    Arthritis Mother         RA   Best Asthma Mother     Kidney Disease Mother     Arthritis Father         RA    Cancer Father         prostate    Heart Disease Father     High Blood Pressure Father     High Cholesterol Father     Stroke Father     Other Father         melenoma    Breast Cancer Neg Hx        CareTeam (Including outside providers/suppliers regularly involved in providing care):   Patient Care Team:  Stephen Magaañ MD as PCP - General (Family Medicine)  Stephen Magaña MD as PCP - [facility] Empaneled Provider  Mabel Forrest MD (Gastroenterology)    Wt Readings from Last 3 Encounters:   12/03/21 140 lb (63.5 kg)   07/20/21 146 lb (66.2 kg)   03/03/21 138 lb (62.6 kg)     Vitals:    12/03/21 0909   BP: 120/66   Site: Right Upper Arm   Position: Sitting   Cuff Size: Medium Adult   Pulse: 60   Resp: 16   Temp: 96.8 °F (36 °C)   TempSrc: Infrared   SpO2: 99%   Weight: 140 lb (63.5 kg)   Height: 5' 3\" (1.6 m)     Body mass index is 24.8 kg/m². Based upon direct observation of the patient, evaluation of cognition reveals recent and remote memory intact.     General Appearance: alert and oriented to person, place and time, well-developed and well-nourished, in no acute distress  Skin: warm and dry, no rash or erythema  Pulmonary/Chest: clear to auscultation bilaterally- no wheezes, rales or rhonchi, normal air movement, no respiratory distress  Cardiovascular: normal rate, normal S1 and S2, no gallops, intact distal pulses and no carotid bruits  Abdomen: soft, non-tender, non-distended and bowel sounds normal  Musculoskeletal: no joint instability  Neurologic: gait and coordination normal and speech normal    Patient's complete Health Risk Assessment and screening values have been reviewed and are found in Flowsheets. The following problems were reviewed today and where indicated follow up appointments were made and/or referrals ordered. Positive Risk Factor Screenings with Interventions:            General Health and ACP:  General  In general, how would you say your health is?: Very Good  In the past 7 days, have you experienced any of the following?  New or Increased Pain, New or Increased Fatigue, Loneliness, Social Isolation, Stress or Anger?: None of These  Do you get the social and emotional support that you need?: Yes  Do you have a Living Will?: Yes  Advance Directives     Power of  Living Will ACP-Advance Directive ACP-Power of     Not on File Not on File Not on File Not on File      General Health Risk Interventions:  · No Living Will: ACP documents already completed- patient asked to provide copy to the office        Personalized Preventive Plan   Current Health Maintenance Status  Immunization History   Administered Date(s) Administered    COVID-19, Spencer Peter, PF, 30mcg/0.3mL 01/26/2021, 02/17/2021, 10/04/2021    DT (pediatric) 08/01/2013    Influenza A ([id number]) Vaccine PF IM 10/28/2009    Influenza Vaccine, unspecified formulation 11/01/2016, 10/23/2017, 10/15/2018    Influenza Virus Vaccine 10/13/2017, 10/23/2017, 10/15/2018, 11/01/2019    Influenza, High-dose, Justin Mckeon, 65 yrs +, IM (Fluzone) 10/04/2021    Influenza, Quadv, IM, PF (6 mo and older Fluzone, Flulaval, Fluarix, and 3 yrs and older Afluria) 11/01/2019, 10/03/2020, 10/05/2020    Pneumococcal Conjugate 13-valent ([id number]) 05/27/2016    Tdap

## 2021-12-03 NOTE — PATIENT INSTRUCTIONS
Personalized Preventive Plan for Lili Close - 12/3/2021  Medicare offers a range of preventive health benefits. Some of the tests and screenings are paid in full while other may be subject to a deductible, co-insurance, and/or copay. Some of these benefits include a comprehensive review of your medical history including lifestyle, illnesses that may run in your family, and various assessments and screenings as appropriate. After reviewing your medical record and screening and assessments performed today your provider may have ordered immunizations, labs, imaging, and/or referrals for you. A list of these orders (if applicable) as well as your Preventive Care list are included within your After Visit Summary for your review. Other Preventive Recommendations:    · A preventive eye exam performed by an eye specialist is recommended every 1-2 years to screen for glaucoma; cataracts, macular degeneration, and other eye disorders. · A preventive dental visit is recommended every 6 months. · Try to get at least 150 minutes of exercise per week or 10,000 steps per day on a pedometer . · Order or download the FREE \"Exercise & Physical Activity: Your Everyday Guide\" from [employer]. Call 0-780.592.7991 or search [employer] online. · You need 6849-0249 mg of calcium and 2293-6428 IU of vitamin D per day. It is possible to meet your calcium requirement with diet alone, but a vitamin D supplement is usually necessary to meet this goal.  · When exposed to the sun, use a sunscreen that protects against both UVA and UVB radiation with an SPF of 30 or greater. Reapply every 2 to 3 hours or after sweating, drying off with a towel, or swimming. · Always wear a seat belt when traveling in a car. Always wear a helmet when riding a bicycle or motorcycle.

## 2021-12-04 LAB
CHOLESTEROL, TOTAL: 239 MG/DL (ref 0–199)
HDLC SERPL-MCNC: 83 MG/DL (ref 40–59)
LDL CHOLESTEROL CALCULATED: 143 MG/DL (ref 0–129)
TRIGL SERPL-MCNC: 65 MG/DL (ref 0–150)

## 2021-12-05 LAB — VITAMIN D 25-HYDROXY: 43.2 NG/ML (ref 30–100)

## 2021-12-09 ENCOUNTER — ANESTHESIA EVENT (OUTPATIENT)
Dept: OPERATING ROOM | Age: 66
End: 2021-12-09
Payer: MEDICARE

## 2021-12-17 ENCOUNTER — OFFICE VISIT (OUTPATIENT)
Dept: OBGYN CLINIC | Age: 66
End: 2021-12-17
Payer: MEDICARE

## 2021-12-17 VITALS
SYSTOLIC BLOOD PRESSURE: 128 MMHG | HEART RATE: 78 BPM | WEIGHT: 147 LBS | BODY MASS INDEX: 26.04 KG/M2 | DIASTOLIC BLOOD PRESSURE: 70 MMHG

## 2021-12-17 DIAGNOSIS — N39.3 SUI (STRESS URINARY INCONTINENCE, FEMALE): ICD-10-CM

## 2021-12-17 DIAGNOSIS — N36.41 URETHRAL HYPERMOBILITY: Primary | ICD-10-CM

## 2021-12-17 PROCEDURE — 99213 OFFICE O/P EST LOW 20 MIN: CPT | Performed by: OBSTETRICS & GYNECOLOGY

## 2021-12-17 ASSESSMENT — ENCOUNTER SYMPTOMS
ABDOMINAL DISTENTION: 0
ABDOMINAL PAIN: 0
TROUBLE SWALLOWING: 0
BLOOD IN STOOL: 0
BACK PAIN: 0
NAUSEA: 0
SHORTNESS OF BREATH: 0
SORE THROAT: 0
COUGH: 0
WHEEZING: 0
COLOR CHANGE: 0
VOMITING: 0
CHEST TIGHTNESS: 0
CONSTIPATION: 0
VOICE CHANGE: 0

## 2021-12-17 NOTE — PROGRESS NOTES
HPI:  Austen Manzano (: 1955) is a 73 y.o. female, Established patient, here for evaluation of the following chief complaint(s):  Follow-up (stitch)  Tigre Ribera is here preoperatively to evaluate the irritation that she has in her rectal region from a posterior colporrhaphy after we assess her bladder she does have stress urinary incontinence      SUBJECTIVE/OBJECTIVE:    Past Surgical History:   Procedure Laterality Date    APPENDECTOMY      COLONOSCOPY      HYSTERECTOMY      total    KNEE ARTHROSCOPY  age 22   [facility] N/A 2020    APPENDECTOMY LAPAROSCOPIC performed by Sallie Guerrero MD at [address] 10/3/2018    LAPAROSCOPY EXPLORATORY performed by Sallie Guerrero MD at [address] POLYPECTOMY      colon, stomach    DE RECTUM SURGERY PROCEDURE UNLISTED N/A 10/3/2018    TRANSVAGINAL RECTOCELE REPAIR and Diagnostic Laproscopy performed by Sallie Guerrero MD at [facility]      child    UPPER GASTROINTESTINAL ENDOSCOPY  11/5/15     DR. LOVING     UPPER GASTROINTESTINAL ENDOSCOPY N/A 2020    EGD ESOPHAGOGASTRODUODENOSCOPY performed by Sunny Essex, MD at [address] Right 3/3/2021    RIGHT WRIST [address] performed [facility] DO [facility]        Review of Systems   Constitutional: Negative for activity change, appetite change, fatigue and unexpected weight change. HENT: Negative for dental problem, ear pain, hearing loss, nosebleeds, sore throat, trouble swallowing and voice change. Eyes: Negative for visual disturbance. Respiratory: Negative for cough, chest tightness, shortness of breath and wheezing. Cardiovascular: Negative for chest pain and palpitations. Gastrointestinal: Negative for abdominal distention, abdominal pain, blood in stool, constipation, nausea and vomiting.    Endocrine: Negative for cold intolerance, heat intolerance, polydipsia, polyphagia and polyuria. Genitourinary: Negative for difficulty urinating, dyspareunia, dysuria, flank pain, frequency, genital sores, hematuria, menstrual problem, pelvic pain, urgency, vaginal bleeding, vaginal discharge and vaginal pain. Musculoskeletal: Negative for arthralgias, back pain, joint swelling and myalgias. Skin: Negative for color change and rash. Allergic/Immunologic: Negative for environmental allergies, food allergies and immunocompromised state. Neurological: Negative for dizziness, seizures, syncope, speech difficulty, weakness, numbness and headaches. Hematological: Negative for adenopathy. Does not bruise/bleed easily. Psychiatric/Behavioral: Negative for agitation, behavioral problems, confusion, decreased concentration, dysphoric mood and suicidal ideas. The patient is not nervous/anxious and is not hyperactive. Physical Exam  Vitals and nursing note reviewed. Exam conducted with a chaperone present. Constitutional:       Appearance: She is well-developed. HENT:      Head: Normocephalic and atraumatic. Eyes:      Pupils: Pupils are equal, round, and reactive to light. Neck:      Thyroid: No thyromegaly. Trachea: No tracheal deviation. Cardiovascular:      Rate and Rhythm: Normal rate and regular rhythm. Heart sounds: Normal heart sounds. Pulmonary:      Effort: Pulmonary effort is normal.      Breath sounds: Normal breath sounds. Chest:      Chest wall: No tenderness. Abdominal:      General: Bowel sounds are normal. There is no distension. Palpations: Abdomen is soft. There is no mass. Tenderness: There is no abdominal tenderness. There is no guarding or rebound. Hernia: There is no hernia in the left inguinal area. Genitourinary:     Labia:         Right: No rash, tenderness, lesion or injury. Left: No rash, tenderness, lesion or injury. Vagina: Normal. No foreign body.  No vaginal discharge, erythema, tenderness or bleeding. Adnexa:         Right: No mass, tenderness or fullness. Left: No mass, tenderness or fullness. Rectum: Normal. No mass, tenderness, anal fissure, external hemorrhoid or internal hemorrhoid. Normal anal tone. Musculoskeletal:         General: Normal range of motion. Cervical back: Normal range of motion. Lymphadenopathy:      Cervical: No cervical adenopathy. Neurological:      Mental Status: She is alert and oriented to person, place, and time. Vitals:    12/17/21 0919   BP: 128/70   Pulse: 78   Weight: 147 lb (66.7 kg)         ASSESSMENT/PLAN:    Eroded permanent suture in the vaginal wall. Hypermobile urethra with stress urinary incontinence    PLAN: TVT with cystoscopy  Excision of the suture material      No follow-ups on file. On this date 12/17/2021 I have spent 20 minutes reviewing previous notes, test results and face to face with the patient discussing the diagnosis and importance of compliance with the treatment plan as well as documenting on the day of the visit. An electronic signature was used to authenticate this note. Please note this report has been partially produced using speech recognition software and may cause contain errors related to that system including grammar, punctuation and spelling as well as words and phrases that may seem inappropriate. If there are questions or concerns please feel free to contact me to clarify.

## 2021-12-21 ENCOUNTER — OFFICE VISIT (OUTPATIENT)
Dept: FAMILY MEDICINE CLINIC | Age: 66
End: 2021-12-21
Payer: MEDICARE

## 2021-12-21 VITALS
BODY MASS INDEX: 25.87 KG/M2 | WEIGHT: 146 LBS | SYSTOLIC BLOOD PRESSURE: 124 MMHG | TEMPERATURE: 98 F | HEIGHT: 63 IN | DIASTOLIC BLOOD PRESSURE: 74 MMHG | HEART RATE: 65 BPM | OXYGEN SATURATION: 99 %

## 2021-12-21 DIAGNOSIS — Z01.818 PREOPERATIVE EXAMINATION: Primary | ICD-10-CM

## 2021-12-21 LAB
Lab: ABNORMAL
PERFORMING INSTRUMENT: ABNORMAL
QC PASS/FAIL: ABNORMAL
SARS-COV-2, POC: ABNORMAL

## 2021-12-21 PROCEDURE — 87426 SARSCOV CORONAVIRUS AG IA: CPT | Performed by: PHYSICIAN ASSISTANT

## 2021-12-21 PROCEDURE — 99203 OFFICE O/P NEW LOW 30 MIN: CPT | Performed by: PHYSICIAN ASSISTANT

## 2021-12-27 ENCOUNTER — HOSPITAL ENCOUNTER (OUTPATIENT)
Age: 66
Setting detail: OUTPATIENT SURGERY
Discharge: HOME | End: 2021-12-27
Attending: OBSTETRICS & GYNECOLOGY | Admitting: OBSTETRICS & GYNECOLOGY
Payer: MEDICARE

## 2021-12-27 ENCOUNTER — ANESTHESIA (OUTPATIENT)
Dept: OPERATING ROOM | Age: 66
End: 2021-12-27
Payer: MEDICARE

## 2021-12-27 VITALS — OXYGEN SATURATION: 99 % | DIASTOLIC BLOOD PRESSURE: 50 MMHG | SYSTOLIC BLOOD PRESSURE: 83 MMHG

## 2021-12-27 VITALS
SYSTOLIC BLOOD PRESSURE: 138 MMHG | WEIGHT: 146 LBS | DIASTOLIC BLOOD PRESSURE: 66 MMHG | BODY MASS INDEX: 25.87 KG/M2 | HEART RATE: 59 BPM | TEMPERATURE: 97.6 F | HEIGHT: 63 IN | RESPIRATION RATE: 18 BRPM | OXYGEN SATURATION: 100 %

## 2021-12-27 DIAGNOSIS — G89.18 POSTOPERATIVE PAIN: Primary | ICD-10-CM

## 2021-12-27 LAB
EKG ATRIAL RATE: 64 BPM
EKG P AXIS: 61 DEGREES
EKG P-R INTERVAL: 144 MS
EKG Q-T INTERVAL: 424 MS
EKG QRS DURATION: 80 MS
EKG QTC CALCULATION (BAZETT): 437 MS
EKG R AXIS: 49 DEGREES
EKG T AXIS: 60 DEGREES
EKG VENTRICULAR RATE: 64 BPM
SARS-COV-2, NAAT: NOT DETECTED

## 2021-12-27 PROCEDURE — 3600000014 HC SURGERY LEVEL 4 ADDTL 15MIN: Performed by: OBSTETRICS & GYNECOLOGY

## 2021-12-27 PROCEDURE — 7100000010 HC PHASE II RECOVERY - FIRST 15 MIN: Performed by: OBSTETRICS & GYNECOLOGY

## 2021-12-27 PROCEDURE — 3700000000 HC ANESTHESIA ATTENDED CARE: Performed by: OBSTETRICS & GYNECOLOGY

## 2021-12-27 PROCEDURE — 2500000003 HC RX 250 WO HCPCS: Performed by: ANESTHESIOLOGY

## 2021-12-27 PROCEDURE — 6360000002 HC RX W HCPCS: Performed by: STUDENT IN AN ORGANIZED HEALTH CARE EDUCATION/TRAINING PROGRAM

## 2021-12-27 PROCEDURE — 2709999900 HC NON-CHARGEABLE SUPPLY: Performed by: OBSTETRICS & GYNECOLOGY

## 2021-12-27 PROCEDURE — 2580000003 HC RX 258: Performed by: ANESTHESIOLOGY

## 2021-12-27 PROCEDURE — C1771 REP DEV, URINARY, W/SLING: HCPCS | Performed by: OBSTETRICS & GYNECOLOGY

## 2021-12-27 PROCEDURE — 93010 ELECTROCARDIOGRAM REPORT: CPT | Performed by: INTERNAL MEDICINE

## 2021-12-27 PROCEDURE — 2500000003 HC RX 250 WO HCPCS: Performed by: OBSTETRICS & GYNECOLOGY

## 2021-12-27 PROCEDURE — 7100000000 HC PACU RECOVERY - FIRST 15 MIN: Performed by: OBSTETRICS & GYNECOLOGY

## 2021-12-27 PROCEDURE — 2500000003 HC RX 250 WO HCPCS: Performed by: STUDENT IN AN ORGANIZED HEALTH CARE EDUCATION/TRAINING PROGRAM

## 2021-12-27 PROCEDURE — 3600000004 HC SURGERY LEVEL 4 BASE: Performed by: OBSTETRICS & GYNECOLOGY

## 2021-12-27 PROCEDURE — 6360000002 HC RX W HCPCS: Performed by: OBSTETRICS & GYNECOLOGY

## 2021-12-27 PROCEDURE — 7100000011 HC PHASE II RECOVERY - ADDTL 15 MIN: Performed by: OBSTETRICS & GYNECOLOGY

## 2021-12-27 PROCEDURE — 93005 ELECTROCARDIOGRAM TRACING: CPT | Performed by: OBSTETRICS & GYNECOLOGY

## 2021-12-27 PROCEDURE — 57288 REPAIR BLADDER DEFECT: CPT | Performed by: OBSTETRICS & GYNECOLOGY

## 2021-12-27 PROCEDURE — 6370000000 HC RX 637 (ALT 250 FOR IP): Performed by: OBSTETRICS & GYNECOLOGY

## 2021-12-27 PROCEDURE — 7100000001 HC PACU RECOVERY - ADDTL 15 MIN: Performed by: OBSTETRICS & GYNECOLOGY

## 2021-12-27 PROCEDURE — 2580000003 HC RX 258: Performed by: OBSTETRICS & GYNECOLOGY

## 2021-12-27 PROCEDURE — 3700000001 HC ADD 15 MINUTES (ANESTHESIA): Performed by: OBSTETRICS & GYNECOLOGY

## 2021-12-27 PROCEDURE — 87635 SARS-COV-2 COVID-19 AMP PRB: CPT

## 2021-12-27 DEVICE — SLING INCONT TENS FRE SUPP FOR OBT SYS GYNECARE TVT: Type: IMPLANTABLE DEVICE | Site: VAGINA | Status: FUNCTIONAL

## 2021-12-27 RX ORDER — SODIUM CHLORIDE 9 MG/ML
25 INJECTION, SOLUTION INTRAVENOUS PRN
Status: DISCONTINUED | OUTPATIENT
Start: 2021-12-27 | End: 2021-12-27 | Stop reason: HOSPADM

## 2021-12-27 RX ORDER — SODIUM CHLORIDE, SODIUM LACTATE, POTASSIUM CHLORIDE, CALCIUM CHLORIDE 600; 310; 30; 20 MG/100ML; MG/100ML; MG/100ML; MG/100ML
INJECTION, SOLUTION INTRAVENOUS CONTINUOUS
Status: DISCONTINUED | OUTPATIENT
Start: 2021-12-27 | End: 2021-12-27 | Stop reason: HOSPADM

## 2021-12-27 RX ORDER — METOCLOPRAMIDE HYDROCHLORIDE 5 MG/ML
10 INJECTION INTRAMUSCULAR; INTRAVENOUS
Status: DISCONTINUED | OUTPATIENT
Start: 2021-12-27 | End: 2021-12-27 | Stop reason: HOSPADM

## 2021-12-27 RX ORDER — IBUPROFEN 400 MG/1
400 TABLET ORAL EVERY 6 HOURS PRN
Status: DISCONTINUED | OUTPATIENT
Start: 2021-12-27 | End: 2021-12-27 | Stop reason: HOSPADM

## 2021-12-27 RX ORDER — HYDROCODONE BITARTRATE AND ACETAMINOPHEN 5; 325 MG/1; MG/1
2 TABLET ORAL PRN
Status: DISCONTINUED | OUTPATIENT
Start: 2021-12-27 | End: 2021-12-27 | Stop reason: HOSPADM

## 2021-12-27 RX ORDER — LIDOCAINE HYDROCHLORIDE 10 MG/ML
2 INJECTION, SOLUTION EPIDURAL; INFILTRATION; INTRACAUDAL; PERINEURAL ONCE
Status: COMPLETED | OUTPATIENT
Start: 2021-12-27 | End: 2021-12-27

## 2021-12-27 RX ORDER — MIDAZOLAM HYDROCHLORIDE 1 MG/ML
INJECTION INTRAMUSCULAR; INTRAVENOUS PRN
Status: DISCONTINUED | OUTPATIENT
Start: 2021-12-27 | End: 2021-12-27 | Stop reason: SDUPTHER

## 2021-12-27 RX ORDER — SODIUM CHLORIDE 0.9 % (FLUSH) 0.9 %
5-40 SYRINGE (ML) INJECTION EVERY 12 HOURS SCHEDULED
Status: DISCONTINUED | OUTPATIENT
Start: 2021-12-27 | End: 2021-12-27 | Stop reason: HOSPADM

## 2021-12-27 RX ORDER — SODIUM CHLORIDE 0.9 % (FLUSH) 0.9 %
5-40 SYRINGE (ML) INJECTION PRN
Status: DISCONTINUED | OUTPATIENT
Start: 2021-12-27 | End: 2021-12-27 | Stop reason: HOSPADM

## 2021-12-27 RX ORDER — LIDOCAINE HYDROCHLORIDE 20 MG/ML
JELLY TOPICAL PRN
Status: DISCONTINUED | OUTPATIENT
Start: 2021-12-27 | End: 2021-12-27 | Stop reason: ALTCHOICE

## 2021-12-27 RX ORDER — KETOROLAC TROMETHAMINE 30 MG/ML
30 INJECTION, SOLUTION INTRAMUSCULAR; INTRAVENOUS EVERY 6 HOURS
Status: DISCONTINUED | OUTPATIENT
Start: 2021-12-27 | End: 2021-12-27 | Stop reason: HOSPADM

## 2021-12-27 RX ORDER — ONDANSETRON 2 MG/ML
4 INJECTION INTRAMUSCULAR; INTRAVENOUS
Status: DISCONTINUED | OUTPATIENT
Start: 2021-12-27 | End: 2021-12-27 | Stop reason: HOSPADM

## 2021-12-27 RX ORDER — ONDANSETRON 2 MG/ML
INJECTION INTRAMUSCULAR; INTRAVENOUS PRN
Status: DISCONTINUED | OUTPATIENT
Start: 2021-12-27 | End: 2021-12-27 | Stop reason: SDUPTHER

## 2021-12-27 RX ORDER — KETOROLAC TROMETHAMINE 30 MG/ML
INJECTION, SOLUTION INTRAMUSCULAR; INTRAVENOUS
Status: DISCONTINUED
Start: 2021-12-27 | End: 2021-12-27 | Stop reason: HOSPADM

## 2021-12-27 RX ORDER — ONDANSETRON 2 MG/ML
4 INJECTION INTRAMUSCULAR; INTRAVENOUS EVERY 6 HOURS PRN
Status: DISCONTINUED | OUTPATIENT
Start: 2021-12-27 | End: 2021-12-27 | Stop reason: HOSPADM

## 2021-12-27 RX ORDER — SODIUM CHLORIDE 0.9 % (FLUSH) 0.9 %
10 SYRINGE (ML) INJECTION EVERY 12 HOURS SCHEDULED
Status: DISCONTINUED | OUTPATIENT
Start: 2021-12-27 | End: 2021-12-27 | Stop reason: HOSPADM

## 2021-12-27 RX ORDER — MEPERIDINE HYDROCHLORIDE 25 MG/ML
12.5 INJECTION INTRAMUSCULAR; INTRAVENOUS; SUBCUTANEOUS EVERY 5 MIN PRN
Status: DISCONTINUED | OUTPATIENT
Start: 2021-12-27 | End: 2021-12-27 | Stop reason: HOSPADM

## 2021-12-27 RX ORDER — ONDANSETRON 4 MG/1
4 TABLET, ORALLY DISINTEGRATING ORAL EVERY 8 HOURS PRN
Status: DISCONTINUED | OUTPATIENT
Start: 2021-12-27 | End: 2021-12-27 | Stop reason: HOSPADM

## 2021-12-27 RX ORDER — HYDROCODONE BITARTRATE AND ACETAMINOPHEN 5; 325 MG/1; MG/1
1 TABLET ORAL PRN
Status: DISCONTINUED | OUTPATIENT
Start: 2021-12-27 | End: 2021-12-27 | Stop reason: HOSPADM

## 2021-12-27 RX ORDER — GLYCOPYRROLATE 1 MG/5 ML
SYRINGE (ML) INTRAVENOUS PRN
Status: DISCONTINUED | OUTPATIENT
Start: 2021-12-27 | End: 2021-12-27 | Stop reason: SDUPTHER

## 2021-12-27 RX ORDER — FENTANYL CITRATE 50 UG/ML
INJECTION, SOLUTION INTRAMUSCULAR; INTRAVENOUS PRN
Status: DISCONTINUED | OUTPATIENT
Start: 2021-12-27 | End: 2021-12-27 | Stop reason: SDUPTHER

## 2021-12-27 RX ORDER — DIPHENHYDRAMINE HYDROCHLORIDE 50 MG/ML
12.5 INJECTION INTRAMUSCULAR; INTRAVENOUS
Status: DISCONTINUED | OUTPATIENT
Start: 2021-12-27 | End: 2021-12-27 | Stop reason: HOSPADM

## 2021-12-27 RX ORDER — MAGNESIUM HYDROXIDE 1200 MG/15ML
LIQUID ORAL CONTINUOUS PRN
Status: COMPLETED | OUTPATIENT
Start: 2021-12-27 | End: 2021-12-27

## 2021-12-27 RX ORDER — FENTANYL CITRATE 50 UG/ML
50 INJECTION, SOLUTION INTRAMUSCULAR; INTRAVENOUS EVERY 10 MIN PRN
Status: DISCONTINUED | OUTPATIENT
Start: 2021-12-27 | End: 2021-12-27 | Stop reason: HOSPADM

## 2021-12-27 RX ORDER — LIDOCAINE HYDROCHLORIDE AND EPINEPHRINE 10; 10 MG/ML; UG/ML
INJECTION, SOLUTION INFILTRATION; PERINEURAL PRN
Status: DISCONTINUED | OUTPATIENT
Start: 2021-12-27 | End: 2021-12-27 | Stop reason: ALTCHOICE

## 2021-12-27 RX ORDER — LIDOCAINE HYDROCHLORIDE 10 MG/ML
1 INJECTION, SOLUTION EPIDURAL; INFILTRATION; INTRACAUDAL; PERINEURAL
Status: DISCONTINUED | OUTPATIENT
Start: 2021-12-27 | End: 2021-12-27 | Stop reason: HOSPADM

## 2021-12-27 RX ORDER — HYDROCODONE BITARTRATE AND ACETAMINOPHEN 5; 325 MG/1; MG/1
1 TABLET ORAL EVERY 6 HOURS PRN
Qty: 12 TABLET | Refills: 0 | Status: SHIPPED | OUTPATIENT
Start: 2021-12-27 | End: 2021-12-30

## 2021-12-27 RX ORDER — PROPOFOL 10 MG/ML
INJECTION, EMULSION INTRAVENOUS PRN
Status: DISCONTINUED | OUTPATIENT
Start: 2021-12-27 | End: 2021-12-27 | Stop reason: SDUPTHER

## 2021-12-27 RX ORDER — SODIUM CHLORIDE 0.9 % (FLUSH) 0.9 %
10 SYRINGE (ML) INJECTION PRN
Status: DISCONTINUED | OUTPATIENT
Start: 2021-12-27 | End: 2021-12-27 | Stop reason: HOSPADM

## 2021-12-27 RX ADMIN — FENTANYL CITRATE 50 MCG: 50 INJECTION, SOLUTION INTRAMUSCULAR; INTRAVENOUS at 09:09

## 2021-12-27 RX ADMIN — KETOROLAC TROMETHAMINE 30 MG: 30 INJECTION, SOLUTION INTRAMUSCULAR at 09:47

## 2021-12-27 RX ADMIN — Medication 0.2 MG: at 08:59

## 2021-12-27 RX ADMIN — SODIUM CHLORIDE, POTASSIUM CHLORIDE, SODIUM LACTATE AND CALCIUM CHLORIDE 1000 ML: 600; 310; 30; 20 INJECTION, SOLUTION INTRAVENOUS at 08:29

## 2021-12-27 RX ADMIN — ONDANSETRON 4 MG: 2 INJECTION INTRAMUSCULAR; INTRAVENOUS at 08:59

## 2021-12-27 RX ADMIN — FENTANYL CITRATE 50 MCG: 50 INJECTION, SOLUTION INTRAMUSCULAR; INTRAVENOUS at 08:37

## 2021-12-27 RX ADMIN — MIDAZOLAM HYDROCHLORIDE 2 MG: 1 INJECTION, SOLUTION INTRAMUSCULAR; INTRAVENOUS at 08:29

## 2021-12-27 RX ADMIN — PROPOFOL 200 MG: 10 INJECTION, EMULSION INTRAVENOUS at 08:37

## 2021-12-27 RX ADMIN — LIDOCAINE HYDROCHLORIDE 0.1 ML: 10 INJECTION, SOLUTION EPIDURAL; INFILTRATION; INTRACAUDAL; PERINEURAL at 08:29

## 2021-12-27 RX ADMIN — CEFAZOLIN SODIUM 2 G: 10 INJECTION, POWDER, FOR SOLUTION INTRAVENOUS at 08:32

## 2021-12-27 ASSESSMENT — PULMONARY FUNCTION TESTS
PIF_VALUE: 10
PIF_VALUE: 10
PIF_VALUE: 11
PIF_VALUE: 0
PIF_VALUE: 10
PIF_VALUE: 0
PIF_VALUE: 0
PIF_VALUE: 6
PIF_VALUE: 10
PIF_VALUE: 10
PIF_VALUE: 4
PIF_VALUE: 4
PIF_VALUE: 10
PIF_VALUE: 11
PIF_VALUE: 10
PIF_VALUE: 11
PIF_VALUE: 1
PIF_VALUE: 11
PIF_VALUE: 10
PIF_VALUE: 6
PIF_VALUE: 10
PIF_VALUE: 11
PIF_VALUE: 10
PIF_VALUE: 12
PIF_VALUE: 10
PIF_VALUE: 10
PIF_VALUE: 11
PIF_VALUE: 10
PIF_VALUE: 11
PIF_VALUE: 11
PIF_VALUE: 10
PIF_VALUE: 7
PIF_VALUE: 10
PIF_VALUE: 4
PIF_VALUE: 10

## 2021-12-27 ASSESSMENT — PAIN SCALES - GENERAL: PAINLEVEL_OUTOF10: 1

## 2021-12-27 NOTE — ANESTHESIA POSTPROCEDURE EVALUATION
Department of Anesthesiology  Postprocedure Note    Patient: Ambrosio Downing  MRN: [id number]  YOB: 1955  Date of evaluation: 12/27/2021  Time:  9:19 AM     Procedure Summary     Date: 12/27/21 Room / Location: [address]    Anesthesia Start: 5481 Anesthesia Stop:     Procedure: TRANSVAGINAL TENSION FREE VAGINAL TAPE- OBTURATOR CYSTOSCOPY, STICH REMOVAL RECTOCELE (N/A ) Diagnosis: (URETHRAL HYPERMOBILITY, JOSUÉ)    Surgeons: Alexus Martinez DO Responsible Provider: Halina Chappell DO    Anesthesia Type: general ASA Status: 2          Anesthesia Type: General   Estefany Phase I: Estefany Score: 10    Estefany Phase II:      Last vitals: Reviewed and per EMR flowsheets.        Anesthesia Post Evaluation    Patient location during evaluation: bedside  Patient participation: complete - patient participated  Level of consciousness: awake and awake and alert  Pain score: 0  Airway patency: patent  Nausea & Vomiting: no nausea and no vomiting  Complications: no  Cardiovascular status: blood pressure returned to baseline and hemodynamically stable  Respiratory status: acceptable  Hydration status: euvolemic

## 2021-12-27 NOTE — ANESTHESIA PRE PROCEDURE
Department of Anesthesiology  Preprocedure Note       Name:  Chloé Nguyen   Age:  73 y.o.  :  1955                                          MRN:  [id number]         Date:  2021      Surgeon: Andrés Griffin):  Kasie Cook DO    Procedure: Procedure(s):  TRANSVAGINAL TENSION FREE VAGINAL TAPE- OBTURATOR CYSTOSCOPY, STICH REMOVAL RECTOCELE ([facility])REQUESTING   [address]    Medications prior to admission:   Prior to Admission medications    Medication Sig Start Date End Date Taking? Authorizing Provider   dextromethorphan-guaiFENesin [facility] DM)  MG per extended release tablet Take 1 tablet by mouth every 12 hours as needed   Yes Historical Provider, MD   estradiol (CLIMARA) 0.1 MG/24HR Apply one patch twice weekly 21  Yes Evelyn Quinones DO   calcium-vitamin D ([address]) 500-200 MG-UNIT per tablet Take 1 tablet by mouth daily    Yes Historical Provider, MD   albuterol sulfate  (90 Base) MCG/ACT inhaler Inhale 2 puffs into the lungs every 6 hours as needed for Wheezing 12/3/21   PAUL Rueda CNP   fluticasone [facility]) 50 MCG/ACT nasal spray 1 spray by Each Nostril route daily  Patient not taking: Reported on 2021 12/3/21   PAUL Rueda CNP       Current medications:    Current Facility-Administered Medications   Medication Dose Route Frequency Provider Last Rate Last Admin    ceFAZolin (ANCEF) 2000 mg in dextrose 5 % 100 mL IVPB  2,000 mg IntraVENous On Call to Eve[place]DO leslie        lactated ringers infusion   IntraVENous Continuous Mario Sherwood MD        lidocaine PF 1 % injection 2 mL  2 mL IntraDERmal Once Rocky Aquino MD           Allergies:     Allergies   Allergen Reactions    Sulfamethoxazole-Trimethoprim Itching and Rash       Problem List:    Patient Active Problem List   Diagnosis Code    Osteopenia M85.80    Vitamin D deficiency E55.9    Asthma J45.909    Gastroesophageal reflux disease K21.9    Pain in lower limb M79.606    Synovitis and tenosynovitis M65.9    Vaginal enterocele N81.5    Acute appendicitis with generalized peritonitis K35.20    Melena K92.1    Esophagitis K20.90       Past Medical History:        Diagnosis Date    Asthma     GERD (gastroesophageal reflux disease)     Osteopenia        Past Surgical History:        Procedure Laterality Date    APPENDECTOMY      COLONOSCOPY      HYSTERECTOMY  2004    total    KNEE ARTHROSCOPY  age 22   [place] LAPAROSCOPIC APPENDECTOMY N/A 2/4/2020    APPENDECTOMY LAPAROSCOPIC performed by Delilah Hawk MD at [address] 10/3/2018    LAPAROSCOPY EXPLORATORY performed by Delilah Hawk MD at [address] POLYPECTOMY  2010    colon, stomach    [place] RECTUM SURGERY PROCEDURE UNLISTED N/A 10/3/2018    TRANSVAGINAL RECTOCELE REPAIR and Diagnostic Laproscopy performed by Delilah Hawk MD at [address] [facility]    UPPER GASTROINTESTINAL ENDOSCOPY  11/5/15     DR. LOVING     UPPER GASTROINTESTINAL ENDOSCOPY N/A 2/27/2020    EGD ESOPHAGOGASTRODUODENOSCOPY performed by Kory Clarke MD at [address] Right 3/3/2021    RIGHT WRIST [address] performed by Madelyn Cotto DO at [address] History:    Social History     Tobacco Use    Smoking status: Never Smoker    Smokeless tobacco: Never Used   Substance Use Topics    Alcohol use: Yes     Comment: socially                                Counseling given: Not Answered      Vital Signs (Current):   Vitals:    12/27/21 0801   BP: (!) 145/70   Pulse: 69   Resp: 20   Temp: 97.7 °F (36.5 °C)   TempSrc: Temporal   SpO2: 98%   Weight: 146 lb (66.2 kg)   Height: 5' 3\" (1.6 m)                                              BP Readings from Last 3 Encounters:   12/27/21 (!) 145/70   12/21/21 124/74   12/17/21 128/70       NPO Status: Time of last liquid consumption: 0000                        Time of last solid reviewed               Beta Blocker:  Not on Beta Blocker      ROS comment: Normal sinus rhythm  Normal ECG  When compared with ECG of 17-FEB-2020 22:17,  QRS axis shifted left  Criteria for Septal infarct are no longer present  Nonspecific T wave abnormality, improved in Inferior leads  T wave inversion no longer evident in Anterior leads     Neuro/Psych:   Negative Neuro/Psych ROS              GI/Hepatic/Renal:   (+) GERD:,           Endo/Other: Negative Endo/Other ROS             Pt had PAT visit. Abdominal:             Vascular: negative vascular ROS. Other Findings:             Anesthesia Plan      general     ASA 2     (LMA)  Induction: intravenous. MIPS: Postoperative opioids intended and Prophylactic antiemetics administered. Anesthetic plan and risks discussed with patient. Plan discussed with CRNA.     Attending anesthesiologist reviewed and agrees with Tj Sandoval DO   12/27/2021

## 2021-12-27 NOTE — BRIEF OP NOTE
Brief Postoperative Note      Patient: Kurtis Nieto  YOB: 1955  MRN: [id number]    Date of Procedure: 12/27/2021    Pre-Op Diagnosis: URETHRAL HYPERMOBILITY, JOSUÉ  Retained permanent suture on vaginal mucosa  Post-Op Diagnosis: Same       Procedure(s):  TRANSVAGINAL TENSION FREE VAGINAL TAPE- OBTURATOR CYSTOSCOPY, STICH REMOVAL RECTOCELE    Surgeon(s):  Pretty Madden DO    Assistant:  * No surgical staff found *    Anesthesia: General    Estimated Blood Loss (mL): Minimal    Complications: None    Specimens:   * No specimens in log *    Implants:  Implant Name Type Inv. Item Serial No.  Lot No. LRB No. Used Action   SLING INCONT TENS RACHEL SUPP FOR OBT SYS GYNECARE TVT - [id number]  SLING INCONT TENS RACHEL SUPP FOR OBT SYS GYNECARE TVT [id number] [facility] [id number] N/A 1 Implanted         Drains:   Urethral Catheter Triple-lumen (Active)       Findings: 2 retained ethibond sutures from rectocele repair that eroded through vaginal mucosa were removed. # 8 dilator used for tvto.  Normal bladder    Electronically signed by Pretty Madden DO on 12/27/2021 at 9:14 AM

## 2021-12-27 NOTE — PROGRESS NOTES
CLINICAL PHARMACY NOTE: MEDS TO BEDS    Total # of Prescriptions Filled: 1   The following medications were delivered to the patient:  · Hydrocodone-Apap 5-325 mg Tab    Additional Documentation:

## 2021-12-27 NOTE — OP NOTE
Operative Report  PATIENT NAME: Carlo Kerr RECORD NO. [id number]  SURGEON: Haseeb De La Torre  Primary Care Physician: Lester Phan MD     PROCEDURE PERFORMED:  12/27/2021  PREOPERATIVE DIAGNOSIS: Stress urinary incontinence, hypermobile urethra, eroded permanent suture into the vaginal canal  POSTOPERATIVE DIAGNOSIS: Same   PROCEDURE PERFORMED: Excision of 2 permanent sutures that had eroded through the vaginal mucosa  TVT-O and a cystoscopy. SURGEON:  Dr. Kacie Khannar:  GET  ESTIMATED BLOOD LOSS: Minimal ml  SPECIMEN: Suture material  COMPLICATIONS:  None immediately appreciated. DISCUSSION: Will Bradley is a 73y.o. year old female who presents with a hypermobile urethra and stress urinary incontinence. She had a previous rectocele repair and has had erosion of the Ethibond suture were excised with chronic discharge and irritation as a result  After history and physical examination was performed, potential diagnostic and therapeutic modalities discussed with the patient. She was given opportunity to ask questions. Once answered informed consent was obtained. She was brought to operating room on 12/27/2021 for procedure. PROCEDURE:   The patient was taken to the operating room where  general anesthetic was administered. She was then placed in the dorsal  lithotomy position, prepped and draped in usual fashion. The bladder  was drained with a Vance catheter. Time-out was called. Exam under  Anesthesia was performed no pelvic masses. Short vagina with a cystocele and a hypermobile urethra. the 2 pieces of the ethibond suture that had eroded through the vaginal mucosa were then excised the area of a irritated vaginal mucosa was fulgurated with bipolar cautery . The TVT-O was then  performed. The weighted speculum was placed in the vagina. The area  where the tape would exit was at the level of the clitoris and 2 cm  lateral to the intertriginous fold bilaterally.   A small mid

## 2022-01-11 ENCOUNTER — OFFICE VISIT (OUTPATIENT)
Dept: OBGYN CLINIC | Age: 67
End: 2022-01-11

## 2022-01-11 VITALS
WEIGHT: 146 LBS | DIASTOLIC BLOOD PRESSURE: 68 MMHG | HEART RATE: 66 BPM | SYSTOLIC BLOOD PRESSURE: 120 MMHG | BODY MASS INDEX: 25.86 KG/M2

## 2022-01-11 DIAGNOSIS — Z09 POSTOP CHECK: Primary | ICD-10-CM

## 2022-01-11 PROCEDURE — 99024 POSTOP FOLLOW-UP VISIT: CPT | Performed by: OBSTETRICS & GYNECOLOGY

## 2022-01-11 ASSESSMENT — ENCOUNTER SYMPTOMS
SHORTNESS OF BREATH: 0
CHEST TIGHTNESS: 0
CONSTIPATION: 0
NAUSEA: 0
VOMITING: 0
BLOOD IN STOOL: 0
ABDOMINAL DISTENTION: 0
TROUBLE SWALLOWING: 0
WHEEZING: 0
COLOR CHANGE: 0
VOICE CHANGE: 0
SORE THROAT: 0
ABDOMINAL PAIN: 0
COUGH: 0
BACK PAIN: 0

## 2022-01-11 NOTE — PROGRESS NOTES
HPI:  Andrade Blue (: 1955) is a 73 y.o. female, Established patient, here for evaluation of the following chief complaint(s):  Post-Op Check  Is here 2 weeks following a transvaginal tape procedure as well as excision of permanent suture that had eroded through on her rectocele repair is doing very well she states he is sleeping through the night now because she does not have to worry about getting up to urinate 4 or 5 times a night      SUBJECTIVE/OBJECTIVE:    Past Surgical History:   Procedure Laterality Date    APPENDECTOMY      BLADDER SUSPENSION N/A 2021    TRANSVAGINAL TENSION FREE VAGINAL TAPE- OBTURATOR CYSTOSCOPY, STICH REMOVAL RECTOCELE performed by Yue Alvarez DO at [address] [facility]      total    KNEE ARTHROSCOPY  age 22   [facility] LAPAROSCOPIC APPENDECTOMY N/A 2020    APPENDECTOMY LAPAROSCOPIC performed by Milena Olmstead MD at [address] 10/3/2018    LAPAROSCOPY EXPLORATORY performed by Milena Olmstead MD at [address] POLYPECTOMY      colon, stomach    CT RECTUM SURGERY PROCEDURE UNLISTED N/A 10/3/2018    TRANSVAGINAL RECTOCELE REPAIR and Diagnostic Laproscopy performed by Milena Olmstead MD at [address] [facility] ENDOSCOPY  11/5/15     DR. LOVING     UPPER GASTROINTESTINAL ENDOSCOPY N/A 2020    EGD ESOPHAGOGASTRODUODENOSCOPY performed by Meghan Montero MD at [address] Right 3/3/2021    RIGHT WRIST [address] performed by John Garduno DO at [facility]        Review of Systems   Constitutional: Negative for activity change, appetite change, fatigue and unexpected weight change. HENT: Negative for dental problem, ear pain, hearing loss, nosebleeds, sore throat, trouble swallowing and voice change. Eyes: Negative for visual disturbance.    Respiratory: Negative for cough, chest tightness, shortness of breath and wheezing. Cardiovascular: Negative for chest pain and palpitations. Gastrointestinal: Negative for abdominal distention, abdominal pain, blood in stool, constipation, nausea and vomiting. Endocrine: Negative for cold intolerance, heat intolerance, polydipsia, polyphagia and polyuria. Genitourinary: Negative for difficulty urinating, dyspareunia, dysuria, flank pain, frequency, genital sores, hematuria, menstrual problem, pelvic pain, urgency, vaginal bleeding, vaginal discharge and vaginal pain. Musculoskeletal: Negative for arthralgias, back pain, joint swelling and myalgias. Skin: Negative for color change and rash. Allergic/Immunologic: Negative for environmental allergies, food allergies and immunocompromised state. Neurological: Negative for dizziness, seizures, syncope, speech difficulty, weakness, numbness and headaches. Hematological: Negative for adenopathy. Does not bruise/bleed easily. Psychiatric/Behavioral: Negative for agitation, behavioral problems, confusion, decreased concentration, dysphoric mood and suicidal ideas. The patient is not nervous/anxious and is not hyperactive. Physical Exam  Vitals and nursing note reviewed. Exam conducted with a chaperone present. Genitourinary:                  Vitals:    01/11/22 1445   BP: 120/68   Pulse: 66   Weight: 146 lb (66.2 kg)         ASSESSMENT/PLAN:    Postop exam following TVT    PLAN: Follow-up in 1 year or as needed. no restrictions at this point      No follow-ups on file. On this date 1/11/2022 I have spent 20 minutes reviewing previous notes, test results and face to face with the patient discussing the diagnosis and importance of compliance with the treatment plan as well as documenting on the day of the visit. An electronic signature was used to authenticate this note.  Please note this report has been partially produced using speech recognition software and may cause contain errors related to that system including grammar, punctuation and spelling as well as words and phrases that may seem inappropriate. If there are questions or concerns please feel free to contact me to clarify.

## 2022-01-25 RX ORDER — FLUTICASONE PROPIONATE 50 MCG
SPRAY, SUSPENSION (ML) NASAL
Qty: 1 EACH | Refills: 3 | Status: SHIPPED | OUTPATIENT
Start: 2022-01-25 | End: 2022-02-08 | Stop reason: SDUPTHER

## 2022-01-26 ASSESSMENT — ENCOUNTER SYMPTOMS
COUGH: 0
BLOOD IN STOOL: 0
EYE DISCHARGE: 0
PHOTOPHOBIA: 0
EYE REDNESS: 0
EYE PAIN: 0
SHORTNESS OF BREATH: 0
VOMITING: 0
CONSTIPATION: 0
EYES NEGATIVE: 1
NAUSEA: 0

## 2022-01-26 NOTE — PROGRESS NOTES
 Years of education: Not on file    Highest education level: Not on file   Occupational History    Not on file   Tobacco Use    Smoking status: Never Smoker    Smokeless tobacco: Never Used   Vaping Use    Vaping Use: Never used   Substance and Sexual Activity    Alcohol use: Yes     Comment: socially    Drug use: No    Sexual activity: Yes     Partners: Male   Other Topics Concern    Not on file   Social History Narrative    Not on file     Social Determinants of Health     Financial Resource Strain: Low Risk     Difficulty of Paying Living Expenses: Not hard at all   Food Insecurity: No Food Insecurity    Worried About [address] in the Last Year: Never true    [facility] in the Last Year: Never true   Transportation Needs:     Lack of Transportation (Medical): Not on file    Lack of Transportation (Non-Medical):  Not on file   Physical Activity:     Days of Exercise per Week: Not on file    Minutes of Exercise per Session: Not on file   Stress:     Feeling of Stress : Not on file   Social Connections:     Frequency of Communication with Friends and Family: Not on file    Frequency of Social Gatherings with Friends and Family: Not on file    Attends [employer] Services: Not on file    Active Member of [address] [place] [address] or Organizations: Not on file    Attends Club or Organization Meetings: Not on file    Marital Status: Not on file   Intimate Partner Violence:     Fear of Current or Ex-Partner: Not on file    Emotionally Abused: Not on file    Physically Abused: Not on file    Sexually Abused: Not on file   Housing Stability:     Unable to Pay for Housing in the Last Year: Not on file    Number of [place] in the Last Year: Not on file    Unstable Housing in the Last Year: Not on file     Current Outpatient Medications on File Prior to Visit   Medication Sig Dispense Refill    estradiol (CLIMARA) 0.1 MG/24HR Apply one patch twice weekly 24 patch 3    albuterol sulfate HFA 108 (90 Base) MCG/ACT inhaler Inhale 2 puffs into the lungs every 6 hours as needed for Wheezing 1 each 3    dextromethorphan-guaiFENesin (MUCINEX DM)  MG per extended release tablet Take 1 tablet by mouth every 12 hours as needed      calcium-vitamin D (OSCAL-500) 500-200 MG-UNIT per tablet Take 1 tablet by mouth daily        No current facility-administered medications on file prior to visit. Sulfamethoxazole-trimethoprim    Review of Systems   Constitutional: Negative for activity change and chills. HENT: Negative for ear discharge, ear pain, hearing loss, nosebleeds and tinnitus. Eyes: Negative. Negative for photophobia, pain, discharge and redness. Respiratory: Negative for cough and shortness of breath. Cardiovascular: Negative for chest pain. Gastrointestinal: Negative for blood in stool, constipation, nausea and vomiting. Endocrine: Negative for polydipsia. Musculoskeletal: Negative. Negative for myalgias. Skin: Negative for rash. Neurological: Negative for dizziness, weakness and headaches. Hematological: Negative. Does not bruise/bleed easily. Psychiatric/Behavioral: Negative. Negative for hallucinations and suicidal ideas. All other systems reviewed and are negative. Objective:   /74 (Site: Left Upper Arm, Position: Sitting)   Pulse 65   Temp 98 °F (36.7 °C) (Temporal)   Ht 5' 3\" (1.6 m)   Wt 146 lb (66.2 kg)   SpO2 99%   BMI 25.86 kg/m²     Physical Exam  Vitals and nursing note reviewed. Constitutional:       Appearance: She is well-developed. HENT:      Head: Normocephalic and atraumatic. Right Ear: Tympanic membrane normal.      Left Ear: Tympanic membrane normal.      Nose: Nose normal.      Mouth/Throat:      Mouth: Mucous membranes are moist.   Eyes:      General: No scleral icterus. Conjunctiva/sclera: Conjunctivae normal.      Pupils: Pupils are equal, round, and reactive to light. Neck:      Thyroid: No thyromegaly. types were placed in this encounter. No follow-ups on file. Reviewed with the patient: current clinicalstatus, medications, activities and diet. Side effects, adverse effects of the medication prescribedtoday, as well as treatment plan/ rationale and result expectations have been discussedwith the patient who expresses understanding and desires to proceed. Close follow upto evaluate treatment results and for coordination of care. I have reviewedthe patient's medical history in detail and updated the computerized patient record.     Gerson Polanco PA-C

## 2022-02-08 RX ORDER — FLUTICASONE PROPIONATE 50 MCG
SPRAY, SUSPENSION (ML) NASAL
Qty: 3 EACH | Refills: 3 | Status: SHIPPED | OUTPATIENT
Start: 2022-02-08

## 2022-07-11 ENCOUNTER — OFFICE VISIT (OUTPATIENT)
Dept: FAMILY MEDICINE CLINIC | Age: 67
End: 2022-07-11
Payer: MEDICARE

## 2022-07-11 VITALS
HEIGHT: 63 IN | SYSTOLIC BLOOD PRESSURE: 130 MMHG | BODY MASS INDEX: 25.86 KG/M2 | HEART RATE: 71 BPM | OXYGEN SATURATION: 99 % | DIASTOLIC BLOOD PRESSURE: 76 MMHG | RESPIRATION RATE: 14 BRPM | TEMPERATURE: 97.7 F

## 2022-07-11 DIAGNOSIS — J45.21 MILD INTERMITTENT ASTHMA WITH ACUTE EXACERBATION: Primary | ICD-10-CM

## 2022-07-11 DIAGNOSIS — J01.90 ACUTE BACTERIAL SINUSITIS: ICD-10-CM

## 2022-07-11 DIAGNOSIS — B96.89 ACUTE BACTERIAL SINUSITIS: ICD-10-CM

## 2022-07-11 PROCEDURE — 1123F ACP DISCUSS/DSCN MKR DOCD: CPT | Performed by: FAMILY MEDICINE

## 2022-07-11 PROCEDURE — 99213 OFFICE O/P EST LOW 20 MIN: CPT | Performed by: FAMILY MEDICINE

## 2022-07-11 RX ORDER — PREDNISONE 10 MG/1
TABLET ORAL
Qty: 40 TABLET | Refills: 0 | Status: SHIPPED | OUTPATIENT
Start: 2022-07-11

## 2022-07-11 RX ORDER — CEFDINIR 300 MG/1
300 CAPSULE ORAL 2 TIMES DAILY
Qty: 20 CAPSULE | Refills: 0 | Status: SHIPPED | OUTPATIENT
Start: 2022-07-11 | End: 2022-07-21

## 2022-07-11 ASSESSMENT — ENCOUNTER SYMPTOMS
WHEEZING: 1
RHINORRHEA: 0
COUGH: 1
GASTROINTESTINAL NEGATIVE: 1
SPUTUM PRODUCTION: 1
EYES NEGATIVE: 1
CHEST TIGHTNESS: 0

## 2022-07-11 ASSESSMENT — PATIENT HEALTH QUESTIONNAIRE - PHQ9
1. LITTLE INTEREST OR PLEASURE IN DOING THINGS: 0
SUM OF ALL RESPONSES TO PHQ QUESTIONS 1-9: 0
2. FEELING DOWN, DEPRESSED OR HOPELESS: 0
SUM OF ALL RESPONSES TO PHQ QUESTIONS 1-9: 0
SUM OF ALL RESPONSES TO PHQ9 QUESTIONS 1 & 2: 0
SUM OF ALL RESPONSES TO PHQ QUESTIONS 1-9: 0
SUM OF ALL RESPONSES TO PHQ QUESTIONS 1-9: 0

## 2022-07-11 NOTE — PROGRESS NOTES
Patient is seen in follow up for   Chief Complaint   Patient presents with    Asthma     cough is bad    Allergies     Asthma  She complains of cough, sputum production and wheezing. This is a new problem. The current episode started in the past 7 days. The problem occurs intermittently. The problem has been unchanged. The cough is productive of sputum. Pertinent negatives include no appetite change, fever or rhinorrhea. Her symptoms are aggravated by nothing. Her past medical history is significant for asthma. Past Medical History:   Diagnosis Date    Asthma     GERD (gastroesophageal reflux disease)     Osteopenia      Patient Active Problem List    Diagnosis Date Noted    Stress incontinence     Melena     Esophagitis     Acute appendicitis with generalized peritonitis     Vaginal enterocele 10/03/2018    Vitamin D deficiency 02/13/2014    Osteopenia     Asthma     Gastroesophageal reflux disease     Pain in lower limb 10/20/2011    Synovitis and tenosynovitis 10/20/2011     Past Surgical History:   Procedure Laterality Date    APPENDECTOMY      BLADDER SUSPENSION N/A 12/27/2021    TRANSVAGINAL TENSION FREE VAGINAL TAPE- OBTURATOR CYSTOSCOPY, STICH REMOVAL RECTOCELE [facility] [address] (CERVIX STATUS UNKNOWN)  2004    total    KNEE ARTHROSCOPY  age 22   [place]  LAPAROSCOPIC APPENDECTOMY N/A 2/4/2020    APPENDECTOMY LAPAROSCOPIC performed by Mally Wang MD at [address] 10/3/2018    LAPAROSCOPY EXPLORATORY performed by Mally Wang MD at [address] POLYPECTOMY  2010    colon, stomach    [place] RECTUM SURGERY PROCEDURE UNLISTED N/A 10/3/2018    TRANSVAGINAL RECTOCELE REPAIR and Diagnostic Laproscopy performed by Mally Wang MD at [address] [facility]    UPPER GASTROINTESTINAL ENDOSCOPY  11/5/15     DR. LOVING     UPPER GASTROINTESTINAL ENDOSCOPY N/A 2/27/2020 EGD ESOPHAGOGASTRODUODENOSCOPY performed by Reshma Najera MD at [address] Right 3/3/2021    RIGHT WRIST [address] performed by Deirder Suazo DO at [address] History   Problem Relation Age of Onset    Arthritis Mother         RA   Rebbecca Hinders Asthma Mother     Kidney Disease Mother     Arthritis Father         RA    Cancer Father         prostate    Heart Disease Father     High Blood Pressure Father     High Cholesterol Father     Stroke Father     Other Father         melenoma    Breast Cancer Neg Hx      Social History     Socioeconomic History    Marital status:      Spouse name: None    Number of children: None    Years of education: None    Highest education level: None   Occupational History    None   Tobacco Use    Smoking status: Never Smoker    Smokeless tobacco: Never Used   Vaping Use    Vaping Use: Never used   Substance and Sexual Activity    Alcohol use: Yes     Comment: socially    Drug use: No    Sexual activity: Yes     Partners: Male   Other Topics Concern    None   Social History Narrative    None     Social Determinants of Health     Financial Resource Strain: Low Risk     Difficulty of Paying Living Expenses: Not hard at all   Food Insecurity: No Food Insecurity    Worried About Running Out of Food in the Last Year: Never true    [address] in the Last Year: Never true   Transportation Needs:     Lack of Transportation (Medical): Not on file    Lack of Transportation (Non-Medical):  Not on file   Physical Activity:     Days of Exercise per Week: Not on file    Minutes of Exercise per Session: Not on file   Stress:     Feeling of Stress : Not on file   Social Connections:     Frequency of Communication with Friends and Family: Not on file    Frequency of Social Gatherings with Friends and Family: Not on file    Attends Congregation Services: Not on file    Active Member of Clubs or Organizations: Not on file    Attends Club or Organization Meetings: Not on file    Marital Status: Not on file   Intimate Partner Violence:     Fear of Current or Ex-Partner: Not on file    Emotionally Abused: Not on file    Physically Abused: Not on file    Sexually Abused: Not on file   Housing Stability:     Unable to Pay for Housing in the Last Year: Not on file    Number of Isiah in the Last Year: Not on file    Unstable Housing in the Last Year: Not on file     Current Outpatient Medications   Medication Sig Dispense Refill    cefdinir (OMNICEF) 300 MG capsule Take 1 capsule by mouth 2 times daily for 10 days 20 capsule 0    predniSONE (DELTASONE) 10 MG tablet 4 po for 4 days 3 po for 4 days 2 po for 4 days 1 po for 4 days 40 tablet 0    fluticasone (FLONASE) 50 MCG/ACT nasal spray SPRAY 1 SPRAY INTO EACH NOSTRIL EVERY DAY 3 each 3    dextromethorphan-guaiFENesin (MUCINEX DM)  MG per extended release tablet Take 1 tablet by mouth every 12 hours as needed      estradiol (CLIMARA) 0.1 MG/24HR Apply one patch twice weekly 24 patch 3    albuterol sulfate  (90 Base) MCG/ACT inhaler Inhale 2 puffs into the lungs every 6 hours as needed for Wheezing 1 each 3    calcium-vitamin D (OSCAL-500) 500-200 MG-UNIT per tablet Take 1 tablet by mouth daily        No current facility-administered medications for this visit.      Current Outpatient Medications on File Prior to Visit   Medication Sig Dispense Refill    fluticasone (FLONASE) 50 MCG/ACT nasal spray SPRAY 1 SPRAY INTO EACH NOSTRIL EVERY DAY 3 each 3    dextromethorphan-guaiFENesin (MUCINEX DM)  MG per extended release tablet Take 1 tablet by mouth every 12 hours as needed      estradiol (CLIMARA) 0.1 MG/24HR Apply one patch twice weekly 24 patch 3    albuterol sulfate  (90 Base) MCG/ACT inhaler Inhale 2 puffs into the lungs every 6 hours as needed for Wheezing 1 each 3    calcium-vitamin D (OSCAL-500) 500-200 MG-UNIT per tablet Take 1 tablet by mouth daily        No current facility-administered medications on file prior to visit. Allergies   Allergen Reactions    Sulfamethoxazole-Trimethoprim Itching and Rash     Health Maintenance   Topic Date Due    Shingles vaccine (1 of 2) Never done    Flu vaccine (1) 09/01/2022    Depression Screen  12/03/2022    Annual Wellness Visit (AWV)  12/04/2022    DTaP/Tdap/Td vaccine (3 - Td or Tdap) 08/13/2023    Breast cancer screen  11/22/2023    Colorectal Cancer Screen  11/05/2025    Lipids  12/03/2026    DEXA (modify frequency per FRAX score)  Completed    Pneumococcal 65+ years Vaccine  Completed    COVID-19 Vaccine  Completed    Hepatitis C screen  Addressed    Hepatitis A vaccine  Aged Out    Hepatitis B vaccine  Aged Out    Hib vaccine  Aged Out    Meningococcal (ACWY) vaccine  Aged Out       Review of Systems     Review of Systems   Constitutional: Negative for activity change, appetite change, fatigue and fever. HENT: Negative for congestion and rhinorrhea. Eyes: Negative. Respiratory: Positive for cough, sputum production and wheezing. Negative for chest tightness. Cardiovascular: Negative. Gastrointestinal: Negative. Endocrine: Negative. Genitourinary: Negative. Musculoskeletal: Negative. Skin: Negative. Neurological: Negative for dizziness, light-headedness and numbness. Hematological: Negative. Psychiatric/Behavioral: Negative. Physical Exam  Vitals:    07/11/22 0917   BP: 130/76   Pulse: 71   Resp: 14   Temp: 97.7 °F (36.5 °C)   SpO2: 99%   Height: 5' 3\" (1.6 m)       Physical Exam  Constitutional:       Appearance: She is well-developed. HENT:      Right Ear: External ear normal.      Left Ear: External ear normal.   Eyes:      Pupils: Pupils are equal, round, and reactive to light. Neck:      Thyroid: No thyromegaly. Cardiovascular:      Rate and Rhythm: Normal rate and regular rhythm. Heart sounds: Normal heart sounds.  No murmur heard.  No friction rub. No gallop. Pulmonary:      Effort: Pulmonary effort is normal. No respiratory distress. Breath sounds: No wheezing or rales. Chest:      Chest wall: No tenderness. Abdominal:      General: Bowel sounds are normal. There is no distension. Palpations: Abdomen is soft. There is no mass. Tenderness: There is no abdominal tenderness. There is no guarding or rebound. Musculoskeletal:         General: Normal range of motion. Cervical back: Normal range of motion and neck supple. Lymphadenopathy:      Cervical: No cervical adenopathy. Skin:     General: Skin is warm and dry. Neurological:      Mental Status: She is alert and oriented to person, place, and time. Cranial Nerves: No cranial nerve deficit. Coordination: Coordination normal.         Assessment   Diagnosis Orders   1. Mild intermittent asthma with acute exacerbation     2. Acute bacterial sinusitis       Problem List     Asthma - Primary    Relevant Medications    albuterol sulfate  (90 Base) MCG/ACT inhaler          Plan  No orders of the defined types were placed in this encounter. Orders Placed This Encounter   Medications    cefdinir (OMNICEF) 300 MG capsule     Sig: Take 1 capsule by mouth 2 times daily for 10 days     Dispense:  20 capsule     Refill:  0    predniSONE (DELTASONE) 10 MG tablet     Si po for 4 days 3 po for 4 days 2 po for 4 days 1 po for 4 days     Dispense:  40 tablet     Refill:  0     No follow-ups on file.   Julia Oscar MD

## 2022-11-09 ENCOUNTER — TELEPHONE (OUTPATIENT)
Dept: GASTROENTEROLOGY | Age: 67
End: 2022-11-09

## 2022-11-09 DIAGNOSIS — Z78.0 MENOPAUSE: Primary | ICD-10-CM

## 2022-11-09 NOTE — TELEPHONE ENCOUNTER
Patient called asking if you can call her she is due for a colon but would like to ask you something before scheduling.  She can be reached at [phone]

## 2022-12-05 ENCOUNTER — HOSPITAL ENCOUNTER (OUTPATIENT)
Dept: WOMENS IMAGING | Age: 67
Discharge: HOME | End: 2022-12-07
Payer: MEDICARE

## 2022-12-05 DIAGNOSIS — Z12.31 ENCOUNTER FOR SCREENING MAMMOGRAM FOR MALIGNANT NEOPLASM OF BREAST: ICD-10-CM

## 2022-12-05 DIAGNOSIS — Z78.0 MENOPAUSE: ICD-10-CM

## 2022-12-05 PROCEDURE — 77067 SCR MAMMO BI INCL CAD: CPT

## 2022-12-05 PROCEDURE — 77080 DXA BONE DENSITY AXIAL: CPT

## 2022-12-06 ENCOUNTER — OFFICE VISIT (OUTPATIENT)
Dept: FAMILY MEDICINE CLINIC | Age: 67
End: 2022-12-06
Payer: MEDICARE

## 2022-12-06 VITALS
OXYGEN SATURATION: 98 % | HEART RATE: 84 BPM | BODY MASS INDEX: 25.69 KG/M2 | DIASTOLIC BLOOD PRESSURE: 70 MMHG | SYSTOLIC BLOOD PRESSURE: 128 MMHG | HEIGHT: 63 IN | TEMPERATURE: 96.9 F | WEIGHT: 145 LBS

## 2022-12-06 DIAGNOSIS — Z13.1 SCREENING FOR DIABETES MELLITUS: ICD-10-CM

## 2022-12-06 DIAGNOSIS — J45.20 MILD INTERMITTENT ASTHMA WITHOUT COMPLICATION: ICD-10-CM

## 2022-12-06 DIAGNOSIS — Z00.00 MEDICARE ANNUAL WELLNESS VISIT, SUBSEQUENT: Primary | ICD-10-CM

## 2022-12-06 DIAGNOSIS — E78.9 BORDERLINE HIGH SERUM CHOLESTEROL: ICD-10-CM

## 2022-12-06 DIAGNOSIS — E55.9 VITAMIN D DEFICIENCY: ICD-10-CM

## 2022-12-06 LAB
ALBUMIN SERPL-MCNC: 4.4 G/DL (ref 3.5–4.6)
ALP BLD-CCNC: 46 U/L (ref 40–130)
ALT SERPL-CCNC: 12 U/L (ref 0–33)
ANION GAP SERPL CALCULATED.3IONS-SCNC: 11 MEQ/L (ref 9–15)
AST SERPL-CCNC: 18 U/L (ref 0–35)
BASOPHILS ABSOLUTE: 0 K/UL (ref 0–0.2)
BASOPHILS RELATIVE PERCENT: 0.8 %
BILIRUB SERPL-MCNC: 0.4 MG/DL (ref 0.2–0.7)
BUN BLDV-MCNC: 10 MG/DL (ref 8–23)
CALCIUM SERPL-MCNC: 9.2 MG/DL (ref 8.5–9.9)
CHLORIDE BLD-SCNC: 99 MEQ/L (ref 95–107)
CHOLESTEROL, TOTAL: 246 MG/DL (ref 0–199)
CO2: 25 MEQ/L (ref 20–31)
CREAT SERPL-MCNC: 0.66 MG/DL (ref 0.5–0.9)
EOSINOPHILS ABSOLUTE: 0.1 K/UL (ref 0–0.7)
EOSINOPHILS RELATIVE PERCENT: 2.3 %
GFR SERPL CREATININE-BSD FRML MDRD: >60 ML/MIN/{1.73_M2}
GLOBULIN: 2.7 G/DL (ref 2.3–3.5)
GLUCOSE BLD-MCNC: 92 MG/DL (ref 70–99)
HCT VFR BLD CALC: 40.2 % (ref 37–47)
HDLC SERPL-MCNC: 94 MG/DL (ref 40–59)
HEMOGLOBIN: 13.3 G/DL (ref 12–16)
LDL CHOLESTEROL CALCULATED: 136 MG/DL (ref 0–129)
LYMPHOCYTES ABSOLUTE: 1.6 K/UL (ref 1–4.8)
LYMPHOCYTES RELATIVE PERCENT: 28.5 %
MCH RBC QN AUTO: 31.1 PG (ref 27–31.3)
MCHC RBC AUTO-ENTMCNC: 33.1 % (ref 33–37)
MCV RBC AUTO: 93.9 FL (ref 79.4–94.8)
MONOCYTES ABSOLUTE: 0.5 K/UL (ref 0.2–0.8)
MONOCYTES RELATIVE PERCENT: 8.9 %
NEUTROPHILS ABSOLUTE: 3.3 K/UL (ref 1.4–6.5)
NEUTROPHILS RELATIVE PERCENT: 59.5 %
PDW BLD-RTO: 13.8 % (ref 11.5–14.5)
PLATELET # BLD: 226 K/UL (ref 130–400)
POTASSIUM SERPL-SCNC: 4.5 MEQ/L (ref 3.4–4.9)
RBC # BLD: 4.28 M/UL (ref 4.2–5.4)
SODIUM BLD-SCNC: 135 MEQ/L (ref 135–144)
TOTAL PROTEIN: 7.1 G/DL (ref 6.3–8)
TRIGL SERPL-MCNC: 82 MG/DL (ref 0–150)
WBC # BLD: 5.5 K/UL (ref 4.8–10.8)

## 2022-12-06 PROCEDURE — 1123F ACP DISCUSS/DSCN MKR DOCD: CPT | Performed by: NURSE PRACTITIONER

## 2022-12-06 PROCEDURE — G0439 PPPS, SUBSEQ VISIT: HCPCS | Performed by: NURSE PRACTITIONER

## 2022-12-06 RX ORDER — FLUTICASONE PROPIONATE 50 MCG
SPRAY, SUSPENSION (ML) NASAL
Qty: 3 EACH | Refills: 3 | Status: SHIPPED | OUTPATIENT
Start: 2022-12-06

## 2022-12-06 RX ORDER — ALBUTEROL SULFATE 90 UG/1
2 AEROSOL, METERED RESPIRATORY (INHALATION) EVERY 6 HOURS PRN
Qty: 1 EACH | Refills: 3 | Status: SHIPPED | OUTPATIENT
Start: 2022-12-06

## 2022-12-06 SDOH — ECONOMIC STABILITY: FOOD INSECURITY: WITHIN THE PAST 12 MONTHS, YOU WORRIED THAT YOUR FOOD WOULD RUN OUT BEFORE YOU GOT MONEY TO BUY MORE.: NEVER TRUE

## 2022-12-06 SDOH — ECONOMIC STABILITY: FOOD INSECURITY: WITHIN THE PAST 12 MONTHS, THE FOOD YOU BOUGHT JUST DIDN'T LAST AND YOU DIDN'T HAVE MONEY TO GET MORE.: NEVER TRUE

## 2022-12-06 ASSESSMENT — PATIENT HEALTH QUESTIONNAIRE - PHQ9
SUM OF ALL RESPONSES TO PHQ QUESTIONS 1-9: 0
2. FEELING DOWN, DEPRESSED OR HOPELESS: 0
SUM OF ALL RESPONSES TO PHQ9 QUESTIONS 1 & 2: 0
SUM OF ALL RESPONSES TO PHQ QUESTIONS 1-9: 0
1. LITTLE INTEREST OR PLEASURE IN DOING THINGS: 0

## 2022-12-06 ASSESSMENT — LIFESTYLE VARIABLES
HOW OFTEN DO YOU HAVE A DRINK CONTAINING ALCOHOL: 2-3 TIMES A WEEK
HOW MANY STANDARD DRINKS CONTAINING ALCOHOL DO YOU HAVE ON A TYPICAL DAY: 1 OR 2

## 2022-12-06 ASSESSMENT — SOCIAL DETERMINANTS OF HEALTH (SDOH): HOW HARD IS IT FOR YOU TO PAY FOR THE VERY BASICS LIKE FOOD, HOUSING, MEDICAL CARE, AND HEATING?: NOT VERY HARD

## 2022-12-06 NOTE — PATIENT INSTRUCTIONS
Personalized Preventive Plan for Ana Sit - 12/6/2022  Medicare offers a range of preventive health benefits. Some of the tests and screenings are paid in full while other may be subject to a deductible, co-insurance, and/or copay. Some of these benefits include a comprehensive review of your medical history including lifestyle, illnesses that may run in your family, and various assessments and screenings as appropriate. After reviewing your medical record and screening and assessments performed today your provider may have ordered immunizations, labs, imaging, and/or referrals for you. A list of these orders (if applicable) as well as your Preventive Care list are included within your After Visit Summary for your review. Other Preventive Recommendations:    A preventive eye exam performed by an eye specialist is recommended every 1-2 years to screen for glaucoma; cataracts, macular degeneration, and other eye disorders. A preventive dental visit is recommended every 6 months. Try to get at least 150 minutes of exercise per week or 10,000 steps per day on a pedometer . Order or download the FREE \"Exercise & Physical Activity: Your Everyday Guide\" from [employer]. Call 1-991.193.4637 or search [employer] online. You need 1846-1753 mg of calcium and 8773-7430 IU of vitamin D per day. It is possible to meet your calcium requirement with diet alone, but a vitamin D supplement is usually necessary to meet this goal.  When exposed to the sun, use a sunscreen that protects against both UVA and UVB radiation with an SPF of 30 or greater. Reapply every 2 to 3 hours or after sweating, drying off with a towel, or swimming. Always wear a seat belt when traveling in a car. Always wear a helmet when riding a bicycle or motorcycle.

## 2022-12-06 NOTE — PROGRESS NOTES
Medicare Annual Wellness Visit    Mercedes Rodriguez is here for Medicare AWV    Assessment & Plan   Medicare annual wellness visit, subsequent  Mild intermittent asthma without complication  -     albuterol sulfate HFA (PROVENTIL;VENTOLIN;PROAIR) 108 (90 Base) MCG/ACT inhaler; Inhale 2 puffs into the lungs every 6 hours as needed for Wheezing, Disp-1 each, R-3Normal  Vitamin D deficiency  -     CBC with Auto Differential; Future  -     Vitamin D 25 Hydroxy; Future  Borderline high serum cholesterol  -     Lipid Panel; Future  Screening for diabetes mellitus  -     Comprehensive Metabolic Panel; Future    Recommendations for Preventive Services Due: see orders and patient instructions/AVS.  Recommended screening schedule for the next 5-10 years is provided to the patient in written form: see Patient Instructions/AVS.     Return for Medicare Annual Wellness Visit in 1 year. Subjective       Patient's complete Health Risk Assessment and screening values have been reviewed and are found in Flowsheets. The following problems were reviewed today and where indicated follow up appointments were made and/or referrals ordered.     Positive Risk Factor Screenings with Interventions:             General Health and ACP:  General  In general, how would you say your health is?: Very Good  In the past 7 days, have you experienced any of the following: New or Increased Pain, New or Increased Fatigue, Loneliness, Social Isolation, Stress or Anger?: No  Do you get the social and emotional support that you need?: Yes  Do you have a Living Will?: Yes    Advance Directives       Power of  Living Will ACP-Advance Directive ACP-Power of     Not on File Not on File Not on File Not on File        General Health Risk Interventions:  No Living Will: ACP documents already completed- patient asked to provide copy to the office              Objective   Vitals:    12/06/22 0830   BP: 128/70   Pulse: 84   Temp: 96.9 °F (36.1 °C) SpO2: 98%   Weight: 145 lb (65.8 kg)   Height: 5' 3\" (1.6 m)      Body mass index is 25.69 kg/m². General Appearance: alert and oriented to person, place and time, well developed and well- nourished, in no acute distress  Skin: warm and dry, no rash or erythema  Head: normocephalic and atraumatic  Eyes: pupils equal, round, and reactive to light, extraocular eye movements intact, conjunctivae normal  ENT: tympanic membrane, external ear and ear canal normal bilaterally, nose without deformity, nasal mucosa and turbinates normal without polyps  Neck: supple and non-tender without mass, no thyromegaly or thyroid nodules, no cervical lymphadenopathy  Pulmonary/Chest: clear to auscultation bilaterally- no wheezes, rales or rhonchi, normal air movement, no respiratory distress  Cardiovascular: normal rate, regular rhythm, normal S1 and S2, no murmurs,   Abdomen: soft, non-tender, non-distended, normal bowel sounds, no masses or organomegaly  Extremities: no cyanosis, clubbing or edema  Musculoskeletal: normal range of motion, no joint swelling, deformity or tenderness  Neurologic: reflexes normal and symmetric, no cranial nerve deficit, gait, coordination and speech normal       Allergies   Allergen Reactions    Sulfamethoxazole-Trimethoprim Itching and Rash     Prior to Visit Medications    Medication Sig Taking? Authorizing Provider   albuterol sulfate HFA (PROVENTIL;VENTOLIN;PROAIR) 108 (90 Base) MCG/ACT inhaler Inhale 2 puffs into the lungs every 6 hours as needed for Wheezing Yes Dorlene John Paul, APRN - CNP   fluticasone (FLONASE) 50 MCG/ACT nasal spray SPRAY 1 SPRAY INTO EACH NOSTRIL EVERY DAY Yes Dorlene John Paul, APRN - CNP   mupirocin (BACTROBAN) 2 % ointment Apply topically 3 times daily.  Yes Dorlene John Paul, APRN - CNP   estradiol [facility]) 0.1 MG/24HR Apply one patch twice weekly Yes DO Kathya Padilla (Including outside providers/suppliers regularly involved in providing care):   Patient Care Team:  Dillon Johnson MD as PCP - General (Family Medicine)  Dillon Johnson MD as PCP - [facility] Empaneled Provider  Gertrudis Krishnan MD (Gastroenterology)     Reviewed and updated this visit:  Tobacco  Allergies  Meds  Med Hx  Surg Hx  Soc Hx  Fam Hx

## 2022-12-07 LAB — VITAMIN D 25-HYDROXY: 33.5 NG/ML

## 2022-12-22 ENCOUNTER — HOSPITAL ENCOUNTER (OUTPATIENT)
Age: 67
Setting detail: OUTPATIENT SURGERY
Discharge: HOME | End: 2022-12-22
Attending: SPECIALIST | Admitting: SPECIALIST
Payer: MEDICARE

## 2022-12-22 ENCOUNTER — ANESTHESIA (OUTPATIENT)
Dept: ENDOSCOPY | Age: 67
End: 2022-12-22
Payer: MEDICARE

## 2022-12-22 ENCOUNTER — ANESTHESIA EVENT (OUTPATIENT)
Dept: ENDOSCOPY | Age: 67
End: 2022-12-22
Payer: MEDICARE

## 2022-12-22 VITALS
HEIGHT: 63 IN | WEIGHT: 138 LBS | BODY MASS INDEX: 24.45 KG/M2 | SYSTOLIC BLOOD PRESSURE: 125 MMHG | TEMPERATURE: 97.9 F | RESPIRATION RATE: 16 BRPM | HEART RATE: 63 BPM | OXYGEN SATURATION: 99 % | DIASTOLIC BLOOD PRESSURE: 72 MMHG

## 2022-12-22 PROBLEM — Z86.0101 H/O ADENOMATOUS POLYP OF COLON: Status: ACTIVE | Noted: 2022-12-22

## 2022-12-22 PROBLEM — Z86.010 H/O ADENOMATOUS POLYP OF COLON: Status: ACTIVE | Noted: 2022-12-22

## 2022-12-22 PROCEDURE — 2580000003 HC RX 258

## 2022-12-22 PROCEDURE — 3700000000 HC ANESTHESIA ATTENDED CARE: Performed by: SPECIALIST

## 2022-12-22 PROCEDURE — 2580000003 HC RX 258: Performed by: SPECIALIST

## 2022-12-22 PROCEDURE — 3700000001 HC ADD 15 MINUTES (ANESTHESIA): Performed by: SPECIALIST

## 2022-12-22 PROCEDURE — 2709999900 HC NON-CHARGEABLE SUPPLY: Performed by: SPECIALIST

## 2022-12-22 PROCEDURE — 3609027000 HC COLONOSCOPY: Performed by: SPECIALIST

## 2022-12-22 PROCEDURE — 2500000003 HC RX 250 WO HCPCS: Performed by: NURSE ANESTHETIST, CERTIFIED REGISTERED

## 2022-12-22 PROCEDURE — 7100000010 HC PHASE II RECOVERY - FIRST 15 MIN: Performed by: SPECIALIST

## 2022-12-22 PROCEDURE — 6370000000 HC RX 637 (ALT 250 FOR IP): Performed by: SPECIALIST

## 2022-12-22 PROCEDURE — 6360000002 HC RX W HCPCS: Performed by: NURSE ANESTHETIST, CERTIFIED REGISTERED

## 2022-12-22 RX ORDER — LIDOCAINE HYDROCHLORIDE 20 MG/ML
INJECTION, SOLUTION INFILTRATION; PERINEURAL PRN
Status: DISCONTINUED | OUTPATIENT
Start: 2022-12-22 | End: 2022-12-22 | Stop reason: SDUPTHER

## 2022-12-22 RX ORDER — SODIUM CHLORIDE 0.9 % (FLUSH) 0.9 %
5-40 SYRINGE (ML) INJECTION PRN
Status: CANCELLED | OUTPATIENT
Start: 2022-12-22

## 2022-12-22 RX ORDER — SODIUM CHLORIDE 9 MG/ML
INJECTION, SOLUTION INTRAVENOUS PRN
Status: CANCELLED | OUTPATIENT
Start: 2022-12-22

## 2022-12-22 RX ORDER — MAGNESIUM HYDROXIDE 1200 MG/15ML
LIQUID ORAL PRN
Status: DISCONTINUED | OUTPATIENT
Start: 2022-12-22 | End: 2022-12-22 | Stop reason: ALTCHOICE

## 2022-12-22 RX ORDER — SODIUM CHLORIDE 9 MG/ML
INJECTION, SOLUTION INTRAVENOUS
Status: COMPLETED
Start: 2022-12-22 | End: 2022-12-22

## 2022-12-22 RX ORDER — SODIUM CHLORIDE 0.9 % (FLUSH) 0.9 %
5-40 SYRINGE (ML) INJECTION EVERY 12 HOURS SCHEDULED
Status: CANCELLED | OUTPATIENT
Start: 2022-12-22

## 2022-12-22 RX ORDER — SIMETHICONE 20 MG/.3ML
EMULSION ORAL PRN
Status: DISCONTINUED | OUTPATIENT
Start: 2022-12-22 | End: 2022-12-22 | Stop reason: ALTCHOICE

## 2022-12-22 RX ORDER — PROPOFOL 10 MG/ML
INJECTION, EMULSION INTRAVENOUS PRN
Status: DISCONTINUED | OUTPATIENT
Start: 2022-12-22 | End: 2022-12-22 | Stop reason: SDUPTHER

## 2022-12-22 RX ORDER — SODIUM CHLORIDE 9 MG/ML
INJECTION, SOLUTION INTRAVENOUS CONTINUOUS
Status: DISCONTINUED | OUTPATIENT
Start: 2022-12-22 | End: 2022-12-22 | Stop reason: HOSPADM

## 2022-12-22 RX ADMIN — SODIUM CHLORIDE: 9 INJECTION, SOLUTION INTRAVENOUS at 11:43

## 2022-12-22 RX ADMIN — PROPOFOL 340 MG: 10 INJECTION, EMULSION INTRAVENOUS at 13:06

## 2022-12-22 RX ADMIN — LIDOCAINE HYDROCHLORIDE 60 MG: 20 INJECTION, SOLUTION INFILTRATION; PERINEURAL at 13:06

## 2022-12-22 ASSESSMENT — PAIN - FUNCTIONAL ASSESSMENT: PAIN_FUNCTIONAL_ASSESSMENT: 0-10

## 2022-12-22 NOTE — ANESTHESIA PRE PROCEDURE
Department of Anesthesiology  Preprocedure Note       Name:  Denis Rosado   Age:  78 y.o.  :  1955                                          MRN:  [id number]         Date:  2022      Surgeon: Africa Welch):  Paras Bone MD    Procedure: Procedure(s):  COLORECTAL CANCER SCREENING, HIGH RISK    Medications prior to admission:   Prior to Admission medications    Medication Sig Start Date End Date Taking? Authorizing Provider   albuterol sulfate HFA (PROVENTIL;VENTOLIN;PROAIR) 108 (90 Base) MCG/ACT inhaler Inhale 2 puffs into the lungs every 6 hours as needed for Wheezing 22   Tracy Hawley APRN - CNP   fluticasone [facility]) 50 MCG/ACT nasal spray SPRAY 1 SPRAY INTO EACH NOSTRIL EVERY DAY 22   PAUL Tyson - CNP   estradiol [facility]) 0.1 MG/24HR Apply one patch twice weekly 21   Claudette Givens, DO       Current medications:    No current facility-administered medications for this encounter. Current Outpatient Medications   Medication Sig Dispense Refill    albuterol sulfate HFA (PROVENTIL;VENTOLIN;PROAIR) 108 (90 Base) MCG/ACT inhaler Inhale 2 puffs into the lungs every 6 hours as needed for Wheezing 1 each 3    fluticasone (FLONASE) 50 MCG/ACT nasal spray SPRAY 1 SPRAY INTO EACH NOSTRIL EVERY DAY 3 each 3    estradiol (CLIMARA) 0.1 MG/24HR Apply one patch twice weekly 24 patch 3       Allergies:     Allergies   Allergen Reactions    Sulfamethoxazole-Trimethoprim Itching and Rash       Problem List:    Patient Active Problem List   Diagnosis Code    Osteopenia M85.80    Asthma J45.909    Gastroesophageal reflux disease K21.9    Pain in lower limb M79.606    Synovitis and tenosynovitis M65.9    Vaginal enterocele N81.5    Stress incontinence N39.3       Past Medical History:        Diagnosis Date    Asthma     GERD (gastroesophageal reflux disease)     Osteopenia        Past Surgical History:        Procedure Laterality Date    APPENDECTOMY      BLADDER SUSPENSION N/A 12/27/2021    TRANSVAGINAL TENSION FREE VAGINAL TAPE- OBTURATOR CYSTOSCOPY, STICH REMOVAL RECTOCELE [facility] [address] (CERVIX STATUS UNKNOWN)  2004    total    KNEE ARTHROSCOPY  age 22    LAPAROSCOPIC APPENDECTOMY N/A 2/4/2020    APPENDECTOMY LAPAROSCOPIC performed by Arsenio Lau MD at [address] 10/3/2018    LAPAROSCOPY EXPLORATORY performed by Arsenio Lau MD at [address] POLYPECTOMY  2010    colon, stomach    [place] RECTUM SURGERY PROCEDURE UNLISTED N/A 10/3/2018    TRANSVAGINAL RECTOCELE REPAIR and Diagnostic Laproscopy [facility] at [facility]    UPPER GASTROINTESTINAL ENDOSCOPY  11/5/15     DR. LOVING     UPPER GASTROINTESTINAL ENDOSCOPY N/A 2/27/2020    EGD ESOPHAGOGASTRODUODENOSCOPY performed by Madonna Caceres MD at [address] Right 3/3/2021    RIGHT WRIST [address] performed by Diogenes Saenz DO at [facility] History:    Social History     Tobacco Use    Smoking status: Never    Smokeless tobacco: Never   Substance Use Topics    Alcohol use: Yes     Comment: socially                                Counseling given: Not Answered      Vital Signs (Current): There were no vitals filed for this visit.                                            BP Readings from Last 3 Encounters:   12/06/22 128/70   07/11/22 130/76   01/11/22 120/68       NPO Status:                                                                                 BMI:   Wt Readings from Last 3 Encounters:   12/06/22 145 lb (65.8 kg)   01/11/22 146 lb (66.2 kg)   12/27/21 146 lb (66.2 kg)     There is no height or weight on file to calculate BMI.    CBC:   Lab Results   Component Value Date/Time    WBC 5.5 12/06/2022 09:10 AM    RBC 4.28 12/06/2022 09:10 AM    HGB 13.3 12/06/2022 09:10 AM    HCT 40.2 12/06/2022 09:10 AM MCV 93.9 12/06/2022 09:10 AM    RDW 13.8 12/06/2022 09:10 AM     12/06/2022 09:10 AM       CMP:   Lab Results   Component Value Date/Time     12/06/2022 09:10 AM    K 4.5 12/06/2022 09:10 AM    CL 99 12/06/2022 09:10 AM    CO2 25 12/06/2022 09:10 AM    BUN 10 12/06/2022 09:10 AM    CREATININE 0.66 12/06/2022 09:10 AM    GFRAA >60.0 12/03/2021 10:46 AM    LABGLOM >60.0 12/06/2022 09:10 AM    GLUCOSE 92 12/06/2022 09:10 AM    PROT 7.1 12/06/2022 09:10 AM    CALCIUM 9.2 12/06/2022 09:10 AM    BILITOT 0.4 12/06/2022 09:10 AM    ALKPHOS 46 12/06/2022 09:10 AM    AST 18 12/06/2022 09:10 AM    ALT 12 12/06/2022 09:10 AM       POC Tests: No results for input(s): POCGLU, POCNA, POCK, POCCL, POCBUN, POCHEMO, POCHCT in the last 72 hours. Coags: No results found for: PROTIME, INR, APTT    HCG (If Applicable): No results found for: PREGTESTUR, PREGSERUM, HCG, HCGQUANT     ABGs: No results found for: PHART, PO2ART, KLY2KDC, CWK9TMZ, BEART, Y3ANFDPL     Type & Screen (If Applicable):  No results found for: LABABO, LABRH    Drug/Infectious Status (If Applicable):  No results found for: HIV, HEPCAB    COVID-19 Screening (If Applicable):   Lab Results   Component Value Date/Time    COVID19 Not Detected 12/27/2021 07:58 AM    COVID19 Not Detected 03/01/2021 01:57 PM           Anesthesia Evaluation  Patient summary reviewed and Nursing notes reviewed  Airway: Mallampati: II  TM distance: >3 FB   Neck ROM: full  Mouth opening: > = 3 FB   Dental:          Pulmonary:normal exam  breath sounds clear to auscultation  (+) asthma:                            Cardiovascular:Negative CV ROS          ECG reviewed  Rhythm: regular  Rate: normal                    Neuro/Psych:   Negative Neuro/Psych ROS              GI/Hepatic/Renal:   (+) GERD:, bowel prep,           Endo/Other: Negative Endo/Other ROS                    Abdominal:             Vascular: negative vascular ROS.          Other Findings:           Anesthesia Plan      MAC     ASA 2       Induction: intravenous. Anesthetic plan and risks discussed with patient. Plan discussed with attending.                     PAUL Pandya - CRNA   12/22/2022

## 2022-12-22 NOTE — ANESTHESIA POSTPROCEDURE EVALUATION
Department of Anesthesiology  Postprocedure Note    Patient: Lucrecia Arevalo  MRN: [id number]  YOB: 1955  Date of evaluation: 12/22/2022      Procedure Summary     Date: 12/22/22 Room / [facility]: [facility] OR 01 / [facility]    Anesthesia Start: 5015 Anesthesia Stop: 5279    Procedure: COLORECTAL CANCER SCREENING, HIGH RISK Diagnosis:       History of colon polyps      (History of colon polyps [Z86.010])    Surgeons: Maddy Mcgee MD Responsible Provider: PAUL Contreras CRNA    Anesthesia Type: MAC ASA Status: 2          Anesthesia Type: No value filed.     Estefany Phase I: Estefany Score: 10    Estefany Phase II:        Anesthesia Post Evaluation    Patient location during evaluation: PACU  Patient participation: complete - patient participated  Level of consciousness: sleepy but conscious  Pain score: 0  Airway patency: patent  Nausea & Vomiting: no nausea and no vomiting  Complications: no  Cardiovascular status: hemodynamically stable  Respiratory status: acceptable  Hydration status: euvolemic

## 2022-12-22 NOTE — DISCHARGE INSTRUCTIONS
Lower Discharge Instructions    Patient Name: Juan Carlos Tee  Patient ID: [id number]  YOB: 1955  Procedure: Mary Joshi  Referring Physician: [unfilled]  Procedure Date: 12/22/2022    Recommendations:  []   Follow-up appointment with family physician in 4 weeks. [x]   Colonoscopy recommended in 5  years. []   Follow-up appointment with endoscopist in  Reports of your procedure and these recommendations have been sent to:  [unfilled]    Sedative medication given for procedures can slow your reaction time and coordination for many hours. If you receive medications, it is important for your safety to follow the instructions below for the remainder of the day:  BE TAKEN directly home from the center and rest quietly. DO NOT resume normal activities until tomorrow. Do NOT drive, return to work, or operate any machinery or power tools. Do NOT make any important personal or business decisions, sign any legal papers or perform any activity that depends on your full concentration power or mental judgement. Do NOT drink any alcohol or take nerve or sleeping drugs. They add to the effects of the medicine still present in your body.    [] (Checked if a biopsy or polyp removal was performed)  There is a slight risk of bleeding. If you had a biopsy or a polyp removed, we suggest that you follow the instructions below:  Do NOT take aspirin or similar anti-inflammatory medicine for ___ days. Do NOT exercise, jog, or do any heavy lifting or straining for 1 day. Potential common after effects and treatment following the procedure:  Mild abdominal pain, bloating, or excessive gas - rest, eat lightly, and use a heating pad. Redness and/or swelling where the IV was - apply heat and elevate. Symptoms to report to your physician:  Severe abdominal pain or bloating. Chills or fever above 101 degrees occurring within 24 hours after procedure. Large amounts of rectal bleeding that does not stop.  Small amount of rectal bleeding is not serious especially if hemorrhoids are present. Unable to keep down food or drink. IV site stays red and swollen for more than 2 days. In the case of an emergency, please go to the emergency room. If you are not having an emergency but are having some of the above symptoms please call the doctor's office at:  Dr. Lu Potter [phone]   @[email or web address]@      I have read and understand the above instructions:            ____________________________         ____________________________  Patient or Patient Rep. Signature   Witness Signature      Date: 12/22/2022  Time:

## 2022-12-22 NOTE — H&P
Patient Name: Rosales Lopez  : 1955  MRN: [id number]  DATE: 22      ENDOSCOPY  History and Physical    Procedure:    [x] Diagnostic Colonoscopy       [] Screening Colonoscopy  [] EGD      [] ERCP      [] EUS       [] Other    [x] Previous office notes/History and Physical reviewed from the patients chart. Please see EMR for further details of HPI. I have examined the patient's status immediately prior to the procedure and:      Indications/HPI:    []Abdominal Pain  []Cancer- GI/Lung  []Fhx of colon CA/polyps  []History of Polyps  []Zamoras   []Melena  []Abnormal Imaging  []Dysphagia    []Persistent Pneumonia  []Anemia  []Food Impaction  [x]History of Polyps  []GI Bleed  []Pulmonary nodule/Mass  []Change in bowel habits []Heartburn/Reflux  []Rectal Bleed (BRBPR)  []Chest Pain - Non Cardiac []Heme (+) Stoo  l[]Ulcers  []Constipation  []Hemoptysis   []Varices  []Diarrhea  []Hypoxemia  []Nausea/Vomiting  []Screening   []Crohns/Colitis  []Other:     Anesthesia:   [x] MAC [] Moderate Sedation   [] General   [] None     ROS: 12 pt Review of Symptoms was negative unless mentioned above    Medications:   Prior to Admission medications    Medication Sig Start Date End Date Taking? Authorizing Provider   Calcium 200 MG TABS Take 600 mg by mouth   Yes Historical Provider, MD   albuterol sulfate HFA (PROVENTIL;VENTOLIN;PROAIR) 108 (90 Base) MCG/ACT inhaler Inhale 2 puffs into the lungs every 6 hours as needed for Wheezing 22   PAUL Cartagena CNP   fluticasone Elenor Senna) 50 MCG/ACT nasal spray SPRAY 1 SPRAY INTO EACH NOSTRIL EVERY DAY 22   PAUL Cartagena CNP   estradiol [facility]) 0.1 MG/24HR Apply one patch twice weekly 21   Lexis Morrison DO       Allergies:    Allergies   Allergen Reactions    Sulfamethoxazole-Trimethoprim Itching and Rash        History of allergic reaction to anesthesia:  No    Past Medical History:  Past Medical History:   Diagnosis Date    Asthma     GERD (gastroesophageal reflux disease)     Osteopenia        Past Surgical History:  Past Surgical History:   Procedure Laterality Date    APPENDECTOMY      BLADDER SUSPENSION N/A 12/27/2021    TRANSVAGINAL TENSION FREE VAGINAL TAPE- OBTURATOR CYSTOSCOPY, STICH REMOVAL RECTOCELE performed by Ilda Valdez DO at [place] [address] ([facility])  2004    total    KNEE ARTHROSCOPY  age 22    [facility] N/A 2/4/2020    APPENDECTOMY LAPAROSCOPIC performed by Veronica Zhou MD at [address] N/A 10/3/2018    LAPAROSCOPY EXPLORATORY performed by Veronica Zhou MD at [facility]  2010    colon, stomach    [place] RECTUM SURGERY PROCEDURE UNLISTED N/A 10/3/2018    TRANSVAGINAL RECTOCELE REPAIR and Diagnostic Laproscopy performed by Veronica Zhou MD at [place] [address]      child    UPPER GASTROINTESTINAL ENDOSCOPY  11/5/15      [address] [place] [address]     UPPER GASTROINTESTINAL ENDOSCOPY N/A 2/27/2020    EGD ESOPHAGOGASTRODUODENOSCOPY performed by Marsha Blanchard MD at [address] Right 3/3/2021    RIGHT WRIST [address] performed by Husam Estrada DO at [facility] History:  Social History     Tobacco Use    Smoking status: Never    Smokeless tobacco: Never   Vaping Use    Vaping Use: Never used   Substance Use Topics    Alcohol use: Yes     Comment: socially    Drug use: No       Vital Signs:   Vitals:    12/22/22 1136   BP: (!) 163/69   Pulse: 69   Resp: 18   Temp: 97.9 °F (36.6 °C)   SpO2: 98%        Physical Exam:  Cardiac:  [x]WNL  []Comments:  Pulmonary:  [x]WNL   []Comments:   Neuro/Mental Status:  [x]WNL  []Comments:  Abdominal:  [x]WNL    []Comments:  Other:   []WNL  []Comments:    Informed Consent:  The risks and benefits of the procedure have been discussed with either the patient or if they cannot consent, their representative.     Assessment:  Patient examined and appropriate for planned sedation and procedure. Plan:  Proceed with planned sedation and procedure as above.     Palma Villasenor MD  1:01 PM

## 2023-01-07 ENCOUNTER — OFFICE VISIT (OUTPATIENT)
Dept: FAMILY MEDICINE CLINIC | Age: 68
End: 2023-01-07
Payer: MEDICARE

## 2023-01-07 VITALS
HEIGHT: 63 IN | HEART RATE: 60 BPM | OXYGEN SATURATION: 98 % | WEIGHT: 139 LBS | DIASTOLIC BLOOD PRESSURE: 60 MMHG | TEMPERATURE: 97 F | SYSTOLIC BLOOD PRESSURE: 128 MMHG | BODY MASS INDEX: 24.63 KG/M2

## 2023-01-07 DIAGNOSIS — B96.89 ACUTE BACTERIAL SINUSITIS: Primary | ICD-10-CM

## 2023-01-07 DIAGNOSIS — J01.90 ACUTE BACTERIAL SINUSITIS: Primary | ICD-10-CM

## 2023-01-07 LAB
INFLUENZA A ANTIBODY: NORMAL
INFLUENZA B ANTIBODY: NORMAL
Lab: NORMAL
PERFORMING INSTRUMENT: NORMAL
QC PASS/FAIL: NORMAL
SARS-COV-2, POC: NORMAL

## 2023-01-07 PROCEDURE — 1123F ACP DISCUSS/DSCN MKR DOCD: CPT | Performed by: NURSE PRACTITIONER

## 2023-01-07 PROCEDURE — 87426 SARSCOV CORONAVIRUS AG IA: CPT | Performed by: NURSE PRACTITIONER

## 2023-01-07 PROCEDURE — 99213 OFFICE O/P EST LOW 20 MIN: CPT | Performed by: NURSE PRACTITIONER

## 2023-01-07 PROCEDURE — 87804 INFLUENZA ASSAY W/OPTIC: CPT | Performed by: NURSE PRACTITIONER

## 2023-01-07 RX ORDER — AMOXICILLIN AND CLAVULANATE POTASSIUM 875; 125 MG/1; MG/1
1 TABLET, FILM COATED ORAL 2 TIMES DAILY
Qty: 20 TABLET | Refills: 0 | Status: SHIPPED | OUTPATIENT
Start: 2023-01-07 | End: 2023-01-17

## 2023-01-07 ASSESSMENT — PATIENT HEALTH QUESTIONNAIRE - PHQ9
SUM OF ALL RESPONSES TO PHQ QUESTIONS 1-9: 0
SUM OF ALL RESPONSES TO PHQ9 QUESTIONS 1 & 2: 0
SUM OF ALL RESPONSES TO PHQ QUESTIONS 1-9: 0
2. FEELING DOWN, DEPRESSED OR HOPELESS: 0
SUM OF ALL RESPONSES TO PHQ QUESTIONS 1-9: 0
1. LITTLE INTEREST OR PLEASURE IN DOING THINGS: 0
SUM OF ALL RESPONSES TO PHQ QUESTIONS 1-9: 0

## 2023-01-07 NOTE — PROGRESS NOTES
[facility] Encounter  CHIEF COMPLAINT       Chief Complaint   Patient presents with    Sinus Problem     Mucus drainage with small blood clots x2 weeks. HISTORY OF PRESENT ILLNESS   Berhane Tian is a 78 y.o. female who presents with:  HPI  Patient presents to the clinic with plaints of cough, sinus pressure, postnasal drip, sore throat, headache and mucopurulent drainage for the past 2 weeks. She has experienced normal epistaxis the last couple days. Resolve spontaneously. He has been taking decongestant with Mucinex along with Flonase. Nothing has helped her symptoms. She denies fever, but is occasionally chilled. Some slight malaise. Tolerating p.o. with no nausea or vomiting. No diarrhea. No melena or hematochezia. No abdominal pain. REVIEW OF SYSTEMS     Review of Systems   All other systems reviewed and are negative. PAST MEDICAL HISTORY         Diagnosis Date    Asthma     GERD (gastroesophageal reflux disease)     Osteopenia      SURGICAL HISTORY     Patient  has a past surgical history that includes polypectomy (2010); Tonsillectomy; Knee arthroscopy (age 22); Upper gastrointestinal endoscopy (11/5/15 ); pr unlisted procedure rectum (N/A, 10/3/2018); laparoscopy (N/A, 10/3/2018); Colonoscopy; Hysterectomy (2004); laparoscopic appendectomy (N/A, 2/4/2020); Appendectomy; Upper gastrointestinal endoscopy (N/A, 2/27/2020); Wrist surgery (Right, 3/3/2021); Ovary removal; bladder suspension (N/A, 12/27/2021); and Colonoscopy (N/A, 12/22/2022).   CURRENT MEDICATIONS       Previous Medications    ALBUTEROL SULFATE HFA (PROVENTIL;VENTOLIN;PROAIR) 108 (90 BASE) MCG/ACT INHALER    Inhale 2 puffs into the lungs every 6 hours as needed for Wheezing    CALCIUM 200 MG TABS    Take 600 mg by mouth    ESTRADIOL (CLIMARA) 0.1 MG/24HR    Apply one patch twice weekly    FEXOFENADINE HCL (MUCINEX ALLERGY PO)    Take by mouth    FLUTICASONE (FLONASE) 50 MCG/ACT NASAL SPRAY    SPRAY 1 SPRAY INTO EACH NOSTRIL EVERY DAY    PSEUDOEPHEDRINE-APAP-DM (DAYTIME COLD/FLU PO)    Take by mouth     ALLERGIES     Patient is is allergic to sulfamethoxazole-trimethoprim. FAMILY HISTORY     Patient'sfamily history includes Arthritis in her father and mother; Asthma in her mother; Breast Cancer in her half-sibling; Cancer in her father; Heart Disease in her father; High Blood Pressure in her father; High Cholesterol in her father; Kidney Disease in her mother; Other in her father; Stroke in her father. HISTORY     Patient  reports that she has never smoked. She has never been exposed to tobacco smoke. She has never used smokeless tobacco. She reports current alcohol use. She reports that she does not use drugs. PHYSICAL EXAM     VITALS  BP: 128/60, Temp: 97 °F (36.1 °C), Heart Rate: 60,  , SpO2: 98 %  Physical Exam  Vitals reviewed. Constitutional:       General: She is not in acute distress. Appearance: Normal appearance. She is well-developed and normal weight. She is ill-appearing. She is not toxic-appearing or diaphoretic. HENT:      Head: Normocephalic and atraumatic. Right Ear: Hearing, ear canal and external ear normal. Swelling present. No tenderness. Tympanic membrane is not perforated, erythematous or bulging. Tympanic membrane has normal mobility. Left Ear: Hearing, ear canal and external ear normal. Swelling present. No tenderness. Tympanic membrane is not perforated, erythematous or bulging. Tympanic membrane has normal mobility. Nose: Congestion present. Right Nostril: No epistaxis. Left Nostril: No epistaxis. Right Turbinates: Swollen. Not enlarged. Left Turbinates: Swollen. Not enlarged. Right Sinus: Maxillary sinus tenderness present. Left Sinus: Maxillary sinus tenderness present. Mouth/Throat:      Lips: Pink. Mouth: Mucous membranes are moist.      Pharynx: Oropharynx is clear. Uvula midline.  Posterior oropharyngeal erythema present. No pharyngeal swelling, oropharyngeal exudate or uvula swelling. Tonsils: No tonsillar exudate or tonsillar abscesses. Eyes:      General: No scleral icterus. Right eye: No discharge. Left eye: No discharge. Extraocular Movements: Extraocular movements intact. Conjunctiva/sclera: Conjunctivae normal.      Pupils: Pupils are equal, round, and reactive to light. Cardiovascular:      Rate and Rhythm: Normal rate and regular rhythm. Heart sounds: Normal heart sounds. No murmur heard. Pulmonary:      Effort: Pulmonary effort is normal. No respiratory distress. Breath sounds: Normal breath sounds. No stridor. No wheezing, rhonchi or rales. Chest:      Chest wall: No tenderness. Abdominal:      General: Bowel sounds are normal. There is no distension. Palpations: Abdomen is soft. There is no mass. Tenderness: There is no abdominal tenderness. There is no guarding or rebound. Musculoskeletal:         General: Normal range of motion. Cervical back: Normal range of motion and neck supple. No tenderness. Right lower leg: No edema. Left lower leg: No edema. Lymphadenopathy:      Cervical: No cervical adenopathy. Skin:     General: Skin is warm and dry. Coloration: Skin is not jaundiced or pale. Findings: No bruising, erythema or rash. Neurological:      General: No focal deficit present. Mental Status: She is alert and oriented to person, place, and time. Psychiatric:         Mood and Affect: Mood normal.         Behavior: Behavior normal.         Thought Content: Thought content normal.         Judgment: Judgment normal.     READY CARE COURSE   Labs:  No results found for this visit on 01/07/23. IMAGING:  No orders to display     Scheduled Meds:  Continuous Infusions:  PRN Meds:. PROCEDURES:  FINAL IMPRESSION      1.  Acute bacterial sinusitis      DISPOSITION/PLAN   Patient with symptoms for the past 2 weeks, worsening. She has tried OTC cold and sinus medication with suboptimal response. She is nontoxic and in NAD. -Start Augmentin 875/125 mg p.o. twice daily for the next 10 days.  -Supportive care, examples reviewed with the patient. Return if symptoms worsen or fail to improve by tomorrow, for follow-up with PCP. Side effects and adverse effects of any medication prescribed today, as well as treatment plan/rationale, follow-up care, and result expectations have been discussed with the patient. Expresses understanding and desires to proceed with treatment plan. Discussed signs and symptoms which require immediate follow-up in ED/call to 911. Understanding verbalized. I have reviewed and updated the electronic medical record. PATIENT REFERRED TO:  Return if symptoms worsen or fail to improve. DISCHARGE MEDICATIONS:  New Prescriptions    No medications on file     Cannot display discharge medications since this is not an admission.        Pardeep Chand, PAUL - CNP

## 2023-08-22 ENCOUNTER — OFFICE VISIT (OUTPATIENT)
Dept: FAMILY MEDICINE CLINIC | Age: 68
End: 2023-08-22
Payer: MEDICARE

## 2023-08-22 VITALS
HEIGHT: 63 IN | DIASTOLIC BLOOD PRESSURE: 72 MMHG | TEMPERATURE: 97.4 F | WEIGHT: 143.4 LBS | SYSTOLIC BLOOD PRESSURE: 124 MMHG | HEART RATE: 72 BPM | BODY MASS INDEX: 25.41 KG/M2 | OXYGEN SATURATION: 98 %

## 2023-08-22 DIAGNOSIS — J01.90 ACUTE BACTERIAL SINUSITIS: Primary | ICD-10-CM

## 2023-08-22 DIAGNOSIS — B96.89 ACUTE BACTERIAL SINUSITIS: Primary | ICD-10-CM

## 2023-08-22 PROCEDURE — 99213 OFFICE O/P EST LOW 20 MIN: CPT

## 2023-08-22 PROCEDURE — 1123F ACP DISCUSS/DSCN MKR DOCD: CPT

## 2023-08-22 RX ORDER — AMOXICILLIN AND CLAVULANATE POTASSIUM 875; 125 MG/1; MG/1
1 TABLET, FILM COATED ORAL 2 TIMES DAILY
Qty: 20 TABLET | Refills: 0 | Status: SHIPPED | OUTPATIENT
Start: 2023-08-22 | End: 2023-09-01

## 2023-08-22 SDOH — ECONOMIC STABILITY: FOOD INSECURITY: WITHIN THE PAST 12 MONTHS, YOU WORRIED THAT YOUR FOOD WOULD RUN OUT BEFORE YOU GOT MONEY TO BUY MORE.: NEVER TRUE

## 2023-08-22 SDOH — ECONOMIC STABILITY: INCOME INSECURITY: HOW HARD IS IT FOR YOU TO PAY FOR THE VERY BASICS LIKE FOOD, HOUSING, MEDICAL CARE, AND HEATING?: NOT HARD AT ALL

## 2023-08-22 SDOH — ECONOMIC STABILITY: FOOD INSECURITY: WITHIN THE PAST 12 MONTHS, THE FOOD YOU BOUGHT JUST DIDN'T LAST AND YOU DIDN'T HAVE MONEY TO GET MORE.: NEVER TRUE

## 2023-08-22 SDOH — ECONOMIC STABILITY: HOUSING INSECURITY
IN THE LAST 12 MONTHS, WAS THERE A TIME WHEN YOU DID NOT HAVE A STEADY PLACE TO SLEEP OR SLEPT IN A SHELTER (INCLUDING NOW)?: NO

## 2023-08-22 ASSESSMENT — ENCOUNTER SYMPTOMS
VOMITING: 0
RHINORRHEA: 0
EYES NEGATIVE: 1
SINUS PAIN: 1
SHORTNESS OF BREATH: 0
COUGH: 0
CHEST TIGHTNESS: 0
DIARRHEA: 0
ABDOMINAL PAIN: 0
WHEEZING: 0
SINUS PRESSURE: 1
SORE THROAT: 0

## 2023-08-22 NOTE — PROGRESS NOTES
[address] [facility] PRIMARY CARE          ASSESSMENT/PLAN     Lord Morgan is a 80 y.o. female who presents with:  Reporting sinus congestion pain to the right side of her face behind the eyes and in the forehead symptoms first began 2 weeks ago and are gradually worsening. She has tried taking over-the-counter decongestant she is on Flonase daily. Reports symptoms began after she went swimming. Ears are normal bilaterally pharynx pink and moist no tonsillar enlargement. Neck is supple without masses and lung sounds are clear throughout. 1. Acute bacterial sinusitis  -     amoxicillin-clavulanate (AUGMENTIN) 875-125 MG per tablet; Take 1 tablet by mouth 2 times daily for 10 days, Disp-20 tablet, R-0Normal           PATIENT REFERRED TO:  Return if symptoms worsen or fail to improve. DISCHARGE MEDICATIONS:  New Prescriptions    AMOXICILLIN-CLAVULANATE (AUGMENTIN) 875-125 MG PER TABLET    Take 1 tablet by mouth 2 times daily for 10 days     Cannot display discharge medications since this is not an admission. Gordon Nieto APRN - CNP    CHIEF COMPLAINT       Chief Complaint   Patient presents with    Sinus Problem     Sinus drainage, colored mucous, headache x2 weeks         SUBJECTIVE/REVIEW OF SYSTEMS     Review of Systems   Constitutional:  Negative for chills, fatigue and fever. HENT:  Positive for congestion, sinus pressure and sinus pain. Negative for ear pain, rhinorrhea and sore throat. Eyes: Negative. Respiratory:  Negative for cough, chest tightness, shortness of breath and wheezing. Cardiovascular:  Negative for chest pain. Gastrointestinal:  Negative for abdominal pain, diarrhea and vomiting. Endocrine: Negative. Musculoskeletal:  Negative for arthralgias and myalgias. Skin:  Negative for rash. Neurological:  Negative for dizziness, weakness, light-headedness and headaches. Hematological:  Negative for adenopathy. Psychiatric/Behavioral: Negative.

## 2023-08-30 ENCOUNTER — NURSE TRIAGE (OUTPATIENT)
Dept: OTHER | Facility: CLINIC | Age: 68
End: 2023-08-30

## 2023-08-30 NOTE — TELEPHONE ENCOUNTER
Location of patient: [place]    Received call from [facility] at [facility] with [facility]. Subjective: Caller states \"Nausea and diarrhea   \"     Current Symptoms: Gassy, nausea, diarrhea, and loss of appetite sine Sunday night. Nausea all the time. 6 diarrhea Monday, a couple times today with loose stool. Chills but not feeling hot to touch. Onset: 3 days ago; unchanged    Associated Symptoms: NA    Pain Severity: Denies    Temperature: Denies    What has been tried: Tums- not helping    LMP: NA Pregnant: NA    Recommended disposition: See in Office Today or Tomorrow, prefers tomorrow    Care advice provided, patient verbalizes understanding; denies any other questions or concerns; instructed to call back for any new or worsening symptoms. Patient/Caller agrees with recommended disposition; writer provided warm transfer to [employer] at [facility] for appointment scheduling    Attention Provider: Thank you for allowing me to participate in the care of your patient. The patient was connected to triage in response to information provided to the ECC/PSC. Please do not respond through this encounter as the response is not directed to a shared pool.       Reason for Disposition   Patient wants to be seen    Protocols used: Nausea-ADULT-[place]

## 2023-08-31 ENCOUNTER — OFFICE VISIT (OUTPATIENT)
Dept: FAMILY MEDICINE CLINIC | Age: 68
End: 2023-08-31
Payer: MEDICARE

## 2023-08-31 VITALS
DIASTOLIC BLOOD PRESSURE: 62 MMHG | TEMPERATURE: 97.9 F | SYSTOLIC BLOOD PRESSURE: 122 MMHG | HEART RATE: 55 BPM | WEIGHT: 135 LBS | BODY MASS INDEX: 23.92 KG/M2 | OXYGEN SATURATION: 99 % | HEIGHT: 63 IN

## 2023-08-31 DIAGNOSIS — R11.0 NAUSEA: ICD-10-CM

## 2023-08-31 DIAGNOSIS — R19.7 DIARRHEA, UNSPECIFIED TYPE: Primary | ICD-10-CM

## 2023-08-31 PROCEDURE — 99214 OFFICE O/P EST MOD 30 MIN: CPT | Performed by: NURSE PRACTITIONER

## 2023-08-31 PROCEDURE — 1123F ACP DISCUSS/DSCN MKR DOCD: CPT | Performed by: NURSE PRACTITIONER

## 2023-08-31 RX ORDER — LOPERAMIDE HYDROCHLORIDE 2 MG/1
2 CAPSULE ORAL 4 TIMES DAILY PRN
Qty: 15 CAPSULE | Refills: 0 | Status: CANCELLED | OUTPATIENT
Start: 2023-08-31 | End: 2023-09-10

## 2023-08-31 RX ORDER — ONDANSETRON 4 MG/1
4 TABLET, ORALLY DISINTEGRATING ORAL 3 TIMES DAILY PRN
Qty: 21 TABLET | Refills: 0 | Status: SHIPPED | OUTPATIENT
Start: 2023-08-31

## 2023-08-31 RX ORDER — DICYCLOMINE HYDROCHLORIDE 10 MG/1
10 CAPSULE ORAL 4 TIMES DAILY
Qty: 30 CAPSULE | Refills: 1 | Status: SHIPPED | OUTPATIENT
Start: 2023-08-31

## 2023-08-31 ASSESSMENT — ENCOUNTER SYMPTOMS
WHEEZING: 0
VOMITING: 0
SHORTNESS OF BREATH: 0
DIARRHEA: 1
RESPIRATORY NEGATIVE: 1
FLATUS: 1
COUGH: 0
BLOOD IN STOOL: 0

## 2023-08-31 NOTE — PROGRESS NOTES
[address] [facility] PRIMARY AND SPECIALTY CARE  [address] [facility] [address]  Dept: [phone]  Dept Fax: [phone]  Loc: [address] [place] [address] (: 1955) is a 80 y.o. female, Established patient, here for evaluation of the following chief complaint(s):  Diarrhea (Sx started on 2023 Had about 5 Bms. /Had only taken antiacids. Has not been eating very well. /Sx have gotten a better, she is now having nausea and gas. //States that she was taking Augmentin starting 2023.), Gas, and Nausea      PCP:  Susy Spencer MD      Diarrhea   This is a new problem. The current episode started in the past 7 days. The problem has been gradually improving. Diarrhea characteristics: loose. Associated symptoms include chills and increased flatus. Pertinent negatives include no coughing, fever or vomiting. Associated symptoms comments: nausea. Exacerbated by: hx of recent augmentin. Risk factors include recent antibiotic use. Treatments tried: tums. The treatment provided mild relief. Review of Systems   Constitutional:  Positive for chills. Negative for fatigue and fever. Respiratory: Negative. Negative for cough, shortness of breath and wheezing. Cardiovascular: Negative. Negative for chest pain, palpitations and leg swelling. Gastrointestinal:  Positive for diarrhea and flatus. Negative for blood in stool and vomiting. Psychiatric/Behavioral: Negative. Negative for behavioral problems. The patient is not nervous/anxious.       Past Medical History:   Diagnosis Date    Asthma     GERD (gastroesophageal reflux disease)     Osteopenia      Past Surgical History:   Procedure Laterality Date    APPENDECTOMY      BLADDER SUSPENSION N/A 2021    TRANSVAGINAL TENSION FREE VAGINAL TAPE- OBTURATOR CYSTOSCOPY, STICH REMOVAL RECTOCELE performed by Alysha Sanders DO at [facility]

## 2023-12-01 ENCOUNTER — OFFICE VISIT (OUTPATIENT)
Dept: FAMILY MEDICINE CLINIC | Age: 68
End: 2023-12-01
Payer: MEDICARE

## 2023-12-01 VITALS
HEART RATE: 63 BPM | SYSTOLIC BLOOD PRESSURE: 124 MMHG | WEIGHT: 136 LBS | BODY MASS INDEX: 24.1 KG/M2 | HEIGHT: 63 IN | TEMPERATURE: 97.5 F | DIASTOLIC BLOOD PRESSURE: 72 MMHG | OXYGEN SATURATION: 99 %

## 2023-12-01 DIAGNOSIS — H65.03 NON-RECURRENT ACUTE SEROUS OTITIS MEDIA OF BOTH EARS: Primary | ICD-10-CM

## 2023-12-01 DIAGNOSIS — J01.40 ACUTE NON-RECURRENT PANSINUSITIS: ICD-10-CM

## 2023-12-01 PROCEDURE — 99203 OFFICE O/P NEW LOW 30 MIN: CPT | Performed by: NURSE PRACTITIONER

## 2023-12-01 PROCEDURE — 1123F ACP DISCUSS/DSCN MKR DOCD: CPT | Performed by: NURSE PRACTITIONER

## 2023-12-01 RX ORDER — AMOXICILLIN AND CLAVULANATE POTASSIUM 875; 125 MG/1; MG/1
1 TABLET, FILM COATED ORAL 2 TIMES DAILY
Qty: 20 TABLET | Refills: 0 | Status: SHIPPED | OUTPATIENT
Start: 2023-12-01 | End: 2023-12-11

## 2023-12-01 ASSESSMENT — ENCOUNTER SYMPTOMS
COUGH: 1
SINUS PRESSURE: 1
SHORTNESS OF BREATH: 0
SORE THROAT: 1
HOARSE VOICE: 1
EYE REDNESS: 0
WHEEZING: 0
EYE ITCHING: 0
SINUS PAIN: 1
EYE DISCHARGE: 0
RHINORRHEA: 1

## 2023-12-11 SDOH — ECONOMIC STABILITY: FOOD INSECURITY: WITHIN THE PAST 12 MONTHS, YOU WORRIED THAT YOUR FOOD WOULD RUN OUT BEFORE YOU GOT MONEY TO BUY MORE.: NEVER TRUE

## 2023-12-11 SDOH — ECONOMIC STABILITY: TRANSPORTATION INSECURITY
IN THE PAST 12 MONTHS, HAS LACK OF TRANSPORTATION KEPT YOU FROM MEETINGS, WORK, OR FROM GETTING THINGS NEEDED FOR DAILY LIVING?: NO

## 2023-12-11 SDOH — ECONOMIC STABILITY: FOOD INSECURITY: WITHIN THE PAST 12 MONTHS, THE FOOD YOU BOUGHT JUST DIDN'T LAST AND YOU DIDN'T HAVE MONEY TO GET MORE.: NEVER TRUE

## 2023-12-11 SDOH — ECONOMIC STABILITY: INCOME INSECURITY: HOW HARD IS IT FOR YOU TO PAY FOR THE VERY BASICS LIKE FOOD, HOUSING, MEDICAL CARE, AND HEATING?: NOT VERY HARD

## 2023-12-13 ENCOUNTER — OFFICE VISIT (OUTPATIENT)
Dept: FAMILY MEDICINE CLINIC | Age: 68
End: 2023-12-13
Payer: MEDICARE

## 2023-12-13 VITALS
RESPIRATION RATE: 16 BRPM | BODY MASS INDEX: 23.92 KG/M2 | HEART RATE: 66 BPM | HEIGHT: 63 IN | DIASTOLIC BLOOD PRESSURE: 68 MMHG | OXYGEN SATURATION: 99 % | SYSTOLIC BLOOD PRESSURE: 116 MMHG | WEIGHT: 135 LBS

## 2023-12-13 DIAGNOSIS — Z00.00 ANNUAL PHYSICAL EXAM: ICD-10-CM

## 2023-12-13 DIAGNOSIS — J45.20 MILD INTERMITTENT ASTHMA WITHOUT COMPLICATION: ICD-10-CM

## 2023-12-13 DIAGNOSIS — G90.01 CAROTIDYNIA: Primary | ICD-10-CM

## 2023-12-13 LAB
ALBUMIN SERPL-MCNC: 4 G/DL (ref 3.5–4.6)
ALP SERPL-CCNC: 43 U/L (ref 40–130)
ALT SERPL-CCNC: 11 U/L (ref 0–33)
ANION GAP SERPL CALCULATED.3IONS-SCNC: 10 MEQ/L (ref 9–15)
AST SERPL-CCNC: 18 U/L (ref 0–35)
BASOPHILS # BLD: 0.1 K/UL (ref 0–0.2)
BASOPHILS NFR BLD: 0.6 %
BILIRUB SERPL-MCNC: 0.5 MG/DL (ref 0.2–0.7)
BUN SERPL-MCNC: 10 MG/DL (ref 8–23)
CALCIUM SERPL-MCNC: 9 MG/DL (ref 8.5–9.9)
CHLORIDE SERPL-SCNC: 103 MEQ/L (ref 95–107)
CHOLEST SERPL-MCNC: 225 MG/DL (ref 0–199)
CO2 SERPL-SCNC: 26 MEQ/L (ref 20–31)
CREAT SERPL-MCNC: 0.65 MG/DL (ref 0.5–0.9)
EOSINOPHIL # BLD: 0.2 K/UL (ref 0–0.7)
EOSINOPHIL NFR BLD: 2.2 %
ERYTHROCYTE [DISTWIDTH] IN BLOOD BY AUTOMATED COUNT: 13.5 % (ref 11.5–14.5)
GLOBULIN SER CALC-MCNC: 2.6 G/DL (ref 2.3–3.5)
GLUCOSE SERPL-MCNC: 89 MG/DL (ref 70–99)
HCT VFR BLD AUTO: 40.5 % (ref 37–47)
HDLC SERPL-MCNC: 77 MG/DL (ref 40–59)
HGB BLD-MCNC: 13.4 G/DL (ref 12–16)
LDLC SERPL CALC-MCNC: 133 MG/DL (ref 0–129)
LYMPHOCYTES # BLD: 1.8 K/UL (ref 1–4.8)
LYMPHOCYTES NFR BLD: 23.5 %
MCH RBC QN AUTO: 31.1 PG (ref 27–31.3)
MCHC RBC AUTO-ENTMCNC: 33.1 % (ref 33–37)
MCV RBC AUTO: 94 FL (ref 79.4–94.8)
MONOCYTES # BLD: 0.6 K/UL (ref 0.2–0.8)
MONOCYTES NFR BLD: 7.3 %
NEUTROPHILS # BLD: 5.1 K/UL (ref 1.4–6.5)
NEUTS SEG NFR BLD: 66.3 %
PLATELET # BLD AUTO: 260 K/UL (ref 130–400)
POTASSIUM SERPL-SCNC: 4.5 MEQ/L (ref 3.4–4.9)
PROT SERPL-MCNC: 6.6 G/DL (ref 6.3–8)
RBC # BLD AUTO: 4.31 M/UL (ref 4.2–5.4)
SODIUM SERPL-SCNC: 139 MEQ/L (ref 135–144)
TRIGL SERPL-MCNC: 74 MG/DL (ref 0–150)
WBC # BLD AUTO: 7.8 K/UL (ref 4.8–10.8)

## 2023-12-13 PROCEDURE — 1123F ACP DISCUSS/DSCN MKR DOCD: CPT | Performed by: FAMILY MEDICINE

## 2023-12-13 PROCEDURE — 99213 OFFICE O/P EST LOW 20 MIN: CPT | Performed by: FAMILY MEDICINE

## 2023-12-13 RX ORDER — ALBUTEROL SULFATE 90 UG/1
2 AEROSOL, METERED RESPIRATORY (INHALATION) EVERY 6 HOURS PRN
Qty: 1 EACH | Refills: 3 | Status: SHIPPED | OUTPATIENT
Start: 2023-12-13

## 2023-12-13 RX ORDER — FLUTICASONE PROPIONATE 50 MCG
2 SPRAY, SUSPENSION (ML) NASAL DAILY
Qty: 16 G | Refills: 0 | Status: SHIPPED | OUTPATIENT
Start: 2023-12-13

## 2023-12-13 ASSESSMENT — ENCOUNTER SYMPTOMS
COUGH: 0
EYES NEGATIVE: 1
GASTROINTESTINAL NEGATIVE: 1
CHEST TIGHTNESS: 0
RESPIRATORY NEGATIVE: 1
RHINORRHEA: 0

## 2023-12-13 NOTE — PROGRESS NOTES
of Occurrences:   1     Standing Expiration Date:   2024    CBC with Auto Differential     Standing Status:   Future     Number of Occurrences:   1     Standing Expiration Date:   2024     Orders Placed This Encounter   Medications    albuterol sulfate HFA (PROVENTIL;VENTOLIN;PROAIR) 108 (90 Base) MCG/ACT inhaler     Sig: Inhale 2 puffs into the lungs every 6 hours as needed for Wheezing     Dispense:  1 each     Refill:  3    fluticasone (FLONASE) 50 MCG/ACT nasal spray     Si sprays by Each Nostril route daily     Dispense:  16 g     Refill:  0     No follow-ups on file.   Alexander Collier MD

## 2023-12-15 ENCOUNTER — HOSPITAL ENCOUNTER (OUTPATIENT)
Dept: WOMENS IMAGING | Age: 68
End: 2023-12-15
Payer: MEDICARE

## 2023-12-15 DIAGNOSIS — Z12.31 ENCOUNTER FOR SCREENING MAMMOGRAM FOR MALIGNANT NEOPLASM OF BREAST: ICD-10-CM

## 2023-12-15 PROCEDURE — 77063 BREAST TOMOSYNTHESIS BI: CPT

## 2024-01-08 RX ORDER — FLUTICASONE PROPIONATE 50 MCG
2 SPRAY, SUSPENSION (ML) NASAL DAILY
Qty: 16 G | Refills: 3 | Status: SHIPPED | OUTPATIENT
Start: 2024-01-08

## 2024-03-12 ENCOUNTER — PATIENT MESSAGE (OUTPATIENT)
Dept: FAMILY MEDICINE CLINIC | Age: 69
End: 2024-03-12

## 2024-03-13 RX ORDER — CEFDINIR 300 MG/1
300 CAPSULE ORAL 2 TIMES DAILY
Qty: 20 CAPSULE | Refills: 0 | Status: SHIPPED | OUTPATIENT
Start: 2024-03-13 | End: 2024-03-23

## 2024-09-06 ENCOUNTER — TELEMEDICINE (OUTPATIENT)
Dept: FAMILY MEDICINE CLINIC | Age: 69
End: 2024-09-06

## 2024-09-06 DIAGNOSIS — Z00.00 MEDICARE ANNUAL WELLNESS VISIT, SUBSEQUENT: Primary | ICD-10-CM

## 2024-09-06 ASSESSMENT — LIFESTYLE VARIABLES
HOW OFTEN DO YOU HAVE A DRINK CONTAINING ALCOHOL: 2-4 TIMES A MONTH
HOW MANY STANDARD DRINKS CONTAINING ALCOHOL DO YOU HAVE ON A TYPICAL DAY: 1 OR 2

## 2024-09-06 ASSESSMENT — PATIENT HEALTH QUESTIONNAIRE - PHQ9
SUM OF ALL RESPONSES TO PHQ9 QUESTIONS 1 & 2: 0
1. LITTLE INTEREST OR PLEASURE IN DOING THINGS: NOT AT ALL
SUM OF ALL RESPONSES TO PHQ QUESTIONS 1-9: 0
2. FEELING DOWN, DEPRESSED OR HOPELESS: NOT AT ALL
SUM OF ALL RESPONSES TO PHQ QUESTIONS 1-9: 0

## 2024-09-06 NOTE — PROGRESS NOTES
Medicare Annual Wellness Visit    Ilda Betancourt is here for Medicare AWV    Assessment & Plan   Medicare annual wellness visit, subsequent    Recommendations for Preventive Services Due: see orders and patient instructions/AVS.  Recommended screening schedule for the next 5-10 years is provided to the patient in written form: see Patient Instructions/AVS.     Return in 1 year (on 9/6/2025) for Medicare AWV.     Subjective       Patient's complete Health Risk Assessment and screening values have been reviewed and are found in Flowsheets. The following problems were reviewed today and where indicated follow up appointments were made and/or referrals ordered.    No Positive Risk Factors identified today.                                    Objective    Patient-Reported Vitals  No data recorded               Allergies   Allergen Reactions    Sulfamethoxazole-Trimethoprim Itching and Rash     Prior to Visit Medications    Medication Sig Taking? Authorizing Provider   fluticasone (FLONASE) 50 MCG/ACT nasal spray 2 sprays by Each Nostril route daily  Matheus Jones MD   albuterol sulfate HFA (PROVENTIL;VENTOLIN;PROAIR) 108 (90 Base) MCG/ACT inhaler Inhale 2 puffs into the lungs every 6 hours as needed for Wheezing  Matheus Jones MD   estradiol (CLIMARA) 0.1 MG/24HR APPLY 1 PATCH 2 TIMES A    WEEK  Neeraj Quinones, DO   Calcium 200 MG TABS Take 600 mg by mouth  Provider, MD Leobardo       [facility] (Including outside providers/suppliers regularly involved in providing care):   Patient Care Team:  Matheus Jones MD as PCP - General (Family Medicine)  Matheus Jones MD as PCP - Empaneled Provider  Michael Gustafson MD (Gastroenterology)      Reviewed and updated this visit:          Ilda CHRISTOPHER Betancourt, was evaluated through a synchronous (real-time) audio-video encounter. The patient (or guardian if applicable) is aware that this is a billable service, which includes applicable co-pays. This Virtual Visit was conducted

## 2024-11-22 ENCOUNTER — PATIENT MESSAGE (OUTPATIENT)
Dept: FAMILY MEDICINE CLINIC | Age: 69
End: 2024-11-22

## 2024-11-22 DIAGNOSIS — Z12.31 ENCOUNTER FOR SCREENING MAMMOGRAM FOR MALIGNANT NEOPLASM OF BREAST: ICD-10-CM

## 2024-11-22 DIAGNOSIS — Z12.39 BREAST CANCER SCREENING, HIGH RISK PATIENT: Primary | ICD-10-CM

## 2024-12-04 ENCOUNTER — OFFICE VISIT (OUTPATIENT)
Dept: OBGYN CLINIC | Age: 69
End: 2024-12-04
Payer: MEDICARE

## 2024-12-04 VITALS
SYSTOLIC BLOOD PRESSURE: 122 MMHG | DIASTOLIC BLOOD PRESSURE: 76 MMHG | HEART RATE: 70 BPM | BODY MASS INDEX: 26.22 KG/M2 | WEIGHT: 148 LBS

## 2024-12-04 DIAGNOSIS — E89.41 SYMPTOMATIC POSTSURGICAL MENOPAUSE: Primary | ICD-10-CM

## 2024-12-04 PROCEDURE — 1123F ACP DISCUSS/DSCN MKR DOCD: CPT | Performed by: ADVANCED PRACTICE MIDWIFE

## 2024-12-04 PROCEDURE — 99213 OFFICE O/P EST LOW 20 MIN: CPT | Performed by: ADVANCED PRACTICE MIDWIFE

## 2024-12-04 PROCEDURE — 1159F MED LIST DOCD IN RCRD: CPT | Performed by: ADVANCED PRACTICE MIDWIFE

## 2024-12-04 RX ORDER — ESTRADIOL 0.1 MG/D
1 PATCH TRANSDERMAL
Qty: 24 PATCH | Refills: 4 | Status: SHIPPED | OUTPATIENT
Start: 2025-01-01 | End: 2026-01-31

## 2024-12-04 RX ORDER — ESTRADIOL 0.1 MG/D
1 PATCH TRANSDERMAL
Qty: 8 PATCH | Refills: 0 | Status: SHIPPED | OUTPATIENT
Start: 2024-12-05 | End: 2024-12-04 | Stop reason: SDUPTHER

## 2024-12-04 RX ORDER — ESTRADIOL 0.1 MG/D
1 PATCH TRANSDERMAL
Qty: 8 PATCH | Refills: 0 | Status: SHIPPED | OUTPATIENT
Start: 2024-12-05 | End: 2025-01-04

## 2024-12-04 ASSESSMENT — ENCOUNTER SYMPTOMS
COUGH: 0
DIARRHEA: 0
CONSTIPATION: 0
SHORTNESS OF BREATH: 0
VOMITING: 0
ABDOMINAL PAIN: 0
NAUSEA: 0

## 2024-12-04 NOTE — PROGRESS NOTES
SUBJECTIVE:  Ilda Betancourt is a 69 y.o. female who presents here today for complaints of:      Chief Complaint   Patient presents with    Annual Exam     No pap due to age , mammogram already ordered      History of Surgical Menopause  Using estrogen patches to relieve menopausal symptoms with good results.  Patches are working well, denies adverse side effects.    Review of Systems   Respiratory:  Negative for cough and shortness of breath.    Gastrointestinal:  Negative for abdominal pain, constipation, diarrhea, nausea and vomiting.   Genitourinary:  Negative for difficulty urinating, dysuria, menstrual problem, pelvic pain, vaginal bleeding and vaginal discharge.   All other systems reviewed and are negative.    OBJECTIVE:  Vitals:  /76   Pulse 70   Wt 67.1 kg (148 lb)   BMI 26.22 kg/m²     Physical Exam  Constitutional:       General: She is not in acute distress.     Appearance: Normal appearance. She is not ill-appearing.   HENT:      Mouth/Throat:      Mouth: Mucous membranes are moist.   Eyes:      General: No scleral icterus.        Right eye: No discharge.         Left eye: No discharge.   Cardiovascular:      Rate and Rhythm: Normal rate.   Pulmonary:      Effort: Pulmonary effort is normal. No respiratory distress.   Chest:   Breasts:     Breasts are symmetrical.      Right: Normal. No swelling, bleeding, inverted nipple, mass, nipple discharge, skin change or tenderness.      Left: Normal. No swelling, bleeding, inverted nipple, mass, nipple discharge, skin change or tenderness.   Abdominal:      Palpations: Abdomen is soft.   Musculoskeletal:         General: Normal range of motion.      Cervical back: Normal range of motion and neck supple.      Right lower leg: No edema.      Left lower leg: No edema.   Lymphadenopathy:      Upper Body:      Right upper body: No supraclavicular, axillary or pectoral adenopathy.      Left upper body: No supraclavicular, axillary or pectoral adenopathy.

## 2024-12-11 ENCOUNTER — OFFICE VISIT (OUTPATIENT)
Dept: FAMILY MEDICINE CLINIC | Age: 69
End: 2024-12-11

## 2024-12-11 VITALS
WEIGHT: 148 LBS | DIASTOLIC BLOOD PRESSURE: 68 MMHG | HEART RATE: 60 BPM | RESPIRATION RATE: 17 BRPM | OXYGEN SATURATION: 99 % | SYSTOLIC BLOOD PRESSURE: 126 MMHG | BODY MASS INDEX: 26.22 KG/M2 | HEIGHT: 63 IN

## 2024-12-11 DIAGNOSIS — Z00.00 ANNUAL PHYSICAL EXAM: ICD-10-CM

## 2024-12-11 DIAGNOSIS — H91.91 HEARING LOSS OF RIGHT EAR, UNSPECIFIED HEARING LOSS TYPE: Primary | ICD-10-CM

## 2024-12-11 DIAGNOSIS — Z13.220 LIPID SCREENING: ICD-10-CM

## 2024-12-11 SDOH — ECONOMIC STABILITY: FOOD INSECURITY: WITHIN THE PAST 12 MONTHS, YOU WORRIED THAT YOUR FOOD WOULD RUN OUT BEFORE YOU GOT MONEY TO BUY MORE.: NEVER TRUE

## 2024-12-11 SDOH — ECONOMIC STABILITY: FOOD INSECURITY: WITHIN THE PAST 12 MONTHS, THE FOOD YOU BOUGHT JUST DIDN'T LAST AND YOU DIDN'T HAVE MONEY TO GET MORE.: NEVER TRUE

## 2024-12-11 SDOH — ECONOMIC STABILITY: INCOME INSECURITY: HOW HARD IS IT FOR YOU TO PAY FOR THE VERY BASICS LIKE FOOD, HOUSING, MEDICAL CARE, AND HEATING?: NOT HARD AT ALL

## 2024-12-11 ASSESSMENT — ENCOUNTER SYMPTOMS
RESPIRATORY NEGATIVE: 1
COUGH: 0
EYES NEGATIVE: 1
GASTROINTESTINAL NEGATIVE: 1
CHEST TIGHTNESS: 0
RHINORRHEA: 0

## 2024-12-11 NOTE — PROGRESS NOTES
Patient is seen in follow up for   Chief Complaint   Patient presents with    Annual Exam     HPIhere for annual exam feeling well. Found to have hearing loss in the right ear.    Past Medical History:   Diagnosis Date    Asthma     GERD (gastroesophageal reflux disease)     Osteopenia      delivery      Patient Active Problem List    Diagnosis Date Noted    H/O adenomatous polyp of colon 2022    Stress incontinence     Vaginal enterocele 10/03/2018    Osteopenia     Asthma     Gastroesophageal reflux disease     Pain in lower limb 10/20/2011    Synovitis and tenosynovitis 10/20/2011     Past Surgical History:   Procedure Laterality Date    APPENDECTOMY      BLADDER SUSPENSION N/A 2021    TRANSVAGINAL TENSION FREE VAGINAL TAPE- OBTURATOR CYSTOSCOPY, STICH REMOVAL RECTOCELE [place] [facility] [place]    COLONOSCOPY      COLONOSCOPY N/A 2022    COLORECTAL CANCER SCREENING, HIGH RISK performed by Michael Loving MD at [facility]ENTER    HYSTERECTOMY (CERVIX STATUS UNKNOWN)      total    KNEE ARTHROSCOPY  age 24    LAPAROSCOPIC APPENDECTOMY N/A 2020    APPENDECTOMY LAPAROSCOPIC [place]ro Massey [place] [facility] [place]    LAPAROSCOPY N/A 10/3/2018    LAPAROSCOPY EXPLORATORY performed [place]ro [place] [facility] [place]    OVARY REMOVAL      POLYPECTOMY      colon, stomach    [place] UNLISTED PROCEDURE RECTUM N/A 10/3/2018    TRANSVAGINAL RECTOCELE REPAIR and Diagnostic Laproscopy [place]ro [place] [facility] [place]    TONSILLECTOMY      child    UPPER GASTROINTESTINAL ENDOSCOPY  11/5/15     DR. LOVING     UPPER GASTROINTESTINAL ENDOSCOPY N/A 2020    EGD ESOPHAGOGASTRODUODENOSCOPY performed by Michael Loving MD at [facility]    WRIST SURGERY Right 3/3/2021    RIGHT WRIST DEQUERVAINS  RELEASE performed [place] Praveen Virk [place] [facility] [place]     Family History   Problem Relation Age of Onset    Arthritis Mother         RA    Asthma Mother     Kidney

## 2024-12-20 ENCOUNTER — HOSPITAL ENCOUNTER (OUTPATIENT)
Dept: WOMENS IMAGING | Age: 69
Discharge: HOME | End: 2024-12-22
Payer: MEDICARE

## 2024-12-20 DIAGNOSIS — Z12.31 ENCOUNTER FOR SCREENING MAMMOGRAM FOR MALIGNANT NEOPLASM OF BREAST: ICD-10-CM

## 2024-12-20 PROCEDURE — 77063 BREAST TOMOSYNTHESIS BI: CPT

## 2024-12-27 ENCOUNTER — OFFICE VISIT (OUTPATIENT)
Age: 69
End: 2024-12-27
Payer: MEDICARE

## 2024-12-27 VITALS
WEIGHT: 148 LBS | HEART RATE: 65 BPM | HEIGHT: 63 IN | OXYGEN SATURATION: 98 % | BODY MASS INDEX: 26.22 KG/M2 | TEMPERATURE: 97.6 F

## 2024-12-27 DIAGNOSIS — H91.8X3 ASYMMETRICAL HEARING LOSS: Primary | ICD-10-CM

## 2024-12-27 PROCEDURE — 1123F ACP DISCUSS/DSCN MKR DOCD: CPT | Performed by: STUDENT IN AN ORGANIZED HEALTH CARE EDUCATION/TRAINING PROGRAM

## 2024-12-27 PROCEDURE — 99204 OFFICE O/P NEW MOD 45 MIN: CPT | Performed by: STUDENT IN AN ORGANIZED HEALTH CARE EDUCATION/TRAINING PROGRAM

## 2024-12-27 NOTE — PROGRESS NOTES
[facility] [place] [facility], [facility] OTOLARYNGOLOGY  [address] [place] [address], [address]  [facility] [address]  Dept: [phone]  Dept Fax: [phone]  Loc: [phone]     2024    Visit type: New patient    Reason for Visit: Hearing Problem       ASSESSMENT/PLAN   1. Asymmetrical hearing loss  -     MRI IAC POSTERIOR FOSSA W WO CONTRAST; Future    No orders of the defined types were placed in this encounter.     Audio reviwed with significant RIGHT sideded SNHL (occurring over years) No indication for oral steroid treatment (no sudden loss)   Further eval with MRI - will call w results.   Subjective    Patient: Ilda Betancourt is a 69 y.o. female   HPI:    Referred by: Matheus Jones MD  I have personally reviewed the note by the referring provider     Right sided gradual hearing loss   When younger, had trouble with dizzy spells. Was told it might be Meniere's  Hasn't had any dizzy spells in 4-5 months   No family history of hearing loss   No significant noise exposure   NO history of ear infections recently, more as a kid     Allergies   Allergen Reactions    Sulfamethoxazole-Trimethoprim Itching and Rash       Current Outpatient Medications:     [START ON 2025] estradiol (CLIMARA) 0.1 MG/24HR, Place 1 patch onto the skin Twice a Week, Disp: 24 patch, Rfl: 4    estradiol (CLIMARA) 0.1 MG/24HR, Place 1 patch onto the skin Twice a Week, Disp: 8 patch, Rfl: 0    albuterol sulfate HFA (PROVENTIL;VENTOLIN;PROAIR) 108 (90 Base) MCG/ACT inhaler, Inhale 2 puffs into the lungs every 6 hours as needed for Wheezing, Disp: 1 each, Rfl: 3   Past Medical History:   Diagnosis Date    Asthma     GERD (gastroesophageal reflux disease)     Osteopenia      delivery      Past Surgical History:   Procedure Laterality Date    APPENDECTOMY      BLADDER SUSPENSION N/A 2021    TRANSVAGINAL TENSION FREE VAGINAL TAPE- OBTURATOR CYSTOSCOPY, STICH REMOVAL RECTOCELE performed by Neeraj VALENTIN

## 2024-12-31 ENCOUNTER — HOSPITAL ENCOUNTER (OUTPATIENT)
Dept: LAB | Age: 69
Discharge: HOME | End: 2024-12-31
Payer: MEDICARE

## 2024-12-31 ENCOUNTER — OFFICE VISIT (OUTPATIENT)
Dept: FAMILY MEDICINE CLINIC | Age: 69
End: 2024-12-31
Payer: MEDICARE

## 2024-12-31 VITALS
TEMPERATURE: 98.6 F | OXYGEN SATURATION: 98 % | SYSTOLIC BLOOD PRESSURE: 124 MMHG | BODY MASS INDEX: 24.84 KG/M2 | HEART RATE: 78 BPM | DIASTOLIC BLOOD PRESSURE: 80 MMHG | HEIGHT: 63 IN | WEIGHT: 140.2 LBS

## 2024-12-31 DIAGNOSIS — Z00.00 ANNUAL PHYSICAL EXAM: ICD-10-CM

## 2024-12-31 DIAGNOSIS — J01.00 ACUTE NON-RECURRENT MAXILLARY SINUSITIS: Primary | ICD-10-CM

## 2024-12-31 DIAGNOSIS — Z13.220 LIPID SCREENING: ICD-10-CM

## 2024-12-31 LAB
ALBUMIN SERPL-MCNC: 4.2 G/DL (ref 3.5–4.6)
ALP SERPL-CCNC: 48 U/L (ref 40–130)
ALT SERPL-CCNC: 10 U/L (ref 0–33)
ANION GAP SERPL CALCULATED.3IONS-SCNC: 9 MEQ/L (ref 9–15)
AST SERPL-CCNC: 14 U/L (ref 0–35)
BASOPHILS # BLD: 0 K/UL (ref 0–0.2)
BASOPHILS NFR BLD: 0.4 %
BILIRUB SERPL-MCNC: 0.3 MG/DL (ref 0.2–0.7)
BUN SERPL-MCNC: 12 MG/DL (ref 8–23)
CALCIUM SERPL-MCNC: 9.3 MG/DL (ref 8.5–9.9)
CHLORIDE SERPL-SCNC: 99 MEQ/L (ref 95–107)
CHOLEST SERPL-MCNC: 245 MG/DL (ref 0–199)
CO2 SERPL-SCNC: 29 MEQ/L (ref 20–31)
CREAT SERPL-MCNC: 0.69 MG/DL (ref 0.5–0.9)
EOSINOPHIL # BLD: 0.2 K/UL (ref 0–0.7)
EOSINOPHIL NFR BLD: 2.2 %
ERYTHROCYTE [DISTWIDTH] IN BLOOD BY AUTOMATED COUNT: 13.1 % (ref 11.5–14.5)
GLOBULIN SER CALC-MCNC: 2.5 G/DL (ref 2.3–3.5)
GLUCOSE SERPL-MCNC: 100 MG/DL (ref 70–99)
HCT VFR BLD AUTO: 40.9 % (ref 37–47)
HDLC SERPL-MCNC: 79 MG/DL (ref 40–59)
HGB BLD-MCNC: 13.9 G/DL (ref 12–16)
LDLC SERPL CALC-MCNC: 138 MG/DL (ref 0–129)
LYMPHOCYTES # BLD: 1.5 K/UL (ref 1–4.8)
LYMPHOCYTES NFR BLD: 21.6 %
MCH RBC QN AUTO: 32.5 PG (ref 27–31.3)
MCHC RBC AUTO-ENTMCNC: 34 % (ref 33–37)
MCV RBC AUTO: 95.6 FL (ref 79.4–94.8)
MONOCYTES # BLD: 0.4 K/UL (ref 0.2–0.8)
MONOCYTES NFR BLD: 6.3 %
NEUTROPHILS # BLD: 4.8 K/UL (ref 1.4–6.5)
NEUTS SEG NFR BLD: 69.2 %
PLATELET # BLD AUTO: 232 K/UL (ref 130–400)
POTASSIUM SERPL-SCNC: 3.9 MEQ/L (ref 3.4–4.9)
PROT SERPL-MCNC: 6.7 G/DL (ref 6.3–8)
RBC # BLD AUTO: 4.28 M/UL (ref 4.2–5.4)
SODIUM SERPL-SCNC: 137 MEQ/L (ref 135–144)
TRIGL SERPL-MCNC: 139 MG/DL (ref 0–150)
WBC # BLD AUTO: 7 K/UL (ref 4.8–10.8)

## 2024-12-31 PROCEDURE — 80053 COMPREHEN METABOLIC PANEL: CPT

## 2024-12-31 PROCEDURE — 1123F ACP DISCUSS/DSCN MKR DOCD: CPT | Performed by: PHYSICIAN ASSISTANT

## 2024-12-31 PROCEDURE — 80061 LIPID PANEL: CPT

## 2024-12-31 PROCEDURE — 36415 COLL VENOUS BLD VENIPUNCTURE: CPT

## 2024-12-31 PROCEDURE — 1160F RVW MEDS BY RX/DR IN RCRD: CPT | Performed by: PHYSICIAN ASSISTANT

## 2024-12-31 PROCEDURE — 85025 COMPLETE CBC W/AUTO DIFF WBC: CPT

## 2024-12-31 PROCEDURE — 99213 OFFICE O/P EST LOW 20 MIN: CPT | Performed by: PHYSICIAN ASSISTANT

## 2024-12-31 PROCEDURE — 1159F MED LIST DOCD IN RCRD: CPT | Performed by: PHYSICIAN ASSISTANT

## 2024-12-31 RX ORDER — BENZONATATE 200 MG/1
200 CAPSULE ORAL 3 TIMES DAILY
Qty: 15 CAPSULE | Refills: 0 | Status: SHIPPED | OUTPATIENT
Start: 2024-12-31 | End: 2025-01-05

## 2024-12-31 ASSESSMENT — ENCOUNTER SYMPTOMS
SINUS PRESSURE: 1
EYES NEGATIVE: 1
RESPIRATORY NEGATIVE: 1
GASTROINTESTINAL NEGATIVE: 1

## 2024-12-31 NOTE — PROGRESS NOTES
[facility]  [address] [id number]  Dept: [phone]  Loc: [phone]     Pt Name: Ilda Betancourt  MRN: [id number]  Birthdate 1955      HISTORY OF PRESENT ILLNESS    Ilda Betancourt is a 69 y.o. female who presents to the [facility] with chief complaint and she has been battling sinus congestion and cough that has been going on for about 9 days.  She says it was clear at first and then it has progressed into thick mucous.  She has a lot of facial pressure.  She has some chills, but not a high fever that she is aware her. Her grand kids were ill with upper respiratory symptoms.         REVIEW OF SYSTEMS       Review of Systems   Constitutional: Negative.    HENT:  Positive for congestion and sinus pressure.    Eyes: Negative.    Respiratory: Negative.     Cardiovascular: Negative.    Gastrointestinal: Negative.    Endocrine: Negative.    Genitourinary: Negative.    Musculoskeletal: Negative.    Skin: Negative.    Neurological: Negative.    Psychiatric/Behavioral: Negative.           PAST MEDICAL HISTORY     Past Medical History:   Diagnosis Date    Asthma     GERD (gastroesophageal reflux disease)     Osteopenia      delivery          SURGICAL HISTORY       Past Surgical History:   Procedure Laterality Date    APPENDECTOMY      BLADDER SUSPENSION N/A 2021    TRANSVAGINAL TENSION FREE VAGINAL TAPE- OBTURATOR CYSTOSCOPY, STICH REMOVAL RECTOCELE [place]eraj [place]ones [place] [facility] [place]    BREAST SURGERY Left     B9 excisional bx    COLONOSCOPY      COLONOSCOPY N/A 2022    COLORECTAL CANCER SCREENING, HIGH RISK performed by Michael Gustafson MD at [facility] CENTER    HYSTERECTOMY (CERVIX STATUS UNKNOWN)      total    KNEE ARTHROSCOPY  age 24    LAPAROSCOPIC APPENDECTOMY N/A 2020    APPENDECTOMY LAPAROSCOPIC [place]sey [place] [facility] [place]    LAPAROSCOPY N/A 10/03/2018    LAPAROSCOPY

## 2024-12-31 NOTE — PATIENT INSTRUCTIONS
Claritin D 12 hour over the counter daily at breakfast x 7 days to help clear out sinus congestion   Probiotics daily x 3 months

## 2025-01-16 ENCOUNTER — OFFICE VISIT (OUTPATIENT)
Age: 70
End: 2025-01-16

## 2025-01-16 VITALS
OXYGEN SATURATION: 100 % | HEART RATE: 70 BPM | WEIGHT: 140.21 LBS | TEMPERATURE: 97.2 F | BODY MASS INDEX: 24.84 KG/M2 | HEIGHT: 63 IN | SYSTOLIC BLOOD PRESSURE: 136 MMHG | DIASTOLIC BLOOD PRESSURE: 62 MMHG | RESPIRATION RATE: 20 BRPM

## 2025-01-16 DIAGNOSIS — R09.81 NASAL CONGESTION: ICD-10-CM

## 2025-01-16 DIAGNOSIS — J01.91 ACUTE RECURRENT SINUSITIS, UNSPECIFIED LOCATION: Primary | ICD-10-CM

## 2025-01-16 RX ORDER — DEXTROMETHORPHAN HBR AND GUAIFENESIN 5; 100 MG/5ML; MG/5ML
5 LIQUID ORAL EVERY 4 HOURS PRN
Qty: 355 ML | Refills: 0 | Status: CANCELLED | OUTPATIENT
Start: 2025-01-16

## 2025-01-16 RX ORDER — BENZONATATE 100 MG/1
100 CAPSULE ORAL 3 TIMES DAILY PRN
Qty: 30 CAPSULE | Refills: 0 | Status: CANCELLED | OUTPATIENT
Start: 2025-01-16 | End: 2025-01-26

## 2025-01-16 RX ORDER — FLUTICASONE PROPIONATE 50 MCG
1 SPRAY, SUSPENSION (ML) NASAL DAILY
Qty: 1 EACH | Refills: 0 | Status: CANCELLED | OUTPATIENT
Start: 2025-01-16

## 2025-01-16 RX ORDER — GLYCERIN 0.25 %
1 DROPS OPHTHALMIC (EYE) PRN
Qty: 1 EACH | Refills: 0 | Status: CANCELLED | OUTPATIENT
Start: 2025-01-16

## 2025-01-16 RX ORDER — ACETAMINOPHEN AND CHLORPHENIRAMINE MALEATE 325; 2 MG/1; MG/1
1 TABLET, FILM COATED ORAL EVERY 4 HOURS PRN
Qty: 24 TABLET | Refills: 0 | Status: SHIPPED | OUTPATIENT
Start: 2025-01-16

## 2025-01-16 SDOH — ECONOMIC STABILITY: FOOD INSECURITY: WITHIN THE PAST 12 MONTHS, THE FOOD YOU BOUGHT JUST DIDN'T LAST AND YOU DIDN'T HAVE MONEY TO GET MORE.: NEVER TRUE

## 2025-01-16 SDOH — ECONOMIC STABILITY: FOOD INSECURITY: WITHIN THE PAST 12 MONTHS, YOU WORRIED THAT YOUR FOOD WOULD RUN OUT BEFORE YOU GOT MONEY TO BUY MORE.: NEVER TRUE

## 2025-01-16 ASSESSMENT — PATIENT HEALTH QUESTIONNAIRE - PHQ9
1. LITTLE INTEREST OR PLEASURE IN DOING THINGS: NOT AT ALL
SUM OF ALL RESPONSES TO PHQ9 QUESTIONS 1 & 2: 0
2. FEELING DOWN, DEPRESSED OR HOPELESS: NOT AT ALL
SUM OF ALL RESPONSES TO PHQ QUESTIONS 1-9: 0

## 2025-01-16 ASSESSMENT — ENCOUNTER SYMPTOMS: COUGH: 0

## 2025-01-16 NOTE — PROGRESS NOTES
effort is normal. No respiratory distress.      Breath sounds: Normal breath sounds. No wheezing.   Musculoskeletal:      Cervical back: Normal range of motion.   Lymphadenopathy:      Cervical: No cervical adenopathy.   Neurological:      Mental Status: She is alert.        Assessment & Plan   1. Acute recurrent sinusitis, unspecified location  -Symptoms initially improved but have now recurred.  She did well with Augmentin, will extend the duration to 10 more days  -     amoxicillin-clavulanate (AUGMENTIN) 875-125 MG per tablet; Take 1 tablet by mouth 2 times daily for 10 days, Disp-20 tablet, R-0Normal    2. Nasal congestion  -     Chlorpheniramine-APAP (CORICIDIN) 2-325 MG TABS; Take 1 tablet by mouth every 4 hours as needed (congestion), Disp-24 tablet, R-0Normal  - continue flonase     Return if symptoms worsen or fail to improve.    Edita Cameron MD  1/16/25  12:12 PM    This report has been partially produced using speech recognition software and may cause and/or contain errors related to that system including grammar, punctuation, and spelling as well as words and phrases that may seem inappropriate.

## 2025-01-17 ENCOUNTER — HOSPITAL ENCOUNTER (OUTPATIENT)
Dept: MRI IMAGING | Age: 70
Discharge: HOME | End: 2025-01-19
Attending: STUDENT IN AN ORGANIZED HEALTH CARE EDUCATION/TRAINING PROGRAM
Payer: MEDICARE

## 2025-01-17 DIAGNOSIS — H91.8X3 ASYMMETRICAL HEARING LOSS: ICD-10-CM

## 2025-01-17 PROCEDURE — 70553 MRI BRAIN STEM W/O & W/DYE: CPT

## 2025-01-17 PROCEDURE — 6360000004 HC RX CONTRAST MEDICATION: Performed by: STUDENT IN AN ORGANIZED HEALTH CARE EDUCATION/TRAINING PROGRAM

## 2025-01-17 PROCEDURE — A9579 GAD-BASE MR CONTRAST NOS,1ML: HCPCS | Performed by: STUDENT IN AN ORGANIZED HEALTH CARE EDUCATION/TRAINING PROGRAM

## 2025-01-17 RX ADMIN — GADOTERIDOL 15 ML: 279.3 INJECTION, SOLUTION INTRAVENOUS at 12:58

## 2025-04-14 ENCOUNTER — OFFICE VISIT (OUTPATIENT)
Age: 70
End: 2025-04-14
Payer: MEDICARE

## 2025-04-14 VITALS
HEIGHT: 62 IN | HEART RATE: 66 BPM | SYSTOLIC BLOOD PRESSURE: 117 MMHG | RESPIRATION RATE: 15 BRPM | WEIGHT: 139 LBS | OXYGEN SATURATION: 98 % | BODY MASS INDEX: 25.58 KG/M2 | DIASTOLIC BLOOD PRESSURE: 78 MMHG | TEMPERATURE: 97 F

## 2025-04-14 DIAGNOSIS — J30.2 SEASONAL ALLERGIES: ICD-10-CM

## 2025-04-14 DIAGNOSIS — J30.9 ALLERGIC RHINITIS, UNSPECIFIED SEASONALITY, UNSPECIFIED TRIGGER: Primary | ICD-10-CM

## 2025-04-14 PROCEDURE — 1160F RVW MEDS BY RX/DR IN RCRD: CPT | Performed by: STUDENT IN AN ORGANIZED HEALTH CARE EDUCATION/TRAINING PROGRAM

## 2025-04-14 PROCEDURE — 99212 OFFICE O/P EST SF 10 MIN: CPT | Performed by: STUDENT IN AN ORGANIZED HEALTH CARE EDUCATION/TRAINING PROGRAM

## 2025-04-14 PROCEDURE — 1159F MED LIST DOCD IN RCRD: CPT | Performed by: STUDENT IN AN ORGANIZED HEALTH CARE EDUCATION/TRAINING PROGRAM

## 2025-04-14 PROCEDURE — 1123F ACP DISCUSS/DSCN MKR DOCD: CPT | Performed by: STUDENT IN AN ORGANIZED HEALTH CARE EDUCATION/TRAINING PROGRAM

## 2025-04-14 NOTE — PROGRESS NOTES
[facility] [place] [facility], [facility] OTOLARYNGOLOGY  [address] [place] [address], [address]  [place] [id number]  Dept: [phone]  Dept Fax: [phone]  Loc: [phone]     4/14/2025    Visit type: Follow up    Reason for Visit: Follow-up       ASSESSMENT/PLAN   1. Allergic rhinitis, unspecified seasonality, unspecified trigger  -     Allergen, Respiratory, Region 5 Panel; Future  2. Seasonal allergies  -     Allergen, Respiratory, Region 5 Panel; Future    No follow-ups on file.  No orders of the defined types were placed in this encounter.       Subjective    Patient: Ilda Betancourt is a 69 y.o. female   HPI:    Recall,   12/27/24  Right sided gradual hearing loss   When younger, had trouble with dizzy spells. Was told it might be Meniere's  Hasn't had any dizzy spells in 4-5 months   No family history of hearing loss   No significant noise exposure   NO history of ear infections recently, more as a kid     MRI 1/17/25  IMPRESSION:  1. No acute intracranial abnormality or pathologic enhancement.  2. No mass or abnormal enhancement within the cerebellopontine angle cisterns  or internal auditory canals.  3. Minimal, age-appropriate small vessel ischemic change.  4. Mild chronic right maxillary sinusitis.     Today,   History of Present Illness    3 sinus infections per year.   Every time gets a cold, turns into sinus infection    Uses flonase/Saline   Started to get nosebleeds     Spends winter in [place] (different allergy pattern)     Has Zyrtec nightly for the past few yrs     Main Symptoms:  Patient does not have anterior nasal drainage.     Patient has  posterior nasal drainage.    Patient has nasal airway obstruction. bilateral at the same time  Patient does not have  facial pain.    Patient has  facial pressure.    Patient has slight decreased sense of smell.     Associated Symptoms:   Patient has sneezing.     Other Pertinent Medical Conditions:   Patient has history of

## 2025-04-16 ENCOUNTER — TELEPHONE (OUTPATIENT)
Dept: OBGYN CLINIC | Age: 70
End: 2025-04-16

## 2025-04-16 NOTE — TELEPHONE ENCOUNTER
Spoke to Nestor JACKSON at [employer] and Medication Auth# [id number]  Qty Auth# [id number]  Both Approved from 3/17/25--4/16/26    Called and informed patient that it was Approved and she should be able to get them filled.

## 2025-04-16 NOTE — TELEPHONE ENCOUNTER
Pt. States new insurance requires PA on Estradiol patch. Pt. Provided PA number [phone]    Pt. States usually sees Joni but most recent visit with Janny.

## 2025-06-16 SDOH — ECONOMIC STABILITY: FOOD INSECURITY: WITHIN THE PAST 12 MONTHS, YOU WORRIED THAT YOUR FOOD WOULD RUN OUT BEFORE YOU GOT MONEY TO BUY MORE.: NEVER TRUE

## 2025-06-16 SDOH — ECONOMIC STABILITY: INCOME INSECURITY: IN THE LAST 12 MONTHS, WAS THERE A TIME WHEN YOU WERE NOT ABLE TO PAY THE MORTGAGE OR RENT ON TIME?: NO

## 2025-06-16 SDOH — ECONOMIC STABILITY: TRANSPORTATION INSECURITY
IN THE PAST 12 MONTHS, HAS THE LACK OF TRANSPORTATION KEPT YOU FROM MEDICAL APPOINTMENTS OR FROM GETTING MEDICATIONS?: NO

## 2025-06-16 SDOH — ECONOMIC STABILITY: FOOD INSECURITY: WITHIN THE PAST 12 MONTHS, THE FOOD YOU BOUGHT JUST DIDN'T LAST AND YOU DIDN'T HAVE MONEY TO GET MORE.: NEVER TRUE

## 2025-06-17 ENCOUNTER — OFFICE VISIT (OUTPATIENT)
Age: 70
End: 2025-06-17
Payer: MEDICARE

## 2025-06-17 VITALS
HEIGHT: 62 IN | RESPIRATION RATE: 16 BRPM | WEIGHT: 138 LBS | SYSTOLIC BLOOD PRESSURE: 132 MMHG | BODY MASS INDEX: 25.4 KG/M2 | DIASTOLIC BLOOD PRESSURE: 76 MMHG

## 2025-06-17 VITALS
OXYGEN SATURATION: 98 % | TEMPERATURE: 98 F | BODY MASS INDEX: 25.54 KG/M2 | HEART RATE: 67 BPM | DIASTOLIC BLOOD PRESSURE: 76 MMHG | WEIGHT: 138.8 LBS | SYSTOLIC BLOOD PRESSURE: 132 MMHG | HEIGHT: 62 IN

## 2025-06-17 DIAGNOSIS — J30.2 SEASONAL ALLERGIES: ICD-10-CM

## 2025-06-17 DIAGNOSIS — Z00.00 MEDICARE ANNUAL WELLNESS VISIT, SUBSEQUENT: Primary | ICD-10-CM

## 2025-06-17 DIAGNOSIS — J01.91 ACUTE RECURRENT SINUSITIS, UNSPECIFIED LOCATION: Primary | ICD-10-CM

## 2025-06-17 DIAGNOSIS — J30.9 ALLERGIC RHINITIS, UNSPECIFIED SEASONALITY, UNSPECIFIED TRIGGER: ICD-10-CM

## 2025-06-17 DIAGNOSIS — J45.20 MILD INTERMITTENT ASTHMA WITHOUT COMPLICATION: ICD-10-CM

## 2025-06-17 PROCEDURE — 99214 OFFICE O/P EST MOD 30 MIN: CPT | Performed by: FAMILY MEDICINE

## 2025-06-17 PROCEDURE — G0439 PPPS, SUBSEQ VISIT: HCPCS | Performed by: NURSE PRACTITIONER

## 2025-06-17 RX ORDER — AMOXICILLIN 875 MG/1
875 TABLET, COATED ORAL 2 TIMES DAILY
Qty: 20 TABLET | Refills: 0 | Status: SHIPPED | OUTPATIENT
Start: 2025-06-17 | End: 2025-06-27

## 2025-06-17 RX ORDER — FLUTICASONE PROPIONATE 50 MCG
2 SPRAY, SUSPENSION (ML) NASAL DAILY
Qty: 16 G | Refills: 2 | Status: SHIPPED | OUTPATIENT
Start: 2025-06-17

## 2025-06-17 RX ORDER — ALBUTEROL SULFATE 90 UG/1
2 INHALANT RESPIRATORY (INHALATION) EVERY 6 HOURS PRN
Qty: 1 EACH | Refills: 3 | Status: SHIPPED | OUTPATIENT
Start: 2025-06-17

## 2025-06-17 ASSESSMENT — PATIENT HEALTH QUESTIONNAIRE - PHQ9
1. LITTLE INTEREST OR PLEASURE IN DOING THINGS: NOT AT ALL
2. FEELING DOWN, DEPRESSED OR HOPELESS: NOT AT ALL
SUM OF ALL RESPONSES TO PHQ QUESTIONS 1-9: 0

## 2025-06-17 ASSESSMENT — LIFESTYLE VARIABLES
HOW MANY STANDARD DRINKS CONTAINING ALCOHOL DO YOU HAVE ON A TYPICAL DAY: 1 OR 2
HOW OFTEN DO YOU HAVE A DRINK CONTAINING ALCOHOL: 2-3 TIMES A WEEK

## 2025-06-17 NOTE — PROGRESS NOTES
Medicare Annual Wellness Visit    Ilda Betancourt is here for Medicare AWV    Assessment & Plan   Medicare annual wellness visit, subsequent       No follow-ups on file.     Subjective       Patient's complete Health Risk Assessment and screening values have been reviewed and are found in Flowsheets. The following problems were reviewed today and where indicated follow up appointments were made and/or referrals ordered.    Positive Risk Factor Screenings with Interventions:             General HRA Questions:  Select all that apply: (!) New or Increased Fatigue  Interventions Fatigue:  See AVS for additional education material                            Objective   Vitals:    06/17/25 1153   BP: 132/76   Resp: 16   Weight: 62.6 kg (138 lb)   Height: 1.575 m (5' 2\")      Body mass index is 25.24 kg/m².                  Allergies   Allergen Reactions    Sulfamethoxazole-Trimethoprim Itching and Rash     Prior to Visit Medications    Medication Sig Taking? Authorizing Provider   albuterol sulfate HFA (PROVENTIL;VENTOLIN;PROAIR) 108 (90 Base) MCG/ACT inhaler Inhale 2 puffs into the lungs every 6 hours as needed for Wheezing Yes Matheus Jones MD   amoxicillin (AMOXIL) 875 MG tablet Take 1 tablet by mouth 2 times daily for 10 days Yes Matheus Jones MD   fluticasone (FLONASE) 50 MCG/ACT nasal spray 2 sprays by Each Nostril route daily Yes Matheus Jones MD   Chlorpheniramine-APAP (CORICIDIN) 2-325 MG TABS Take 1 tablet by mouth every 4 hours as needed (congestion) Yes Edita Cameron MD   estradiol (CLIMARA) 0.1 MG/24HR Place 1 patch onto the skin Twice a Week Yes Janny Polanco APRN - JESSICA       CareTeam (Including outside providers/suppliers regularly involved in providing care):   Patient Care Team:  Matheus Jones MD as PCP - General (Family Medicine)  Matheus Jones MD as PCP - Empaneled Provider  Michael Gustafson MD (Gastroenterology)     Recommendations for Preventive Services Due: see orders and patient

## 2025-06-18 LAB
A ALTERNATA IGE QN: <0.1 KU/L (ref 0–0.34)
A FUMIGATUS IGE QN: <0.1 KU/L (ref 0–0.34)
AMER SYCAMORE IGE QN: <0.1 KU/L (ref 0–0.34)
BERMUDA GRASS IGE QN: <0.1 KU/L (ref 0–0.34)
BOXELDER IGE QN: <0.1 KU/L (ref 0–0.34)
C SPHAEROSPERMUM IGE QN: <0.1 KUL/L (ref 0–0.34)
CALIF WALNUT IGE QN: <0.1 KU/L (ref 0–0.34)
CAT DANDER IGE QN: <0.1 KU/L (ref 0–0.34)
CMN PIGWEED IGE QN: <0.1 KU/L (ref 0–0.34)
COMMON RAGWEED IGE QN: <0.1 KU/L (ref 0–0.34)
COTTONWOOD IGE QN: <0.1 KU/L (ref 0–0.34)
D FARINAE IGE QN: <0.1 KU/L (ref 0–0.34)
D PTERONYSS IGE QN: <0.1 KU/L (ref 0–0.34)
DOG DANDER IGE QN: <0.1 KU/L (ref 0–0.34)
IGE SERPL-ACNC: 12 IU/ML (ref 0–100)
M RACEMOSUS IGE QN: <0.1 KU/L (ref 0–0.34)
MOUSE EPITH IGE QN: <0.1 KU/L (ref 0–0.34)
P NOTATUM IGE QN: <0.1 KU/L (ref 0–0.34)
PECAN/HICK TREE IGE QN: <0.1 KU/L (ref 0–0.34)
RED CEDAR IGE QN: <0.1 KU/L (ref 0–0.34)
ROACH IGE QN: <0.1 KU/L (ref 0–0.34)
SALTWORT IGE QN: <0.1 KU/L (ref 0–0.34)
SHEEP SORREL IGE QN: <0.1 KU/L (ref 0–0.34)
SILVER BIRCH IGE QN: <0.1 KU/L (ref 0–0.34)
TIMOTHY IGE QN: <0.1 KU/L (ref 0–0.34)
WHITE ASH IGE QN: <0.1 KU/L (ref 0–0.34)
WHITE ELM IGE QN: <0.1 KU/L (ref 0–0.34)
WHITE MULBERRY IGE QN: <0.1 KU/L (ref 0–0.34)
WHITE OAK IGE QN: <0.1 KU/L (ref 0–0.34)

## 2025-06-19 ENCOUNTER — RESULTS FOLLOW-UP (OUTPATIENT)
Age: 70
End: 2025-06-19

## 2025-07-09 RX ORDER — FLUTICASONE PROPIONATE 50 MCG
2 SPRAY, SUSPENSION (ML) NASAL DAILY
Qty: 1 EACH | Refills: 0 | Status: SHIPPED | OUTPATIENT
Start: 2025-07-09

## 2025-08-11 RX ORDER — FLUTICASONE PROPIONATE 50 MCG
2 SPRAY, SUSPENSION (ML) NASAL DAILY
Qty: 48 ML | Refills: 1 | Status: SHIPPED | OUTPATIENT
Start: 2025-08-11

## (undated) DEVICE — CATHETER,FOLEY,100%SILICONE,16FR,10ML,LF: Brand: MEDLINE

## (undated) DEVICE — SYRINGE MED 10ML LUERLOCK TIP W/O SFTY DISP

## (undated) DEVICE — ELECTRODE ES L45CM DIA5MM HK MPLR L WIRE E Z CLN MEGADYNE

## (undated) DEVICE — DRAPE,LAP,CHOLE,W/TROUGHS,STERILE: Brand: MEDLINE

## (undated) DEVICE — COUNTER NDL 40 COUNT HLD 70 FOAM BLK ADH W/ MAG

## (undated) DEVICE — GLOVE SURG SZ 75 STD WHT LTX SYN POLYMER BEAD REINF ANTI RL

## (undated) DEVICE — DRAPE EQUIP TRNSPRT CONTAINMENT FOR BK TAB

## (undated) DEVICE — Device

## (undated) DEVICE — GOWN,AURORA,NONREINFORCED,LARGE: Brand: MEDLINE

## (undated) DEVICE — GAUZE,SPONGE,4"X4",16PLY,XRAY,STRL,LF: Brand: MEDLINE

## (undated) DEVICE — 2000CC GUARDIAN II: Brand: GUARDIAN

## (undated) DEVICE — TOWEL,OR,DSP,ST,BLUE,STD,4/PK,20PK/CS: Brand: MEDLINE

## (undated) DEVICE — INTENDED FOR TISSUE SEPARATION, AND OTHER PROCEDURES THAT REQUIRE A SHARP SURGICAL BLADE TO PUNCTURE OR CUT.: Brand: BARD-PARKER ® CARBON RIB-BACK BLADES

## (undated) DEVICE — TUBING, SUCTION, 1/4" X 10', STRAIGHT: Brand: MEDLINE

## (undated) DEVICE — LABEL MED MINI W/ MARKER

## (undated) DEVICE — GLOVE ORANGE PI 7 1/2   MSG9075

## (undated) DEVICE — TROCAR: Brand: KII® SLEEVE

## (undated) DEVICE — WRAP COHESIVE W2INXL5YD TAN SELF ADH BNDG HND NON STERILE TEAR CARING

## (undated) DEVICE — ELECTROSURGICAL PENCIL BUTTON SWITCH E-Z CLEAN COATED BLADE ELECTRODE 10 FT (3 M) CORD HOLSTER: Brand: MEGADYNE

## (undated) DEVICE — SUTURE MCRYL SZ 4-0 L27IN ABSRB UD L19MM PS-2 1/2 CIR PRIM Y426H

## (undated) DEVICE — BLOCK BITE PEDIATRIC 14 MM MOUTHPIECE POLYETH ENDO-GUARD LTX 100428] BARD INC]

## (undated) DEVICE — GAUZE,SPONGE,2"X2",8PLY,STERILE,LF,2'S: Brand: MEDLINE

## (undated) DEVICE — SUTURE ABSORBABLE BRAIDED 0 CT-1 8X27 IN UD VICRYL JJ41G

## (undated) DEVICE — GOWN,AURORA,NONRNF,XL,30/CS: Brand: MEDLINE

## (undated) DEVICE — HYPODERMIC SAFETY NEEDLE: Brand: MAGELLAN

## (undated) DEVICE — SPONGE GZ W4XL4IN COT 12 PLY TYP VII WVN C FLD DSGN

## (undated) DEVICE — BRUSH ENDO COMBO

## (undated) DEVICE — PACK,SET UP,DRAPE: Brand: MEDLINE

## (undated) DEVICE — KIT,ANTI FOG,W/SPONGE & FLUID,SOFT PACK: Brand: MEDLINE

## (undated) DEVICE — TROCAR: Brand: KII FIOS FIRST ENTRY

## (undated) DEVICE — BANDAGE,GAUZE,BULKEE II,4.5"X4.1YD,STRL: Brand: MEDLINE

## (undated) DEVICE — ELECTRODE PT RET AD L9FT HI MOIST COND ADH HYDRGEL CORDED

## (undated) DEVICE — SET,IRRIGATION,CYSTO/TUR: Brand: MEDLINE

## (undated) DEVICE — LITHOTOMY DRAPE WITH FLUID CONTROL POUCH: Brand: CONVERTORS

## (undated) DEVICE — TUBE SET 96 MM 64 MM H2O PERISTALTIC STD AUX CHANNEL

## (undated) DEVICE — YANKAUER,BULB TIP,W/O VENT,RIGID,STERILE: Brand: MEDLINE

## (undated) DEVICE — MEDI-VAC NON-CONDUCTIVE SUCTION TUBING: Brand: CARDINAL HEALTH

## (undated) DEVICE — ENDO CARRY-ON PROCEDURE KIT: Brand: ENDO CARRY-ON PROCEDURE KIT

## (undated) DEVICE — TUBING INSUF 03UM FLTR W LUERLOCK CPC CONN

## (undated) DEVICE — Z DISCONTINUED NO SUB IDED PACKING VAG W2XL180IN 28X24 MESH 4 PLY SURG GZ FAN FLD RADPQ

## (undated) DEVICE — CUTTER ENDOSCP L340MM LIN ARTC SGL STROKE FIRING ENDOPATH

## (undated) DEVICE — 4-PORT MANIFOLD: Brand: NEPTUNE 2

## (undated) DEVICE — COVER LT HNDL BLU PLAS

## (undated) DEVICE — ENDOSCOPIC TRAY TRNSPRT 20.5X16.5X4.1 IN RECYCL SUGAR PULP

## (undated) DEVICE — BAG SPEC REM 224ML W4XL6IN DIA10MM 1 HND GYN DISP ENDOPCH

## (undated) DEVICE — PACK,BASIC IV,SIRUS: Brand: MEDLINE

## (undated) DEVICE — MARKER SURG SKIN GENTIAN VLT REG TIP W/ 6IN RUL

## (undated) DEVICE — TRAY PREP DRY W/ PREM GLV 2 APPL 6 SPNG 2 UNDPD 1 OVERWRAP

## (undated) DEVICE — SUTURE VCRL SZ 3-0 L27IN ABSRB UD L26MM SH 1/2 CIR J416H

## (undated) DEVICE — WARMER SCP 2 ANTIFOG LAP DISP

## (undated) DEVICE — APPLICATOR MEDICATED 26 CC SOLUTION HI LT ORNG CHLORAPREP

## (undated) DEVICE — APPLIER CLP L SHFT DIA12MM 20 ROT MULT LIGACLP

## (undated) DEVICE — RELOAD STPL H1-2.5X45MM VASC THN TISS WHT 6 ROW B FRM SGL

## (undated) DEVICE — MEDI-VAC YANKAUER SUCTION HANDLE W/BULBOUS TIP: Brand: CARDINAL HEALTH

## (undated) DEVICE — RELOAD STPL SZ 0 L45MM DIA3.5MM 0DEG STD REG TISS BLU TI

## (undated) DEVICE — LINER PAD CONTOUR SUPER PEACH 7X14IN

## (undated) DEVICE — ADHESIVE SKIN CLSR 0.7ML TOP DERMBND ADV

## (undated) DEVICE — BANDAGE WND ADH LF FLX CURAD 3/4X3IN

## (undated) DEVICE — NEPTUNE E-SEP SMOKE EVACUATION PENCIL, COATED, 70MM BLADE, PUSH BUTTON SWITCH: Brand: NEPTUNE E-SEP

## (undated) DEVICE — INSUFFLATION TUBING SET, WITH FILTER: Brand: CONMED

## (undated) DEVICE — TOTAL TRAY, DB, 100% SILI FOLEY, 16FR 10: Brand: MEDLINE

## (undated) DEVICE — TROCAR ENDOSCP L100MM DIA5MM BLDELSS STBL SL OBT RADLUC

## (undated) DEVICE — GLOVE SURG 8.5 LTX TRIFLEX WIDE FINGER PWDR

## (undated) DEVICE — SUTURE SZ 0 27IN 5/8 CIR UR-6  TAPER PT VIOLET ABSRB VICRYL J603H

## (undated) DEVICE — TTL1LYR 16FR10ML 100%SIL TMPST TR: Brand: MEDLINE

## (undated) DEVICE — CATHETER SCLERO L240CM NDL 25GA L4MM SHTH DIA2.3MM CNTRST

## (undated) DEVICE — PENCIL SMOKE EVAC PUSH BUTTON COATED

## (undated) DEVICE — CHLORAPREP 26ML ORANGE

## (undated) DEVICE — SINGLE PORT MANIFOLD: Brand: NEPTUNE 2

## (undated) DEVICE — CONNECTOR TUBE BLU CHAN

## (undated) DEVICE — GLOVE ORANGE PI 7   MSG9070

## (undated) DEVICE — PATIENT RETURN ELECTRODE, SINGLE-USE, CONTACT QUALITY MONITORING, ADULT, WITH 9FT CORD, FOR PATIENTS WEIGING OVER 33LBS. (15KG): Brand: MEGADYNE

## (undated) DEVICE — HAND II: Brand: MEDLINE INDUSTRIES, INC.

## (undated) DEVICE — BANDAGE ADH W2XL4IN NITRL FAB STRP CURAD

## (undated) DEVICE — Device: Brand: ENDO SMARTCAP

## (undated) DEVICE — SUTURE VCRL SZ 4-0 L18IN ABSRB UD L19MM PS-2 3/8 CIR PRIM J496H

## (undated) DEVICE — PAD,SANITARY,11 IN,MAXI,W/WINGS,N-STRL: Brand: MEDLINE

## (undated) DEVICE — LAPAROSCOPIC TROCAR SLEEVE/SINGLE USE: Brand: KII® OPTICAL ACCESS SYSTEM